# Patient Record
Sex: FEMALE | Race: OTHER | HISPANIC OR LATINO | Employment: PART TIME | URBAN - METROPOLITAN AREA
[De-identification: names, ages, dates, MRNs, and addresses within clinical notes are randomized per-mention and may not be internally consistent; named-entity substitution may affect disease eponyms.]

---

## 2017-06-24 ENCOUNTER — HOSPITAL ENCOUNTER (EMERGENCY)
Facility: HOSPITAL | Age: 22
Discharge: HOME | End: 2017-06-24
Attending: EMERGENCY MEDICINE
Payer: COMMERCIAL

## 2017-06-24 VITALS
HEART RATE: 93 BPM | TEMPERATURE: 98.2 F | RESPIRATION RATE: 18 BRPM | SYSTOLIC BLOOD PRESSURE: 126 MMHG | DIASTOLIC BLOOD PRESSURE: 71 MMHG | OXYGEN SATURATION: 98 % | WEIGHT: 160.05 LBS

## 2017-06-24 DIAGNOSIS — H60.91 RIGHT OTITIS EXTERNA: ICD-10-CM

## 2017-06-24 DIAGNOSIS — H66.91 RIGHT OTITIS MEDIA: Primary | ICD-10-CM

## 2017-06-24 PROCEDURE — 99283 EMERGENCY DEPT VISIT LOW MDM: CPT

## 2017-06-24 RX ORDER — IBUPROFEN 600 MG/1
600 TABLET ORAL ONCE
Status: COMPLETED | OUTPATIENT
Start: 2017-06-24 | End: 2017-06-24

## 2017-06-24 RX ORDER — AMOXICILLIN AND CLAVULANATE POTASSIUM 875; 125 MG/1; MG/1
1 TABLET, FILM COATED ORAL ONCE
Status: COMPLETED | OUTPATIENT
Start: 2017-06-24 | End: 2017-06-24

## 2017-06-24 RX ORDER — IBUPROFEN 600 MG/1
600 TABLET ORAL EVERY 6 HOURS PRN
Qty: 30 TABLET | Refills: 0 | Status: SHIPPED | OUTPATIENT
Start: 2017-06-24 | End: 2017-12-03

## 2017-06-24 RX ORDER — AMOXICILLIN AND CLAVULANATE POTASSIUM 875; 125 MG/1; MG/1
1 TABLET, FILM COATED ORAL 2 TIMES DAILY
Qty: 20 TABLET | Refills: 0 | Status: SHIPPED | OUTPATIENT
Start: 2017-06-24 | End: 2017-07-04

## 2017-06-24 RX ORDER — PROPARACAINE HYDROCHLORIDE 5 MG/ML
1 SOLUTION/ DROPS OPHTHALMIC ONCE
Status: COMPLETED | OUTPATIENT
Start: 2017-06-24 | End: 2017-06-24

## 2017-06-24 RX ORDER — NEOMYCIN SULFATE, POLYMYXIN B SULFATE AND HYDROCORTISONE 10; 3.5; 1 MG/ML; MG/ML; [USP'U]/ML
4 SUSPENSION/ DROPS AURICULAR (OTIC) ONCE
Qty: 10 ML | Refills: 0 | Status: SHIPPED | OUTPATIENT
Start: 2017-06-24 | End: 2017-12-03

## 2017-06-24 RX ORDER — NEOMYCIN SULFATE, POLYMYXIN B SULFATE AND HYDROCORTISONE 10; 3.5; 1 MG/ML; MG/ML; [USP'U]/ML
4 SUSPENSION/ DROPS AURICULAR (OTIC) ONCE
Status: COMPLETED | OUTPATIENT
Start: 2017-06-24 | End: 2017-06-24

## 2017-06-24 RX ADMIN — AMOXICILLIN AND CLAVULANATE POTASSIUM 1 TABLET: 875; 125 TABLET, FILM COATED ORAL at 02:05

## 2017-06-24 RX ADMIN — IBUPROFEN 600 MG: 600 TABLET, FILM COATED ORAL at 02:05

## 2017-06-24 RX ADMIN — PROPARACAINE HYDROCHLORIDE 1 DROP: 5 SOLUTION/ DROPS OPHTHALMIC at 02:06

## 2017-06-24 RX ADMIN — NEOMYCIN SULFATE, POLYMYXIN B SULFATE AND HYDROCORTISONE 4 DROP: 10; 3.5; 1 SUSPENSION/ DROPS AURICULAR (OTIC) at 02:06

## 2017-06-25 ENCOUNTER — HOSPITAL ENCOUNTER (EMERGENCY)
Facility: HOSPITAL | Age: 22
Discharge: HOME | End: 2017-06-25
Admitting: EMERGENCY MEDICINE
Payer: COMMERCIAL

## 2017-06-25 ENCOUNTER — APPOINTMENT (EMERGENCY)
Dept: CT IMAGING | Facility: HOSPITAL | Age: 22
End: 2017-06-25
Payer: COMMERCIAL

## 2017-06-25 VITALS
DIASTOLIC BLOOD PRESSURE: 67 MMHG | TEMPERATURE: 99.6 F | WEIGHT: 150 LBS | SYSTOLIC BLOOD PRESSURE: 121 MMHG | OXYGEN SATURATION: 100 % | HEART RATE: 94 BPM | RESPIRATION RATE: 18 BRPM

## 2017-06-25 DIAGNOSIS — H66.90 OTITIS MEDIA: Primary | ICD-10-CM

## 2017-06-25 DIAGNOSIS — H60.90 OTITIS EXTERNA: ICD-10-CM

## 2017-06-25 LAB
ANION GAP SERPL CALCULATED.3IONS-SCNC: 7 MMOL/L (ref 4–13)
BASOPHILS # BLD AUTO: 0.01 THOUSANDS/ΜL (ref 0–0.1)
BASOPHILS NFR BLD AUTO: 0 % (ref 0–1)
BUN SERPL-MCNC: 5 MG/DL (ref 5–25)
CALCIUM SERPL-MCNC: 9.2 MG/DL (ref 8.3–10.1)
CHLORIDE SERPL-SCNC: 100 MMOL/L (ref 100–108)
CO2 SERPL-SCNC: 28 MMOL/L (ref 21–32)
CREAT SERPL-MCNC: 0.62 MG/DL (ref 0.6–1.3)
EOSINOPHIL # BLD AUTO: 0.07 THOUSAND/ΜL (ref 0–0.61)
EOSINOPHIL NFR BLD AUTO: 1 % (ref 0–6)
ERYTHROCYTE [DISTWIDTH] IN BLOOD BY AUTOMATED COUNT: 14.5 % (ref 11.6–15.1)
GFR SERPL CREATININE-BSD FRML MDRD: >60 ML/MIN/1.73SQ M
GLUCOSE SERPL-MCNC: 114 MG/DL (ref 65–140)
HCG UR QL: NORMAL
HCT VFR BLD AUTO: 38.3 % (ref 34.8–46.1)
HGB BLD-MCNC: 13 G/DL (ref 11.5–15.4)
LYMPHOCYTES # BLD AUTO: 1.35 THOUSANDS/ΜL (ref 0.6–4.47)
LYMPHOCYTES NFR BLD AUTO: 14 % (ref 14–44)
MCH RBC QN AUTO: 27.4 PG (ref 26.8–34.3)
MCHC RBC AUTO-ENTMCNC: 33.9 G/DL (ref 31.4–37.4)
MCV RBC AUTO: 81 FL (ref 82–98)
MONOCYTES # BLD AUTO: 0.8 THOUSAND/ΜL (ref 0.17–1.22)
MONOCYTES NFR BLD AUTO: 9 % (ref 4–12)
NEUTROPHILS # BLD AUTO: 7.17 THOUSANDS/ΜL (ref 1.85–7.62)
NEUTS SEG NFR BLD AUTO: 76 % (ref 43–75)
NRBC BLD AUTO-RTO: 0 /100 WBCS
PLATELET # BLD AUTO: 205 THOUSANDS/UL (ref 149–390)
PMV BLD AUTO: 9.5 FL (ref 8.9–12.7)
POTASSIUM SERPL-SCNC: 4.2 MMOL/L (ref 3.5–5.3)
RBC # BLD AUTO: 4.74 MILLION/UL (ref 3.81–5.12)
SODIUM SERPL-SCNC: 135 MMOL/L (ref 136–145)
WBC # BLD AUTO: 9.4 THOUSAND/UL (ref 4.31–10.16)

## 2017-06-25 PROCEDURE — 36415 COLL VENOUS BLD VENIPUNCTURE: CPT | Performed by: PHYSICIAN ASSISTANT

## 2017-06-25 PROCEDURE — 99284 EMERGENCY DEPT VISIT MOD MDM: CPT

## 2017-06-25 PROCEDURE — 80048 BASIC METABOLIC PNL TOTAL CA: CPT | Performed by: PHYSICIAN ASSISTANT

## 2017-06-25 PROCEDURE — 85025 COMPLETE CBC W/AUTO DIFF WBC: CPT | Performed by: PHYSICIAN ASSISTANT

## 2017-06-25 PROCEDURE — 96374 THER/PROPH/DIAG INJ IV PUSH: CPT

## 2017-06-25 PROCEDURE — 70480 CT ORBIT/EAR/FOSSA W/O DYE: CPT

## 2017-06-25 PROCEDURE — 81025 URINE PREGNANCY TEST: CPT | Performed by: PHYSICIAN ASSISTANT

## 2017-06-25 RX ORDER — TRAMADOL HYDROCHLORIDE 50 MG/1
50 TABLET ORAL ONCE
Status: COMPLETED | OUTPATIENT
Start: 2017-06-25 | End: 2017-06-25

## 2017-06-25 RX ORDER — TRAMADOL HYDROCHLORIDE 50 MG/1
50 TABLET ORAL EVERY 6 HOURS PRN
Qty: 12 TABLET | Refills: 0 | Status: SHIPPED | OUTPATIENT
Start: 2017-06-25 | End: 2017-06-25

## 2017-06-25 RX ORDER — METHYLPREDNISOLONE SODIUM SUCCINATE 125 MG/2ML
80 INJECTION, POWDER, LYOPHILIZED, FOR SOLUTION INTRAMUSCULAR; INTRAVENOUS ONCE
Status: COMPLETED | OUTPATIENT
Start: 2017-06-25 | End: 2017-06-25

## 2017-06-25 RX ORDER — MORPHINE SULFATE 15 MG/1
15 TABLET ORAL EVERY 4 HOURS PRN
Qty: 30 TABLET | Refills: 0 | Status: SHIPPED | OUTPATIENT
Start: 2017-06-25 | End: 2017-07-05

## 2017-06-25 RX ADMIN — TRAMADOL HYDROCHLORIDE 50 MG: 50 TABLET, COATED ORAL at 20:45

## 2017-06-25 RX ADMIN — METHYLPREDNISOLONE SODIUM SUCCINATE 80 MG: 125 INJECTION, POWDER, FOR SOLUTION INTRAMUSCULAR; INTRAVENOUS at 21:49

## 2017-10-13 LAB
EXTERNAL ABO GROUPING: NORMAL
EXTERNAL ANTIBODY SCREEN: NORMAL
EXTERNAL HEPATITIS B SURFACE ANTIGEN: NONREACTIVE
EXTERNAL HIV-1 ANTIBODY: NONREACTIVE
EXTERNAL RH FACTOR: POSITIVE
EXTERNAL RUBELLA IGG QUANTITATION: NORMAL
EXTERNAL SYPHILIS RPR SCREEN: NORMAL

## 2017-10-30 ENCOUNTER — APPOINTMENT (OUTPATIENT)
Dept: LAB | Facility: MEDICAL CENTER | Age: 22
End: 2017-10-30

## 2017-10-30 ENCOUNTER — APPOINTMENT (OUTPATIENT)
Dept: URGENT CARE | Facility: MEDICAL CENTER | Age: 22
End: 2017-10-30

## 2017-10-30 ENCOUNTER — TRANSCRIBE ORDERS (OUTPATIENT)
Dept: URGENT CARE | Facility: MEDICAL CENTER | Age: 22
End: 2017-10-30

## 2017-10-30 DIAGNOSIS — Z02.1 PHYSICAL EXAM, PRE-EMPLOYMENT: Primary | ICD-10-CM

## 2017-10-30 DIAGNOSIS — Z02.1 PHYSICAL EXAM, PRE-EMPLOYMENT: ICD-10-CM

## 2017-10-30 PROCEDURE — 86787 VARICELLA-ZOSTER ANTIBODY: CPT

## 2017-10-30 PROCEDURE — 36415 COLL VENOUS BLD VENIPUNCTURE: CPT

## 2017-10-30 PROCEDURE — 86480 TB TEST CELL IMMUN MEASURE: CPT

## 2017-10-31 LAB — VZV IGG SER IA-ACNC: NORMAL

## 2017-11-01 LAB
ANNOTATION COMMENT IMP: NORMAL
GAMMA INTERFERON BACKGROUND BLD IA-ACNC: 0.03 IU/ML
M TB IFN-G BLD-IMP: NEGATIVE
M TB IFN-G CD4+ BCKGRND COR BLD-ACNC: 0.01 IU/ML
M TB IFN-G CD4+ T-CELLS BLD-ACNC: 0.04 IU/ML
MITOGEN IGNF BLD-ACNC: >10 IU/ML
QUANTIFERON-TB GOLD IN TUBE: NORMAL
SERVICE CMNT-IMP: NORMAL

## 2017-11-10 LAB — CFTR MUT ANL BLD/T: NEGATIVE

## 2017-12-03 ENCOUNTER — HOSPITAL ENCOUNTER (EMERGENCY)
Facility: HOSPITAL | Age: 22
Discharge: HOME | End: 2017-12-03
Attending: EMERGENCY MEDICINE | Admitting: EMERGENCY MEDICINE
Payer: COMMERCIAL

## 2017-12-03 VITALS
TEMPERATURE: 98.1 F | RESPIRATION RATE: 16 BRPM | OXYGEN SATURATION: 100 % | SYSTOLIC BLOOD PRESSURE: 114 MMHG | DIASTOLIC BLOOD PRESSURE: 69 MMHG | HEART RATE: 98 BPM

## 2017-12-03 DIAGNOSIS — L03.116 CELLULITIS OF LEFT FOOT: ICD-10-CM

## 2017-12-03 DIAGNOSIS — L03.90 CELLULITIS: Primary | ICD-10-CM

## 2017-12-03 PROCEDURE — 99283 EMERGENCY DEPT VISIT LOW MDM: CPT

## 2017-12-03 RX ORDER — CEPHALEXIN 250 MG/1
500 CAPSULE ORAL ONCE
Status: COMPLETED | OUTPATIENT
Start: 2017-12-03 | End: 2017-12-03

## 2017-12-03 RX ORDER — PANTOPRAZOLE SODIUM 20 MG/1
TABLET, DELAYED RELEASE ORAL DAILY
COMMUNITY
End: 2018-01-01

## 2017-12-03 RX ORDER — CEPHALEXIN 500 MG/1
500 CAPSULE ORAL EVERY 12 HOURS SCHEDULED
Qty: 14 CAPSULE | Refills: 0 | Status: SHIPPED | OUTPATIENT
Start: 2017-12-03 | End: 2017-12-10

## 2017-12-03 RX ADMIN — CEPHALEXIN 500 MG: 250 CAPSULE ORAL at 21:48

## 2017-12-04 NOTE — DISCHARGE INSTRUCTIONS
Cellulitis, Ambulatory Care   GENERAL INFORMATION:   Cellulitis  is a skin infection caused by bacteria  Common symptoms include the following:   · Fever    · A red, warm, swollen area on your skin    · Pain when the area is touched    · Bumps or blisters (abscess) that may drain pus    · Bumpy, raised skin that feels like an orange peel  Seek immediate care for the following symptoms:   · An increase in pain, redness, warmth, and size    · Red streaks coming from the infected area    · A thin, gray-brown discharge coming from your infected skin area    · A crackling under your skin when you touch it    · Purple dots or bumps on your skin, or bleeding under your skin    · New swelling and pain in your legs    · Sudden trouble breathing or chest pain  Treatment for cellulitis  may include medicines to treat the bacterial infection or decrease pain  The infection may need to be cleaned out  Damaged, dead, or infected tissue may need to be cut away to help your wound heal   Manage your symptoms:   · Elevate your wound above the level of your heart  as often as you can  This will help decrease swelling and pain  Prop your wound on pillows or blankets to keep it elevated comfortably  · Clean your wound as directed  You may need to wash the wound with soap and water  Look for signs of infection  · Wear pressure stockings as directed  The stockings are tight and put pressure on your legs  This improves blood flow and decreases swelling  Prevent cellulitis:   · Wash your hands often  Use soap and water  Wash your hands after you use the bathroom, change a child's diapers, or sneeze  Wash your hands before you prepare or eat food  Use lotion to prevent dry, cracked skin  · Do not share personal items, such as towels, clothing, and razors  · Clean exercise equipment  with germ-killing  before and after you use it    Follow up with your healthcare provider as directed:  Write down your questions so you remember to ask them during your visits  CARE AGREEMENT:   You have the right to help plan your care  Learn about your health condition and how it may be treated  Discuss treatment options with your caregivers to decide what care you want to receive  You always have the right to refuse treatment  The above information is an  only  It is not intended as medical advice for individual conditions or treatments  Talk to your doctor, nurse or pharmacist before following any medical regimen to see if it is safe and effective for you  © 2014 [address] is for End User's use only and may not be sold, redistributed or otherwise used for commercial purposes  All illustrations and images included in CareNotes® are the copyrighted property of A D A M , Inc  or [facility]  Cellulitis   WHAT YOU NEED TO KNOW:   Cellulitis is a skin infection caused by bacteria  Cellulitis may go away on its own or you may need treatment  Your healthcare provider may draw a [place] around the outside edges of your cellulitis  If your cellulitis spreads, your healthcare provider will see it outside of the [place]  DISCHARGE INSTRUCTIONS:   Call 911 if:   · You have sudden trouble breathing or chest pain  Seek care immediately if:   · Your wound gets larger and more painful  · You feel a crackling under your skin when you touch it  · You have purple dots or bumps on your skin, or you see bleeding under your skin  · You have new swelling and pain in your legs  · The red, warm, swollen area gets larger  · You see red streaks coming from the infected area  Contact your healthcare provider if:   · You have a fever  · Your fever or pain does not go away or gets worse  · The area does not get smaller after 2 days of antibiotics  · Your skin is flaking or peeling off  · You have questions or concerns about your condition or care    Medicines: · Antibiotics  help treat the bacterial infection  · NSAIDs , such as ibuprofen, help decrease swelling, pain, and fever  NSAIDs can cause stomach bleeding or kidney problems in certain people  If you take blood thinner medicine, always ask if NSAIDs are safe for you  Always read the medicine label and follow directions  Do not give these medicines to children under 10months of age without direction from your child's healthcare provider  · Acetaminophen  decreases pain and fever  It is available without a doctor's order  Ask how much to take and how often to take it  Follow directions  Read the labels of all other medicines you are using to see if they also contain acetaminophen, or ask your doctor or pharmacist  Acetaminophen can cause liver damage if not taken correctly  Do not use more than 4 grams (4,000 milligrams) total of acetaminophen in one day  · Take your medicine as directed  Contact your healthcare provider if you think your medicine is not helping or if you have side effects  Tell him or her if you are allergic to any medicine  Keep a list of the medicines, vitamins, and herbs you take  Include the amounts, and when and why you take them  Bring the list or the pill bottles to follow-up visits  Carry your medicine list with you in case of an emergency  Self-care:   · Elevate the area above the level of your heart  as often as you can  This will help decrease swelling and pain  Prop the area on pillows or blankets to keep it elevated comfortably  · Clean the area daily until the wound scabs over  Gently wash the area with soap and water  Pat dry  Use dressings as directed  · Place cool or warm, wet cloths on the area as directed  Use clean cloths and clean water  Leave it on the area until the cloth is room temperature  Pat the area dry with a clean, dry cloth  The cloths may help decrease pain  Prevent cellulitis:   · Do not scratch bug bites or areas of injury    You increase your risk for cellulitis by scratching these areas  · Do not share personal items, such as towels, clothing, and razors  · Clean exercise equipment  with germ-killing  before and after you use it  · Wash your hands often  Use soap and water  Wash your hands after you use the bathroom, change a child's diapers, or sneeze  Wash your hands before you prepare or eat food  Use lotion to prevent dry, cracked skin  · Wear pressure stockings as directed  You may be told to wear the stockings if you have peripheral edema  The stockings improve blood flow and decrease swelling  · Treat athlete's foot  This can help prevent the spread of a bacterial skin infection  Follow up with your healthcare provider within 3 days, or as directed: Your healthcare provider will check if your cellulitis is getting better  You may need different medicine  Write down your questions so you remember to ask them during your visits  © 2017 [address]  Information is for End User's use only and may not be sold, redistributed or otherwise used for commercial purposes  All illustrations and images included in CareNotes® are the copyrighted property of A D A M , Inc  or [employer] Mann  The above information is an  only  It is not intended as medical advice for individual conditions or treatments  Talk to your doctor, nurse or pharmacist before following any medical regimen to see if it is safe and effective for you

## 2017-12-05 ENCOUNTER — APPOINTMENT (EMERGENCY)
Dept: RADIOLOGY | Facility: HOSPITAL | Age: 22
End: 2017-12-05
Payer: COMMERCIAL

## 2017-12-05 ENCOUNTER — HOSPITAL ENCOUNTER (EMERGENCY)
Facility: HOSPITAL | Age: 22
Discharge: HOME | End: 2017-12-05
Attending: EMERGENCY MEDICINE | Admitting: EMERGENCY MEDICINE
Payer: COMMERCIAL

## 2017-12-05 VITALS
TEMPERATURE: 98.8 F | BODY MASS INDEX: 29.23 KG/M2 | SYSTOLIC BLOOD PRESSURE: 108 MMHG | HEIGHT: 59 IN | HEART RATE: 78 BPM | RESPIRATION RATE: 18 BRPM | DIASTOLIC BLOOD PRESSURE: 57 MMHG | OXYGEN SATURATION: 100 % | WEIGHT: 145 LBS

## 2017-12-05 DIAGNOSIS — L03.116 CELLULITIS OF LEFT FOOT: Primary | ICD-10-CM

## 2017-12-05 LAB
ANION GAP SERPL CALCULATED.3IONS-SCNC: 8 MMOL/L (ref 4–13)
BASOPHILS # BLD AUTO: 0.01 THOUSANDS/ΜL (ref 0–0.1)
BASOPHILS NFR BLD AUTO: 0 % (ref 0–1)
BUN SERPL-MCNC: 11 MG/DL (ref 5–25)
CALCIUM SERPL-MCNC: 9 MG/DL (ref 8.3–10.1)
CHLORIDE SERPL-SCNC: 104 MMOL/L (ref 100–108)
CO2 SERPL-SCNC: 25 MMOL/L (ref 21–32)
CREAT SERPL-MCNC: 0.43 MG/DL (ref 0.6–1.3)
CRP SERPL QL: 34.7 MG/L
EOSINOPHIL # BLD AUTO: 0.13 THOUSAND/ΜL (ref 0–0.61)
EOSINOPHIL NFR BLD AUTO: 1 % (ref 0–6)
ERYTHROCYTE [DISTWIDTH] IN BLOOD BY AUTOMATED COUNT: 14.1 % (ref 11.6–15.1)
GFR SERPL CREATININE-BSD FRML MDRD: 145 ML/MIN/1.73SQ M
GLUCOSE SERPL-MCNC: 72 MG/DL (ref 65–140)
HCT VFR BLD AUTO: 37.3 % (ref 34.8–46.1)
HGB BLD-MCNC: 12.7 G/DL (ref 11.5–15.4)
LYMPHOCYTES # BLD AUTO: 2.62 THOUSANDS/ΜL (ref 0.6–4.47)
LYMPHOCYTES NFR BLD AUTO: 28 % (ref 14–44)
MCH RBC QN AUTO: 28.2 PG (ref 26.8–34.3)
MCHC RBC AUTO-ENTMCNC: 34 G/DL (ref 31.4–37.4)
MCV RBC AUTO: 83 FL (ref 82–98)
MONOCYTES # BLD AUTO: 0.65 THOUSAND/ΜL (ref 0.17–1.22)
MONOCYTES NFR BLD AUTO: 7 % (ref 4–12)
NEUTROPHILS # BLD AUTO: 6.09 THOUSANDS/ΜL (ref 1.85–7.62)
NEUTS SEG NFR BLD AUTO: 64 % (ref 43–75)
NRBC BLD AUTO-RTO: 0 /100 WBCS
PLATELET # BLD AUTO: 206 THOUSANDS/UL (ref 149–390)
PMV BLD AUTO: 9.4 FL (ref 8.9–12.7)
POTASSIUM SERPL-SCNC: 3.9 MMOL/L (ref 3.5–5.3)
RBC # BLD AUTO: 4.51 MILLION/UL (ref 3.81–5.12)
SODIUM SERPL-SCNC: 137 MMOL/L (ref 136–145)
WBC # BLD AUTO: 9.53 THOUSAND/UL (ref 4.31–10.16)

## 2017-12-05 PROCEDURE — 85025 COMPLETE CBC W/AUTO DIFF WBC: CPT | Performed by: EMERGENCY MEDICINE

## 2017-12-05 PROCEDURE — 80048 BASIC METABOLIC PNL TOTAL CA: CPT | Performed by: EMERGENCY MEDICINE

## 2017-12-05 PROCEDURE — 96365 THER/PROPH/DIAG IV INF INIT: CPT

## 2017-12-05 PROCEDURE — 36415 COLL VENOUS BLD VENIPUNCTURE: CPT | Performed by: EMERGENCY MEDICINE

## 2017-12-05 PROCEDURE — 99283 EMERGENCY DEPT VISIT LOW MDM: CPT

## 2017-12-05 PROCEDURE — 73630 X-RAY EXAM OF FOOT: CPT

## 2017-12-05 PROCEDURE — 86140 C-REACTIVE PROTEIN: CPT | Performed by: EMERGENCY MEDICINE

## 2017-12-05 RX ORDER — CLINDAMYCIN HYDROCHLORIDE 150 MG/1
450 CAPSULE ORAL EVERY 8 HOURS SCHEDULED
Qty: 40 CAPSULE | Refills: 0 | Status: SHIPPED | OUTPATIENT
Start: 2017-12-05 | End: 2017-12-15

## 2017-12-05 RX ORDER — CLINDAMYCIN HYDROCHLORIDE 150 MG/1
450 CAPSULE ORAL EVERY 8 HOURS SCHEDULED
Qty: 40 CAPSULE | Refills: 0 | Status: SHIPPED | OUTPATIENT
Start: 2017-12-05 | End: 2017-12-05 | Stop reason: CLARIF

## 2017-12-05 RX ORDER — CLINDAMYCIN PHOSPHATE 600 MG/50ML
600 INJECTION INTRAVENOUS ONCE
Status: COMPLETED | OUTPATIENT
Start: 2017-12-05 | End: 2017-12-05

## 2017-12-05 RX ADMIN — CLINDAMYCIN PHOSPHATE 600 MG: 12 INJECTION, SOLUTION INTRAMUSCULAR; INTRAVENOUS at 16:54

## 2017-12-05 NOTE — ED ATTENDING ATTESTATION
Antoine Fitch MD, saw and evaluated the patient  I have discussed the patient with the resident/non-physician practitioner and agree with the resident's/non-physician practitioner's findings, Plan of Care, and MDM as documented in the resident's/non-physician practitioner's note, except where noted  All available labs and Radiology studies were reviewed  At this point I agree with the current assessment done in the Emergency Department  I have conducted an independent evaluation of this patient a history and physical is as follows:   Pt is 3 months pregnant and started with pain and swelling of 5th toe on L with a pustule on Thursday Pt was seen on the 3rd and was given keflex BID She has taken 6 doses and feels it is getting worse Pt did pop the pustule in shower and got some pus  Pt had fevers at start but they have been lessening and improving PE: alert heart reg lungs clear abd soft nontender L 5th toe with pustule nad redness no crepitus this has gone to ankle  No lymphangitic streaking MDM: will xray check labs give clindamycin which is category B in preg   Will recheck in 24 hours if labs ok     Critical Care Time  CritCare Time

## 2017-12-05 NOTE — DISCHARGE INSTRUCTIONS
You were seen in the ER for worsening cellulitis of your left foot  Your lab work was normal and the x-ray of your foot did not show signs of gas in the tissue, which would be suggestive of a more severe type of infection  Please take the new antibiotic and stop taking the Keflex  Continue to take tylenol as-needed for pain and put your foot up while at work tomorrow  Please be seen by a doctor in the ER tomorrow afternoon so he or she can evaluate whether your infection is continuing to spread  Please return sooner if you notice that the pain or redness is spreading rapidly  Cellulitis   WHAT YOU NEED TO KNOW:   Cellulitis is a skin infection caused by bacteria  Cellulitis may go away on its own or you may need treatment  Your healthcare provider may draw a [place] around the outside edges of your cellulitis  If your cellulitis spreads, your healthcare provider will see it outside of the [place]  DISCHARGE INSTRUCTIONS:   Call 911 if:   · You have sudden trouble breathing or chest pain  Return to the emergency department if:   · Your wound gets larger and more painful  · You feel a crackling under your skin when you touch it  · You have purple dots or bumps on your skin, or you see bleeding under your skin  · You have new swelling and pain in your legs  · The red, warm, swollen area gets larger  · You see red streaks coming from the infected area  Contact your healthcare provider if:   · You have a fever  · Your fever or pain does not go away or gets worse  · The area does not get smaller after 2 days of antibiotics  · Your skin is flaking or peeling off  · You have questions or concerns about your condition or care  Medicines:   · Antibiotics  help treat the bacterial infection  · NSAIDs , such as ibuprofen, help decrease swelling, pain, and fever  NSAIDs can cause stomach bleeding or kidney problems in certain people   If you take blood thinner medicine, always ask if NSAIDs are safe for you  Always read the medicine label and follow directions  Do not give these medicines to children under 10months of age without direction from your child's healthcare provider  · Acetaminophen  decreases pain and fever  It is available without a doctor's order  Ask how much to take and how often to take it  Follow directions  Read the labels of all other medicines you are using to see if they also contain acetaminophen, or ask your doctor or pharmacist  Acetaminophen can cause liver damage if not taken correctly  Do not use more than 4 grams (4,000 milligrams) total of acetaminophen in one day  · Take your medicine as directed  Contact your healthcare provider if you think your medicine is not helping or if you have side effects  Tell him or her if you are allergic to any medicine  Keep a list of the medicines, vitamins, and herbs you take  Include the amounts, and when and why you take them  Bring the list or the pill bottles to follow-up visits  Carry your medicine list with you in case of an emergency  Self-care:   · Elevate the area above the level of your heart  as often as you can  This will help decrease swelling and pain  Prop the area on pillows or blankets to keep it elevated comfortably  · Clean the area daily until the wound scabs over  Gently wash the area with soap and water  Pat dry  Use dressings as directed  · Place cool or warm, wet cloths on the area as directed  Use clean cloths and clean water  Leave it on the area until the cloth is room temperature  Pat the area dry with a clean, dry cloth  The cloths may help decrease pain  Prevent cellulitis:   · Do not scratch bug bites or areas of injury  You increase your risk for cellulitis by scratching these areas  · Do not share personal items, such as towels, clothing, and razors  · Clean exercise equipment  with germ-killing  before and after you use it  · Wash your hands often    Use soap and water  Wash your hands after you use the bathroom, change a child's diapers, or sneeze  Wash your hands before you prepare or eat food  Use lotion to prevent dry, cracked skin  · Wear pressure stockings as directed  You may be told to wear the stockings if you have peripheral edema  The stockings improve blood flow and decrease swelling  · Treat athlete's foot  This can help prevent the spread of a bacterial skin infection  Follow up with your healthcare provider within 3 days, or as directed: Your healthcare provider will check if your cellulitis is getting better  You may need different medicine  Write down your questions so you remember to ask them during your visits  © 2017 [address] [facility] Information is for End User's use only and may not be sold, redistributed or otherwise used for commercial purposes  All illustrations and images included in CareNotes® are the copyrighted property of [employer]  or [employer] Mann  The above information is an  only  It is not intended as medical advice for individual conditions or treatments  Talk to your doctor, nurse or pharmacist before following any medical regimen to see if it is safe and effective for you

## 2017-12-05 NOTE — ED PROVIDER NOTES
History  Chief Complaint   Patient presents with    Spider Bite     pt states she got bit on thursday by a Spider  Went to [address] [place] [address] on  where they started her on PO antibiotics and marked the wound on her foot, but the redness is spreading  Pt c/o increasing pain in left leg  Patient is a 25year-old female, 3 months pregnant, with a history of von Willebrand disease who presents with a soft tissue infection of the left foot  The pain and redness started at the base of her left pinky toe on Thursday; she attributes this to having been bitten by a spider while cleaning  The pain and redness continued to spread and she developed subjective fever, chills, headache, nausea and vomiting on Saturday and   She was seen at [address] [place] [address] on 12/3 for the same complaint and was treated with PO keflex  She has been compliant with this but her pain, redness, and swelling have continued to spread  She now has difficulty walking 2/2 pain  She had nausea with emesis x1 today but has not had subjective fever since yesterday  Prior to Admission Medications   Prescriptions Last Dose Informant Patient Reported? Taking? Prenatal Multivit-Min-Fe-FA (PRE- PO) 2017 at Unknown time  Yes Yes   Sig: Take by mouth   cephalexin (KEFLEX) 500 mg capsule 2017 at Unknown time  No Yes   Sig: Take 1 capsule by mouth every 12 (twelve) hours for 7 days   pantoprazole (PROTONIX) 20 mg tablet 2017 at Unknown time  Yes Yes   Sig: Take by mouth daily      Facility-Administered Medications: None       Past Medical History:   Diagnosis Date    Von Willebrand disease ([facility] [address] )        Past Surgical History:   Procedure Laterality Date    CHOLECYSTECTOMY      TONSILLECTOMY         History reviewed  No pertinent family history  I have reviewed and agree with the history as documented      Social History   Substance Use Topics    Smoking status: Never Smoker    Smokeless tobacco: Never Used    Alcohol use No Review of Systems   Constitutional: Positive for chills and fever  Negative for fatigue  HENT: Negative for congestion and sore throat  Eyes: Negative for visual disturbance  Respiratory: Negative for cough and shortness of breath  Cardiovascular: Negative for chest pain  Gastrointestinal: Negative for abdominal pain, diarrhea, nausea and vomiting  Endocrine: Negative for polyuria  Genitourinary: Negative for difficulty urinating and dysuria  Musculoskeletal: Negative for arthralgias  Skin: Negative for rash  Neurological: Negative for dizziness, light-headedness and headaches  All other systems reviewed and are negative  Physical Exam  ED Triage Vitals [12/05/17 1525]   Temperature Pulse Respirations Blood Pressure SpO2   98 8 °F (37 1 °C) 86 16 126/63 99 %      Temp Source Heart Rate Source Patient Position - Orthostatic VS BP Location FiO2 (%)   Tympanic Monitor Sitting Left arm --      Pain Score       8           Orthostatic Vital Signs  Vitals:    12/05/17 1525 12/05/17 1634 12/05/17 1800   BP: 126/63 118/66 108/57   Pulse: 86 82 78   Patient Position - Orthostatic VS: Sitting  Sitting       Physical Exam   Constitutional: She is oriented to person, place, and time  She appears well-developed and well-nourished  No distress  HENT:   Head: Normocephalic and atraumatic  Cardiovascular: Normal rate, regular rhythm and normal heart sounds  Pulmonary/Chest: Effort normal and breath sounds normal  No respiratory distress  Abdominal: Soft  Bowel sounds are normal  There is no tenderness  Musculoskeletal: Normal range of motion  Feet:    Neurological: She is alert and oriented to person, place, and time  Skin: Skin is warm and dry  Psychiatric: She has a normal mood and affect  Nursing note and vitals reviewed        ED Medications  Medications   clindamycin (CLEOCIN) IVPB (premix) 600 mg (0 mg Intravenous Stopped 12/5/17 1730)       Diagnostic Studies  Results Reviewed     Procedure Component Value Units Date/Time    Basic metabolic panel [[id number]]  (Abnormal) Collected:  12/05/17 1638    Lab Status:  Final result Specimen:  Blood from Arm, Right Updated:  12/05/17 1729     Sodium 137 mmol/L      Potassium 3 9 mmol/L      Chloride 104 mmol/L      CO2 25 mmol/L      Anion Gap 8 mmol/L      BUN 11 mg/dL      Creatinine 0 43 (L) mg/dL      Glucose 72 mg/dL      Calcium 9 0 mg/dL      eGFR 145 ml/min/1 73sq m     Narrative:         National Kidney Disease Education Program recommendations are as follows:  GFR calculation is accurate only with a steady state creatinine  Chronic Kidney disease less than 60 ml/min/1 73 sq  meters  Kidney failure less than 15 ml/min/1 73 sq  meters  C-reactive protein [[id number]]  (Abnormal) Collected:  12/05/17 1638    Lab Status:  Final result Specimen:  Blood from Arm, Right Updated:  12/05/17 1729     CRP 34 7 (H) mg/L     CBC and differential [[id number]]  (Normal) Collected:  12/05/17 1638    Lab Status:  Final result Specimen:  Blood from Arm, Right Updated:  12/05/17 1653     WBC 9 53 Thousand/uL      RBC 4 51 Million/uL      Hemoglobin 12 7 g/dL      Hematocrit 37 3 %      MCV 83 fL      MCH 28 2 pg      MCHC 34 0 g/dL      RDW 14 1 %      MPV 9 4 fL      Platelets 204 Thousands/uL      nRBC 0 /100 WBCs      Neutrophils Relative 64 %      Lymphocytes Relative 28 %      Monocytes Relative 7 %      Eosinophils Relative 1 %      Basophils Relative 0 %      Neutrophils Absolute 6 09 Thousands/µL      Lymphocytes Absolute 2 62 Thousands/µL      Monocytes Absolute 0 65 Thousand/µL      Eosinophils Absolute 0 13 Thousand/µL      Basophils Absolute 0 01 Thousands/µL                  XR foot 3+ views LEFT   ED Interpretation by Jose Bravo MD (12/05 1657)   No subcutaneous air or osseous abnormality as interpreted independently by me         Final Result by Long Beyer MD (12/06 0633)      Soft tissue swelling along the dorsal aspect of the midfoot without evidence of gas or collection  No radiographic evidence of osteomyelitis  Workstation performed: [id number]               Procedures  Procedures      Phone Consults  ED Phone Contact    ED Course  ED Course                                MDM  Number of Diagnoses or Management Options  Cellulitis of left foot:   Diagnosis management comments: Patient is a 25year-old female, 3 months pregnant, with a history of von Willebrand disease who presents with a soft tissue infection of the left foot  Afebrile with normal vitals  Moderate spread of erythema, warmth, and tenderness beyond previously drawn line of demarcation despite 2 days Keflex  XR does not show air in the soft tissue of the foot  Nl WBC count  Therefore low suspicion for necrotizing fasciitis  Switch to clindamycin PO with first dose given IV in the ED  Discharge with instructions to present in 24 hours for wound recheck  CritCare Time    Disposition  Final diagnoses:   Cellulitis of left foot     Time reflects when diagnosis was documented in both MDM as applicable and the Disposition within this note     Time User Action Codes Description Comment    12/5/2017  5:07 PM Jim Culver Che Add [L03 [id number]] Cellulitis of left foot       ED Disposition     ED Disposition Condition Comment    Discharge  [facility] discharge to home/self care      Condition at discharge: Stable        Follow-up Information     Follow up With Specialties Details Why [address] [facility] Emergency Department Emergency Medicine In 1 day  [address] [place] [address], [address] [place] [address], [id number]        Discharge Medication List as of 12/5/2017  5:48 PM      START taking these medications    Details   clindamycin (CLEOCIN) 150 mg capsule Take 3 capsules by mouth every 8 (eight) hours for 10 days, Starting Tue 12/5/2017, Until Fri 12/15/2017, Print         CONTINUE these medications which have NOT CHANGED    Details   cephalexin (KEFLEX) 500 mg capsule Take 1 capsule by mouth every 12 (twelve) hours for 7 days, Starting Sun 12/3/2017, Until Sun 12/10/2017, Print      pantoprazole (PROTONIX) 20 mg tablet Take by mouth daily, Historical Med      Prenatal Multivit-Min-Fe-FA (PRE- PO) Take by mouth, Historical Med           No discharge procedures on file  ED Provider  Attending physically available and evaluated [facility]  I managed the patient along with the ED Attending      Electronically Signed by         Lala Beverly MD  Resident  [phone]

## 2017-12-06 NOTE — ED ATTENDING ATTESTATION
Merrianne Koyanagi, MD, saw and evaluated the patient  I have discussed the patient with the resident/non-physician practitioner and agree with the resident's/non-physician practitioner's findings, Plan of Care, and MDM as documented in the resident's/non-physician practitioner's note, except where noted  All available labs and Radiology studies were reviewed  At this point I agree with the current assessment done in the Emergency Department  I have conducted an independent evaluation of this patient a history and physical is as follows:  Patient is 3 month pregnant per hx, comes with erythema and swelling of lateral aspect of left distal foot, not sure but possible spider or insect bit at base of 5th digit dorsally, denies fever, chills, abd pain, vaginal bleeding  On exam, alert, non-toxic appearance, VSS, Afebrile; Erythema noted at dorsolateral aspect of left foot from base of 5th digit to 5th MT and adjacent area, no streaking,  no fluctuance  We will treat for cellulitis, give Keflex, return for worsening redness, fevers, chills or any other concern      Critical Care Time  CritCare Time

## 2017-12-27 LAB — EXTERNAL AFP: NORMAL

## 2017-12-29 ENCOUNTER — GENERIC CONVERSION - ENCOUNTER (OUTPATIENT)
Dept: OTHER | Facility: OTHER | Age: 22
End: 2017-12-29

## 2018-01-01 ENCOUNTER — HOSPITAL ENCOUNTER (EMERGENCY)
Facility: HOSPITAL | Age: 23
Discharge: HOME | End: 2018-01-01
Attending: EMERGENCY MEDICINE | Admitting: EMERGENCY MEDICINE
Payer: COMMERCIAL

## 2018-01-01 VITALS
OXYGEN SATURATION: 100 % | RESPIRATION RATE: 20 BRPM | DIASTOLIC BLOOD PRESSURE: 69 MMHG | SYSTOLIC BLOOD PRESSURE: 128 MMHG | HEART RATE: 82 BPM | TEMPERATURE: 97.1 F | WEIGHT: 153 LBS | BODY MASS INDEX: 30.9 KG/M2

## 2018-01-01 DIAGNOSIS — L03.90 CELLULITIS: Primary | ICD-10-CM

## 2018-01-01 PROCEDURE — 99281 EMR DPT VST MAYX REQ PHY/QHP: CPT

## 2018-01-01 RX ORDER — CLINDAMYCIN HYDROCHLORIDE 300 MG/1
300 CAPSULE ORAL 4 TIMES DAILY
Qty: 40 CAPSULE | Refills: 0 | Status: SHIPPED | OUTPATIENT
Start: 2018-01-01 | End: 2018-01-11

## 2018-01-01 RX ORDER — CEPHALEXIN 250 MG/1
500 CAPSULE ORAL ONCE
Status: COMPLETED | OUTPATIENT
Start: 2018-01-01 | End: 2018-01-01

## 2018-01-01 RX ORDER — ACETAMINOPHEN 325 MG/1
650 TABLET ORAL ONCE
Status: COMPLETED | OUTPATIENT
Start: 2018-01-01 | End: 2018-01-01

## 2018-01-01 RX ADMIN — ACETAMINOPHEN 650 MG: 325 TABLET, FILM COATED ORAL at 02:57

## 2018-01-01 RX ADMIN — CEPHALEXIN 500 MG: 250 CAPSULE ORAL at 02:57

## 2018-01-01 NOTE — ED ATTENDING ATTESTATION
I, [address], DO, saw and evaluated the patient  I have discussed the patient with the resident/non-physician practitioner and agree with the resident's/non-physician practitioner's findings, Plan of Care, and MDM as documented in the resident's/non-physician practitioner's note, except where noted  All available labs and Radiology studies were reviewed  At this point I agree with the current assessment done in the Emergency Department  I have conducted an independent evaluation of this patient a history and physical is as follows:    80-year-old female presents with bug bite on right flank  Patient is 17 weeks pregnant  Had similar reaction on her foot recently was treated with antibiotics  No fevers or chills, no other complaints  On exam-no acute distress, heart regular, no respiratory distress area of erythema right flank with central punctate area consistent with insect bite    Plan-antibiotics, recheck 2 days    Critical Care Time  CritCare Time    Procedures

## 2018-01-01 NOTE — ED PROVIDER NOTES
History  Chief Complaint   Patient presents with   [address]     pt c/o bug bite on right flank  pt states it feels like last time she had a spider bite  pt is 17wks pregnant     HPI    51-year-old  presents with chief complaint of rash to her right flank  Patient states that she thinks she had a bug bite 3 days ago and had spreading erythema  She admits to a burning pain worse with palpation, 5/10 7/10 with palpation  This pain does not radiate  Patient had a similar episode on her foot was diagnosed with cellulitis and was started on Keflex at that time  She was pregnant at that time  Patient was on Keflex which did not help and was changed to clindamycin at that time  Patient is denies vaginal bleeding vaginal discharge no abdominal cramping  Patient has been following up with [address] [place] [address] for prenatal care  Patient denies fever chills rigors headache lightheadedness dizziness chest pain palpitations shortness of breath cough pleurisy abdominal pain nausea vomiting diarrhea constipation urinary symptoms numbness and tingling motor weakness    Prior to Admission Medications   Prescriptions Last Dose Informant Patient Reported? Taking? Prenatal Multivit-Min-Fe-FA (PRE-TANYA PO)   Yes No   Sig: Take by mouth      Facility-Administered Medications: None       Past Medical History:   Diagnosis Date    Von Willebrand disease ([facility] [address] )        Past Surgical History:   Procedure Laterality Date    CHOLECYSTECTOMY      TONSILLECTOMY         No family history on file  I have reviewed and agree with the history as documented  Social History   Substance Use Topics    Smoking status: Never Smoker    Smokeless tobacco: Never Used    Alcohol use No        Review of Systems   Constitutional: Negative for activity change, appetite change, chills, diaphoresis, fatigue, fever and unexpected weight change     HENT: Negative for congestion, ear discharge, ear pain, facial swelling, hearing loss, nosebleeds, postnasal drip, rhinorrhea, sinus pressure, sneezing, sore throat and tinnitus  Eyes: Negative for photophobia, pain, redness, itching and visual disturbance  Respiratory: Negative for cough, chest tightness, shortness of breath, wheezing and stridor  Cardiovascular: Negative for chest pain, palpitations and leg swelling  Gastrointestinal: Negative for abdominal distention, abdominal pain, anal bleeding, blood in stool, constipation, diarrhea, nausea and vomiting  Endocrine: Negative for polydipsia, polyphagia and polyuria  Genitourinary: Negative for decreased urine volume, difficulty urinating, dysuria, enuresis, flank pain, frequency, hematuria, menstrual problem, urgency, vaginal bleeding, vaginal discharge and vaginal pain  Musculoskeletal: Negative for arthralgias, back pain, gait problem, joint swelling, myalgias, neck pain and neck stiffness  Skin: Positive for rash  Negative for wound  Allergic/Immunologic: Negative for environmental allergies, food allergies and immunocompromised state  Neurological: Negative for dizziness, tremors, seizures, syncope, facial asymmetry, speech difficulty, weakness, light-headedness, numbness and headaches  Hematological: Negative for adenopathy  Psychiatric/Behavioral: Negative for agitation, behavioral problems, confusion, dysphoric mood, hallucinations, self-injury, sleep disturbance and suicidal ideas  The patient is not nervous/anxious and is not hyperactive          Physical Exam  ED Triage Vitals [01/01/18 0143]   Temperature Pulse Respirations Blood Pressure SpO2   (!) 97 1 °F (36 2 °C) 82 20 128/69 100 %      Temp Source Heart Rate Source Patient Position - Orthostatic VS BP Location FiO2 (%)   Tympanic Monitor Sitting Left arm --      Pain Score       7           Orthostatic Vital Signs  Vitals:    01/01/18 0143   BP: 128/69   Pulse: 82   Patient Position - Orthostatic VS: Sitting       Physical Exam   Constitutional: She is oriented to person, place, and time  She appears well-developed and well-nourished  No distress  HENT:   Head: Normocephalic and atraumatic  Right Ear: External ear normal    Left Ear: External ear normal    Nose: Nose normal    Mouth/Throat: Oropharynx is clear and moist  No oropharyngeal exudate  Eyes: Conjunctivae and EOM are normal  Pupils are equal, round, and reactive to light  Right eye exhibits no discharge  Left eye exhibits no discharge  No scleral icterus  Neck: Normal range of motion  Neck supple  No JVD present  No tracheal deviation present  No thyromegaly present  Cardiovascular: Normal rate, regular rhythm, normal heart sounds and intact distal pulses  No murmur heard  Pulmonary/Chest: Effort normal and breath sounds normal  No stridor  No respiratory distress  She has no wheezes  Abdominal: Soft  Bowel sounds are normal  She exhibits no distension and no mass  There is no tenderness  There is no rebound and no guarding  No hernia  Uterine fundus palpable 3 cm below the umbilicus,  Uterus is not tender   Musculoskeletal: Normal range of motion  She exhibits no edema, tenderness or deformity  Lymphadenopathy:     She has no cervical adenopathy  Neurological: She is alert and oriented to person, place, and time  She displays normal reflexes  No cranial nerve deficit  She exhibits normal muscle tone  Skin: Skin is warm and dry  No rash noted  She is not diaphoretic  No erythema  No crepitation at erythematous site, no streaking erythema no lymphangitis  Pain is not out of proportion  Rash blanches to pressure, macular in nature   Psychiatric: She has a normal mood and affect  Her behavior is normal  Judgment and thought content normal    Nursing note and vitals reviewed        ED Medications  Medications   cephalexin (KEFLEX) capsule 500 mg (500 mg Oral Given 1/1/18 0257)   acetaminophen (TYLENOL) tablet 650 mg (650 mg Oral Given 1/1/18 0257)       Diagnostic Studies  Results Reviewed     None                 No orders to display         Procedures  Procedures      Phone Consults  ED Phone Contact    ED Course  ED Course                                MDM  Number of Diagnoses or Management Options  Cellulitis:   Diagnosis management comments:  55-year-old  1 para 0 at 17 weeks pregnant presenting with chief complaint of potential cellulitis  Patient has a history of this in pregnancy  On exam patient has warmth erythematous rash without lymphangitis, rash does josette bedside ultrasound shows cobblestoning without abscess  Bedside ultrasound of OB shows fetal movement with a heart rate at 130 beats per minute  Keflex given to patient, patient states Keflex does not work  Patient received Keflex in the ED, clindamycin prescription given for patient for cellulitis  Patient educated on the side effects of clindamycin  Patient will follow up with OB  Patient will follow up with  In the ED as she works here in next 2-3 days for wound check  PCP information given as well    CritCare Time    Disposition  Final diagnoses:   Cellulitis     Time reflects when diagnosis was documented in both MDM as applicable and the Disposition within this note     Time User Action Codes Description Comment    2018  3:19 AM Frida Spears Add [L03 [id number]] Cellulitis       ED Disposition     ED Disposition Condition Comment    Discharge  [facility] discharge to home/self care  Condition at discharge: Good    Return precautions were discussed with patient  Patient understands when to return to  Emergency department  Patient agrees to discharge plan and follow up care             Follow-up Information     Follow up With Specialties Details Why [address]  Call in 2 days for [facility] follow up Giuseppe Hogan  [phone]      [facility] to wound check Steve Christopher  [phone] Discharge Medication List as of 2018  3:20 AM      START taking these medications    Details   clindamycin (CLEOCIN) 300 MG capsule Take 1 capsule by mouth 4 (four) times a day for 10 days, Starting Mon 2018, Until Thu 2018, Print         CONTINUE these medications which have NOT CHANGED    Details   Prenatal Multivit-Min-Fe-FA (PRE-TANYA PO) Take by mouth, Historical Med           No discharge procedures on file  ED Provider  Attending physically available and evaluated [facility]  I managed the patient along with the ED Attending      Electronically Signed by         Esperanza Flores DO  Resident  [phone]

## 2018-01-01 NOTE — DISCHARGE INSTRUCTIONS
Cellulitis, Ambulatory Care   GENERAL INFORMATION:   Cellulitis  is a skin infection caused by bacteria  Common symptoms include the following:   · Fever    · A red, warm, swollen area on your skin    · Pain when the area is touched    · Bumps or blisters (abscess) that may drain pus    · Bumpy, raised skin that feels like an orange peel  Seek immediate care for the following symptoms:   · An increase in pain, redness, warmth, and size    · Red streaks coming from the infected area    · A thin, gray-brown discharge coming from your infected skin area    · A crackling under your skin when you touch it    · Purple dots or bumps on your skin, or bleeding under your skin    · New swelling and pain in your legs    · Sudden trouble breathing or chest pain  Treatment for cellulitis  may include medicines to treat the bacterial infection or decrease pain  The infection may need to be cleaned out  Damaged, dead, or infected tissue may need to be cut away to help your wound heal   Manage your symptoms:   · Elevate your wound above the level of your heart  as often as you can  This will help decrease swelling and pain  Prop your wound on pillows or blankets to keep it elevated comfortably  · Clean your wound as directed  You may need to wash the wound with soap and water  Look for signs of infection  · Wear pressure stockings as directed  The stockings are tight and put pressure on your legs  This improves blood flow and decreases swelling  Prevent cellulitis:   · Wash your hands often  Use soap and water  Wash your hands after you use the bathroom, change a child's diapers, or sneeze  Wash your hands before you prepare or eat food  Use lotion to prevent dry, cracked skin  · Do not share personal items, such as towels, clothing, and razors  · Clean exercise equipment  with germ-killing  before and after you use it    Follow up with your healthcare provider as directed:  Write down your questions so you remember to ask them during your visits  CARE AGREEMENT:   You have the right to help plan your care  Learn about your health condition and how it may be treated  Discuss treatment options with your caregivers to decide what care you want to receive  You always have the right to refuse treatment  The above information is an  only  It is not intended as medical advice for individual conditions or treatments  Talk to your doctor, nurse or pharmacist before following any medical regimen to see if it is safe and effective for you  © 2014 [address] is for End User's use only and may not be sold, redistributed or otherwise used for commercial purposes  All illustrations and images included in CareNotes® are the copyrighted property of [employer]  or [employer] Mann

## 2018-01-11 ENCOUNTER — GENERIC CONVERSION - ENCOUNTER (OUTPATIENT)
Dept: OTHER | Facility: OTHER | Age: 23
End: 2018-01-11

## 2018-01-23 NOTE — MISCELLANEOUS
Signatures   Electronically signed by : LURDES Ceja ; Jan 2 2018  9:47AM EST                       (Author)

## 2018-01-24 ENCOUNTER — HOSPITAL ENCOUNTER (OUTPATIENT)
Facility: HOSPITAL | Age: 23
Discharge: HOME | End: 2018-01-25
Attending: OBSTETRICS & GYNECOLOGY | Admitting: OBSTETRICS & GYNECOLOGY
Payer: COMMERCIAL

## 2018-01-24 VITALS
OXYGEN SATURATION: 100 % | RESPIRATION RATE: 18 BRPM | TEMPERATURE: 98.3 F | DIASTOLIC BLOOD PRESSURE: 70 MMHG | HEART RATE: 92 BPM | SYSTOLIC BLOOD PRESSURE: 114 MMHG

## 2018-01-25 PROCEDURE — 99203 OFFICE O/P NEW LOW 30 MIN: CPT

## 2018-01-25 PROCEDURE — G0463 HOSPITAL OUTPT CLINIC VISIT: HCPCS

## 2018-01-25 NOTE — PROGRESS NOTES
L&D Triage Note - OB/GYN  Kajal Hatfield 25 y o  female MRN: [id number]  Unit/Bed#: LD Triage  Encounter: [id number]      SUBJECTIVE:  Kajal Hatfield 25 y o   at [facility] reporting decreased fetal movement and occasional sharp pelvic pain with standing for prolonged periods of time/with movement  Does not report any pelvic pain while in triage  Denies any waves of abdominal tightening/cramping in increasing frequency or intensity  Denies dysuria, fever, chills, nausea, vomiting, shortness of breath, chest pain, changes in urinary/bowel function  Contractions: None  Leakage of fluid: None  Vaginal Bleeding: None  Fetal movement: present  OBJECTIVE:  Blood pressure 114/70, pulse 92, temperature 98 3 °F (36 8 °C), temperature source Oral, resp  rate 18, last menstrual period 2017, SpO2 100 %  ABD: soft, non-tender, non-distended, reproducible pain by rocking uterus    TVUS: CL= 3 75cm, stable - no debris/funnel/dynamic changes  No previa noted, VTX with vigorous movement  FHT:  140bpm baseline    ASSESSMENT:  Kajal Hatfield 25 y o   at 20w3d with round ligament  PLAN:  1  Continue routine prenatal care, recommended tylenol, increased PO hydration, shower, heating pads  2  Discharge from [facility] triage with PTL precautions  3   Case discussed with Dr Chet Ortiz MD  OB/GYN PGY-2  2018 12:05 AM

## 2018-01-26 ENCOUNTER — INITIAL PRENATAL (OUTPATIENT)
Dept: OBGYN CLINIC | Facility: CLINIC | Age: 23
End: 2018-01-26

## 2018-01-26 DIAGNOSIS — Z34.02 ENCOUNTER FOR SUPERVISION OF NORMAL FIRST PREGNANCY IN SECOND TRIMESTER: Primary | ICD-10-CM

## 2018-01-26 PROBLEM — D68.0 VON WILLEBRAND DISEASE: Status: ACTIVE | Noted: 2018-01-26

## 2018-01-26 PROBLEM — D68.00 VON WILLEBRAND DISEASE: Status: ACTIVE | Noted: 2018-01-26

## 2018-01-26 PROCEDURE — OBC: Performed by: PHYSICIAN ASSISTANT

## 2018-01-26 NOTE — PROGRESS NOTES
Patient is a 24 yo  with an [address] of 2018 here for OB intake visit  She is 20w 4d today  Seen with her boyfriend present  She is a transfer from [facility] she has been doing well overall  Was told she had a placenta previa on an early U/S that has now resolved  No other pregnancy complications  Patient has Margart Jasper disease  She does not currently follow with a hematologist, and is not on any medications  Also has seasonal allergies  Gets migraines frequently  PSHx significant for Tonsillectomy in , and cholecystectomy in   No complications from anesthesia  Lists aspirin as an allergy, but reaction is more from Margart Jasper disease  Taking PNV  Family Hx: PGF - diabetes; PGM - diabetes and HTN  Genetic and infectious disease history negative for patient and her partner  Patient had varicella as a child; MRSA hx negative  Recommended weight gain of 25-35 lbs; discussed diet and exercise for pregnancy  Patient works as an ER tech  Stressed the need for excellent hand hygiene  Her coworkers have kept her out of the rooms of possibly infectious patients  Discussed taking breaks when needed and staying hydrated  No travel planned during pregnancy  Patient believes she has had all PN testing thus far; we are still waiting for records  She plans to breastfeed  Went over [employer]  All info given and consent form signed  Patient has appt on  for Level II U/S  Initial OB appt on   Will give referral to hematology at that time  Warned patient she may also need consult with anesthesia secondary to Valeria Hutchinson  Time spent was 60 minutes

## 2018-02-02 ENCOUNTER — ROUTINE PRENATAL (OUTPATIENT)
Dept: OBGYN CLINIC | Facility: CLINIC | Age: 23
End: 2018-02-02

## 2018-02-02 VITALS — WEIGHT: 161.7 LBS | SYSTOLIC BLOOD PRESSURE: 110 MMHG | DIASTOLIC BLOOD PRESSURE: 62 MMHG | BODY MASS INDEX: 32.66 KG/M2

## 2018-02-02 DIAGNOSIS — Z34.02 ENCOUNTER FOR SUPERVISION OF NORMAL FIRST PREGNANCY IN SECOND TRIMESTER: Primary | ICD-10-CM

## 2018-02-02 DIAGNOSIS — D68.0 VON WILLEBRAND DISEASE (HCC): ICD-10-CM

## 2018-02-02 PROCEDURE — PNV: Performed by: PHYSICIAN ASSISTANT

## 2018-02-02 NOTE — PROGRESS NOTES
Assessment     Pregnancy 21 and 4/7 weeks     Plan     Signs and symptoms of  labor: discussed  Follow up in 3 weeks  Referral given for hematology  Level II on   Will discuss possible anesthesia consult at that time  Will try to track down records from  [address]  Stay well hydrated; take breaks at work  Can take OTC PNV; if she desires rx vitamin, needs to see what her insurance will cover  Danielito Ramirez is a 25 y o  female being seen today for her obstetrical visit  She is at 21w4d gestation  Patient reports no bleeding, no leaking, occasional contractions and feelings of incomplete bladder emptying  Fetal movement: normal   Will urinate, then feel like she has to urinate again shortly after  Wants to switch her PNV as her current vitamin is unpalatable  We have not received her records from [place] yet  Menstrual History:  OB History      Para Term  AB Living    1 0 0 0 0 0    SAB TAB Ectopic Multiple Live Births    0 0 0 0 0         Menarche age: N/A  Patient's last menstrual period was 2017  The following portions of the patient's history were reviewed and updated as appropriate: allergies, current medications, past social history and problem list     Review of Systems  Pertinent items are noted in HPI       Objective      /62   Wt 73 3 kg (161 lb 11 2 oz)   LMP 2017   BMI 32 66 kg/m²   FHT: 147 BPM   Uterine Size: size equals dates

## 2018-02-02 NOTE — PATIENT INSTRUCTIONS
Hematology consult for Von Willebrand's disease  Level II next week  Can start OTC PNV  We will continue to try to track down records from previous OB

## 2018-02-08 ENCOUNTER — ROUTINE PRENATAL (OUTPATIENT)
Dept: PERINATAL CARE | Facility: CLINIC | Age: 23
End: 2018-02-08
Payer: COMMERCIAL

## 2018-02-08 VITALS
BODY MASS INDEX: 33.99 KG/M2 | WEIGHT: 168.6 LBS | HEIGHT: 59 IN | SYSTOLIC BLOOD PRESSURE: 97 MMHG | HEART RATE: 78 BPM | DIASTOLIC BLOOD PRESSURE: 55 MMHG

## 2018-02-08 DIAGNOSIS — Z36.86 ENCOUNTER FOR ANTENATAL SCREENING FOR CERVICAL LENGTH: ICD-10-CM

## 2018-02-08 DIAGNOSIS — D68.0 VON WILLEBRAND DISEASE (HCC): Primary | ICD-10-CM

## 2018-02-08 DIAGNOSIS — Z36.3 ENCOUNTER FOR ANTENATAL SCREENING FOR MALFORMATIONS: ICD-10-CM

## 2018-02-08 PROCEDURE — 76817 TRANSVAGINAL US OBSTETRIC: CPT | Performed by: OBSTETRICS & GYNECOLOGY

## 2018-02-08 PROCEDURE — 76811 OB US DETAILED SNGL FETUS: CPT | Performed by: OBSTETRICS & GYNECOLOGY

## 2018-02-08 PROCEDURE — 99212 OFFICE O/P EST SF 10 MIN: CPT | Performed by: OBSTETRICS & GYNECOLOGY

## 2018-02-08 NOTE — PROGRESS NOTES
Maternal-Fetal Medicine: Ms Flaco Perez was seen today in the [address] today for anatomic survey and cervical length screening ultrasound  Please see ultrasound report under "OB Procedures" tab in EPIC    Thank you for the referral and please don't hesitate to contact our office with any concerns or questions      Sincerely,  Willi Burleson MD  Attending Physician, Maternal-Fetal Medicine

## 2018-02-12 ENCOUNTER — HOSPITAL ENCOUNTER (EMERGENCY)
Facility: HOSPITAL | Age: 23
Discharge: HOME | End: 2018-02-12
Attending: EMERGENCY MEDICINE | Admitting: EMERGENCY MEDICINE
Payer: COMMERCIAL

## 2018-02-12 VITALS
RESPIRATION RATE: 16 BRPM | HEART RATE: 89 BPM | WEIGHT: 163 LBS | TEMPERATURE: 98.4 F | DIASTOLIC BLOOD PRESSURE: 62 MMHG | OXYGEN SATURATION: 100 % | BODY MASS INDEX: 32.92 KG/M2 | SYSTOLIC BLOOD PRESSURE: 121 MMHG

## 2018-02-12 DIAGNOSIS — N75.1 BARTHOLIN'S GLAND ABSCESS: Primary | ICD-10-CM

## 2018-02-12 PROCEDURE — 99282 EMERGENCY DEPT VISIT SF MDM: CPT

## 2018-02-12 PROCEDURE — 96374 THER/PROPH/DIAG INJ IV PUSH: CPT

## 2018-02-12 RX ORDER — LIDOCAINE HYDROCHLORIDE 10 MG/ML
5 INJECTION, SOLUTION EPIDURAL; INFILTRATION; INTRACAUDAL; PERINEURAL ONCE
Status: COMPLETED | OUTPATIENT
Start: 2018-02-12 | End: 2018-02-12

## 2018-02-12 RX ADMIN — LIDOCAINE HYDROCHLORIDE 5 ML: 10 INJECTION, SOLUTION EPIDURAL; INFILTRATION; INTRACAUDAL; PERINEURAL at 05:28

## 2018-02-12 RX ADMIN — HYDROMORPHONE HYDROCHLORIDE 0.5 MG: 1 INJECTION, SOLUTION INTRAMUSCULAR; INTRAVENOUS; SUBCUTANEOUS at 05:28

## 2018-02-12 NOTE — DISCHARGE INSTRUCTIONS
Abscess   WHAT YOU NEED TO KNOW:   A warm compress may help your abscess drain  Your healthcare provider may make a cut in the abscess so it can drain  You may need surgery to remove an abscess that is on your hands or buttocks  DISCHARGE INSTRUCTIONS:   Return to the emergency department if:   · The area around your abscess becomes very painful, warm, or has red streaks  · You have a fever and chills  · Your heart is beating faster than usual      · You feel faint or confused  Contact your healthcare provider if:   · Your abscess gets bigger or does not get better  · Your abscess returns  · You have questions or concerns about your condition or care  Medicines: You may  need any of the following:  · Antibiotics  help treat a bacterial infection  · Acetaminophen  decreases pain and fever  It is available without a doctor's order  Ask how much to take and how often to take it  Follow directions  Acetaminophen can cause liver damage if not taken correctly  · NSAIDs , such as ibuprofen, help decrease swelling, pain, and fever  This medicine is available with or without a doctor's order  NSAIDs can cause stomach bleeding or kidney problems in certain people  If you take blood thinner medicine, always ask your healthcare provider if NSAIDs are safe for you  Always read the medicine label and follow directions  · Take your medicine as directed  Contact your healthcare provider if you think your medicine is not helping or if you have side effects  Tell him or her if you are allergic to any medicine  Keep a list of the medicines, vitamins, and herbs you take  Include the amounts, and when and why you take them  Bring the list or the pill bottles to follow-up visits  Carry your medicine list with you in case of an emergency  Self-care:   · Apply a warm compress to your abscess  This will help it open and drain  Wet a washcloth in warm, but not hot, water  Apply the compress for 10 minutes  Repeat this 4 times each day  Do not  press on an abscess or try to open it with a needle  You may push the bacteria deeper or into your blood  · Do not share your clothes, towels, or sheets with anyone  This can spread the infection to others  · Wash your hands often  This can help prevent the spread of germs  Use soap and water or an alcohol-based hand rub  Care for your wound after it is drained:   · Care for your wound as directed  If your healthcare provider says it is okay, carefully remove the bandage and gauze packing  You may need to soak the gauze to get it out of your wound  Clean your wound and the area around it as directed  Dry the area and put on new, clean bandages  Change your bandages when they get wet or dirty  · Ask your healthcare provider how to change the gauze in your wound  Keep track of how many pieces of gauze are placed inside the wound  Do not put too much packing in the wound  Do not pack the gauze too tightly in your wound  Follow up with your healthcare provider in 1 to 3 days: You may need to have your packing removed or your bandage changed  Write down your questions so you remember to ask them during your visits  © 2017 [address]  Information is for End User's use only and may not be sold, redistributed or otherwise used for commercial purposes  All illustrations and images included in CareNotes® are the copyrighted property of [employer]  or Shin Mann  The above information is an  only  It is not intended as medical advice for individual conditions or treatments  Talk to your doctor, nurse or pharmacist before following any medical regimen to see if it is safe and effective for you

## 2018-02-12 NOTE — ED ATTENDING ATTESTATION
Naman Olsen DO, saw and evaluated the patient  I have discussed the patient with the resident/non-physician practitioner and agree with the resident's/non-physician practitioner's findings, Plan of Care, and MDM as documented in the resident's/non-physician practitioner's note, except where noted  All available labs and Radiology studies were reviewed  At this point I agree with the current assessment done in the Emergency Department  I have conducted an independent evaluation of this patient a history and physical is as follows:    Patient is a 49-year-old female that presents for a left-sided porcelain abscess  Patient started to develop symptoms a few days ago  She was seen at urgent care 2 days ago and was started on amoxicillin  Patient states that it was much smaller at the time so the provider did not elect to do an incision and drainage at that time  Patient was instructed to follow up with OBGYN  She has an appointment scheduled for tomorrow but states that the area has been progressively worsening and the pain is now becoming too great where she decided she can't wait and came into the emergency department  Patient denies any other symptoms such as fevers or chills or overlying skin  Changes  Patient is currently 23 weeks pregnant  She denies any vaginal bleeding or abdominal pain or cramping  Vital signs are normal   Patient is in no acute distress  Patient has no lower abdominal tenderness  Patient does have a large left-sided birth on abscess  The surrounding labia is soft and compressible  No overlying skin changes  No necrosis  No crepitus  Plan is to control her pain and perform an incision and drainage and placement of a Word catheter    Critical Care Time  CritCare Time    Procedures

## 2018-02-12 NOTE — ED PROVIDER NOTES
History  Chief Complaint   Patient presents with    Abscess     Patient reports seen at urgent care for bartholin cyst 2 days ago  Increased pain, swelling  Patient is 23 weeks pregnant  Denies contractions, vaginal bleeding, discharge, leaking of fluids  HPI  This is a 26 yo F who presents with left sided bartholin gland abscess  She develops symptoms few days ago and was prescribed amoxicillin  States it got worse and difficulty with ambulating  She is 23 weeks pregnancy  No vaginal bleeding or abdominal pain  No cramping  States she was instructed to followup with OBGYN  She did not have it drained prior bc it was small  She has appointment today but would like to get it drained  Denies any fevers or chills  Impression: bartholin cyst gland  Will I/D and place word catheter  Prior to Admission Medications   Prescriptions Last Dose Informant Patient Reported? Taking? 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[address] )        Past Surgical History:   Procedure Laterality Date    CHOLECYSTECTOMY      2017    GALLBLADDER SURGERY      removal- resolved    TONSILLECTOMY      in        Family History   Problem Relation Age of Onset    Diabetes Paternal Grandmother     Hypertension Paternal Grandmother     Diabetes Paternal Grandfather     No Known Problems Mother     No Known Problems Father     No Known Problems Sister     No Known Problems Brother      I have reviewed and agree with the history as documented      Social History   Substance Use Topics    Smoking status: Never Smoker    Smokeless tobacco: Never Used    Alcohol use No        Review of Systems  REVIEW OF SYSTEMS  Constitutional:  Denies fever or chills   Eyes:  Denies change in visual acuity   HENT:  Denies nasal congestion or sore throat   Respiratory:  Denies cough or shortness of breath   Cardiovascular:  Denies chest pain or edema   GI:  Denies abdominal pain, nausea, vomiting, bloody stools or diarrhea   :  Denies dysuria +left bartholin cyst abscess  Musculoskeletal:  Denies back pain or joint pain   Integument:  Denies rash   Neurologic:  Denies headache, focal weakness or sensory changes   Endocrine:  Denies polyuria or polydipsia   Lymphatic:  Denies swollen glands   Psychiatric:  Denies depression or anxiety     Physical Exam  ED Triage Vitals [02/12/18 0405]   Temperature Pulse Respirations Blood Pressure SpO2   98 4 °F (36 9 °C) 89 16 121/62 100 %      Temp Source Heart Rate Source Patient Position - Orthostatic VS BP Location FiO2 (%)   Temporal Monitor Sitting Right arm --      Pain Score       Worst Possible Pain           Orthostatic Vital Signs  Vitals:    02/12/18 0405   BP: 121/62   Pulse: 89   Patient Position - Orthostatic VS: Sitting       Physical Exam  PHYSICAL EXAM    Constitutional:  Well developed, well nourished, no acute distress, non-toxic appearance    HEENT:  Atraumatic, PERRL, conjunctiva normal  Oropharynx moist, no pharyngeal exudates  Neck- normal range of motion, no tenderness, supple   Respiratory:  No respiratory distress, normal breath sounds, no rales, no wheezing   Cardiovascular:  Normal rate, normal rhythm, no murmurs, no gallops, no rubs   GI:  Soft, nondistended, normal bowel sounds, nontender, no organomegaly, no mass, no rebound, no guarding   :  No costovertebral angle tenderness large left abscess tender to palpation localized to inferior part  Musculoskeletal:  No edema, no tenderness, no deformities   Back- no tenderness  Integument:  Well hydrated, no rash   Lymphatic:  No lymphadenopathy noted   Neurologic:  Alert & oriented x 3, CN 2-12 normal, normal motor function, normal sensory function, no focal deficits noted   Psychiatric:  Speech and behavior appropriate     ED Medications  Medications   lidocaine (PF) (XYLOCAINE-MPF) 1 % injection 5 mL (5 mL Infiltration Given 2/12/18 0528)   HYDROmorphone (DILAUDID) injection 0 5 mg (0 5 mg Intravenous Given 2/12/18 0528)       Diagnostic Studies  Results Reviewed     None                 No orders to display         Procedures  Incision/Drainage  Date/Time: 2/12/2018 6:35 AM  Performed by: Chandrakant Whitley  Authorized by: Brooklynn Hutchinson     Consent:     Consent obtained:  Verbal    Consent given by:  Patient    Risks discussed:  Bleeding and incomplete drainage    Alternatives discussed:  No treatment  Universal protocol:     Patient identity confirmed:  Verbally with patient  Location:     Type:  Bartholin cyst    Size:  6  Pre-procedure details:     Skin preparation:  Betadine  Anesthesia (see MAR for exact dosages): Anesthesia method:  Local infiltration    Local anesthetic:  Lidocaine 1% w/o epi  Procedure details:     Complexity:  Simple    Needle aspiration: no      Incision types:  Stab incision    Scalpel blade:  11    Approach:  Open    Incision depth:  Skin    Wound management:  Probed and deloculated    Irrigation with saline:  20    Drainage:  Purulent and serosanguinous    Drainage amount: Moderate    Wound treatment:  Wound left open    Packing materials: Word catheter  Post-procedure details:     Patient tolerance of procedure: Tolerated well, no immediate complications  Comments:      Drained moderate amount of pus but still some loculations presents             Phone Consults  ED Phone Contact    ED Course  ED Course                                MDM  CritCare Time    Disposition  Final diagnoses:   Bartholin's gland abscess     Time reflects when diagnosis was documented in both MDM as applicable and the Disposition within this note     Time User Action Codes Description Comment    2/12/2018  6:31 AM Mark Jin Add [N75 1] Bartholin's gland abscess       ED Disposition ED Disposition Condition Comment    Discharge  [facility] discharge to home/self care  Condition at discharge: Good        Follow-up Information     Follow up With Specialties Details Why [address] Obstetrics and Gynecology Go today go to your [facility] appointment  continue current medications  if any worsening of symptoms, fevers please return to the ED  [address] [facility]  [place] [address]  [phone]        Patient's Medications   Discharge Prescriptions    No medications on file     No discharge procedures on file  ED Provider  Attending physically available and evaluated [facility]  I managed the patient along with the ED Attending      Electronically Signed by         Leva Bosworth, MD  02/12/18 6326

## 2018-02-14 ENCOUNTER — ROUTINE PRENATAL (OUTPATIENT)
Dept: OBGYN CLINIC | Facility: CLINIC | Age: 23
End: 2018-02-14

## 2018-02-14 VITALS — WEIGHT: 164.2 LBS | SYSTOLIC BLOOD PRESSURE: 110 MMHG | BODY MASS INDEX: 33.16 KG/M2 | DIASTOLIC BLOOD PRESSURE: 64 MMHG

## 2018-02-14 DIAGNOSIS — N75.0 BARTHOLIN GLAND CYST: ICD-10-CM

## 2018-02-14 DIAGNOSIS — Z34.02 ENCOUNTER FOR SUPERVISION OF NORMAL FIRST PREGNANCY IN SECOND TRIMESTER: Primary | ICD-10-CM

## 2018-02-14 PROCEDURE — PNV: Performed by: PHYSICIAN ASSISTANT

## 2018-02-14 NOTE — PROGRESS NOTES
Assessment     Pregnancy 23 and 2/7 weeks     Plan     Counseled on Bartholin cyst  Follow up in 1 weeks  Area still indurated and draining  Recommended tea soak sitz baths for 15-20 minutes 2-3 x daily to increase drainage  Finish abx  May need further I&D at f/u next week if area has not drained completely  Try spray bottle with warm water for decrease in burning sensation when urinating  Aaron Suarez is a 25 y o  female being seen today for her obstetrical visit  She is at 23w2d gestation  Patient reports no bleeding, no contractions, no cramping and no leaking  Fetal movement: normal   Patient here for f/u from ER visit on   Had a Bartholin cyst of the L labia majora drained  States she had a catheter placed to help with drainage that remained in place for 12 hours  Currently on course of amoxicillin from urgent care several days prior to ER visit  Area is  and swollen, but improving  Drainage site is still open; serous drainage, no bleeding  Complains that area burns when urinating  Able to express a small amount of drainage on her own; using warm compresses in the shower  Had Level II at the [facility] on   Needs f/u for missed anatomy  We have received her records from [place]  Still needs f/u with hematology  Menstrual History:  OB History      Para Term  AB Living    1 0 0 0 0 0    SAB TAB Ectopic Multiple Live Births    0 0 0 0 0         Menarche age: N/A     Patient's last menstrual period was 2017  The following portions of the patient's history were reviewed and updated as appropriate: allergies, current medications, past medical history and problem list     Review of Systems  Pertinent items are noted in HPI  Objective      /64   Wt 74 5 kg (164 lb 3 2 oz)   LMP 2017   BMI 33 16 kg/m²   FHT: 141 BPM   Uterine Size: size equals dates      1-2 cm open area at edge of L labia majora    Serous drainage, but no purulent material or bleeding  No erythema or severe swelling

## 2018-02-14 NOTE — LETTER
February 14, 2018     Patient: Leonela Howard   YOB: 1995   Date of Visit: 2/14/2018       To Whom it May Concern:    Leonela Howard is under my professional care  She was seen in my office on 2/14/2018  She may return to work on 02/14/2018  If you have any questions or concerns, please don't hesitate to call           Sincerely,          Philly Huynh PA-C        CC: No Recipients

## 2018-02-14 NOTE — PATIENT INSTRUCTIONS
Sitz bath tea soaks 15-20 minutes 2-3 x daily  Spray bottle with warm water for relief when urinating  Finish antibiotics  Call if symptoms worsen

## 2018-02-20 ENCOUNTER — OB ABSTRACT (OUTPATIENT)
Dept: OBGYN CLINIC | Facility: CLINIC | Age: 23
End: 2018-02-20

## 2018-02-26 NOTE — MISCELLANEOUS
Provider Comments  Provider Comments:   Patient had a 1:30 appointment that she did not show for today and patient was called, voicemail was left to call office back to reschedule ASAP        Signatures   Electronically signed by : Karin Nolasco, ; Jan 11 2018  2:13PM EST                       (Author)

## 2018-03-08 ENCOUNTER — ULTRASOUND (OUTPATIENT)
Dept: PERINATAL CARE | Facility: CLINIC | Age: 23
End: 2018-03-08
Payer: COMMERCIAL

## 2018-03-08 VITALS
HEART RATE: 84 BPM | HEIGHT: 59 IN | BODY MASS INDEX: 34.39 KG/M2 | WEIGHT: 170.6 LBS | SYSTOLIC BLOOD PRESSURE: 107 MMHG | DIASTOLIC BLOOD PRESSURE: 61 MMHG

## 2018-03-08 DIAGNOSIS — Z3A.26 26 WEEKS GESTATION OF PREGNANCY: ICD-10-CM

## 2018-03-08 DIAGNOSIS — Z36.2 ENCOUNTER FOR OTHER ANTENATAL SCREENING FOLLOW-UP: Primary | ICD-10-CM

## 2018-03-08 PROCEDURE — 76816 OB US FOLLOW-UP PER FETUS: CPT | Performed by: OBSTETRICS & GYNECOLOGY

## 2018-03-12 ENCOUNTER — TELEPHONE (OUTPATIENT)
Dept: HEMATOLOGY ONCOLOGY | Facility: CLINIC | Age: 23
End: 2018-03-12

## 2018-03-12 NOTE — TELEPHONE ENCOUNTER
Called and LVM for patient to call us back to R/S the appt the she had today at 11:00 am  Patient was a No call no Show

## 2018-03-19 ENCOUNTER — ROUTINE PRENATAL (OUTPATIENT)
Dept: OBGYN CLINIC | Facility: CLINIC | Age: 23
End: 2018-03-19
Payer: COMMERCIAL

## 2018-03-19 VITALS — BODY MASS INDEX: 34.42 KG/M2 | DIASTOLIC BLOOD PRESSURE: 64 MMHG | WEIGHT: 170.4 LBS | SYSTOLIC BLOOD PRESSURE: 110 MMHG

## 2018-03-19 DIAGNOSIS — N89.8 VAGINAL CYST: ICD-10-CM

## 2018-03-19 DIAGNOSIS — Z3A.28 28 WEEKS GESTATION OF PREGNANCY: Primary | ICD-10-CM

## 2018-03-19 PROCEDURE — 56420 I&D BARTHOLINS GLAND ABSCESS: CPT | Performed by: OBSTETRICS & GYNECOLOGY

## 2018-03-19 PROCEDURE — PNV: Performed by: OBSTETRICS & GYNECOLOGY

## 2018-03-19 RX ORDER — CEPHALEXIN 250 MG/1
250 CAPSULE ORAL 3 TIMES DAILY
Qty: 15 CAPSULE | Refills: 1 | Status: SHIPPED | OUTPATIENT
Start: 2018-03-19 | End: 2018-03-24

## 2018-03-19 NOTE — PATIENT INSTRUCTIONS
No problems today from an obstetric point of view    Bartholin cyst is healing nicely and minimal amount of drainage was obtained after incision today    I will see the patient back in 1 week for careful follow-up    She was told to call the office should any problems or concerns arise during the interim

## 2018-03-19 NOTE — PROGRESS NOTES
Patient was here today for routine obstetric visit and for a Bartholin's cyst follow-up    She had no complaints from an obstetric point of view today    She reported good fetal activity    She denied any vaginal bleeding or loss of fluid    She also denied any headaches or abdominal pain or visual changes    She is eating well and taking adequate fluids on a daily basis    She reported that the cyst is still present on the left labia    Upon examination it was still present but had decreased in size and was slightly firm    I injected a 1 cc of xylocaine local infiltration and with a 15 scalpel blade made a small vertical incision over the cystic area    Minimal amount of drainage resulted and the small oozing from the site was easily controlled with Monsel's solution    This cyst is healing nicely and I told her to continue the warm Sitz baths    I also wrote her a prescription for Keflex 250 mg 15    Tablets 1 tablet to be taken 3 times daily refill x1    She will be seen in 1 week for follow-up    She was told to call the office should any problems or concerns developed during the interim

## 2018-03-27 ENCOUNTER — APPOINTMENT (OUTPATIENT)
Dept: LAB | Facility: HOSPITAL | Age: 23
End: 2018-03-27
Attending: OBSTETRICS & GYNECOLOGY
Payer: COMMERCIAL

## 2018-03-27 LAB
BASOPHILS # BLD AUTO: 0.01 THOUSANDS/ΜL (ref 0–0.1)
BASOPHILS NFR BLD AUTO: 0 % (ref 0–1)
EOSINOPHIL # BLD AUTO: 0.08 THOUSAND/ΜL (ref 0–0.61)
EOSINOPHIL NFR BLD AUTO: 1 % (ref 0–6)
ERYTHROCYTE [DISTWIDTH] IN BLOOD BY AUTOMATED COUNT: 13.5 % (ref 11.6–15.1)
GLUCOSE 1H P 100 G GLC PO SERPL-MCNC: 95 MG/DL (ref 65–179)
HCT VFR BLD AUTO: 37.1 % (ref 34.8–46.1)
HGB BLD-MCNC: 12.2 G/DL (ref 11.5–15.4)
LYMPHOCYTES # BLD AUTO: 2.04 THOUSANDS/ΜL (ref 0.6–4.47)
LYMPHOCYTES NFR BLD AUTO: 21 % (ref 14–44)
MCH RBC QN AUTO: 27.5 PG (ref 26.8–34.3)
MCHC RBC AUTO-ENTMCNC: 32.9 G/DL (ref 31.4–37.4)
MCV RBC AUTO: 84 FL (ref 82–98)
MONOCYTES # BLD AUTO: 0.54 THOUSAND/ΜL (ref 0.17–1.22)
MONOCYTES NFR BLD AUTO: 6 % (ref 4–12)
NEUTROPHILS # BLD AUTO: 6.93 THOUSANDS/ΜL (ref 1.85–7.62)
NEUTS SEG NFR BLD AUTO: 72 % (ref 43–75)
NRBC BLD AUTO-RTO: 0 /100 WBCS
PLATELET # BLD AUTO: 191 THOUSANDS/UL (ref 149–390)
PMV BLD AUTO: 9.5 FL (ref 8.9–12.7)
RBC # BLD AUTO: 4.44 MILLION/UL (ref 3.81–5.12)
WBC # BLD AUTO: 9.67 THOUSAND/UL (ref 4.31–10.16)

## 2018-03-27 PROCEDURE — 36415 COLL VENOUS BLD VENIPUNCTURE: CPT | Performed by: OBSTETRICS & GYNECOLOGY

## 2018-03-27 PROCEDURE — 86592 SYPHILIS TEST NON-TREP QUAL: CPT | Performed by: OBSTETRICS & GYNECOLOGY

## 2018-03-27 PROCEDURE — 85025 COMPLETE CBC W/AUTO DIFF WBC: CPT | Performed by: OBSTETRICS & GYNECOLOGY

## 2018-03-28 ENCOUNTER — TELEPHONE (OUTPATIENT)
Dept: OBGYN CLINIC | Facility: CLINIC | Age: 23
End: 2018-03-28

## 2018-03-28 LAB — RPR SER QL: NORMAL

## 2018-03-28 NOTE — TELEPHONE ENCOUNTER
Spoke with patient regarding 28 week labs  CBC within normal limits  1 hour glucose test 95  Patient missed appointment yesterday due to getting stuck at the lab  Will reschedule appointment for either tomorrow or Friday March 30th

## 2018-03-30 ENCOUNTER — ROUTINE PRENATAL (OUTPATIENT)
Dept: OBGYN CLINIC | Facility: CLINIC | Age: 23
End: 2018-03-30

## 2018-03-30 VITALS — DIASTOLIC BLOOD PRESSURE: 64 MMHG | WEIGHT: 176 LBS | SYSTOLIC BLOOD PRESSURE: 116 MMHG | BODY MASS INDEX: 35.55 KG/M2

## 2018-03-30 DIAGNOSIS — Z34.03 ENCOUNTER FOR SUPERVISION OF NORMAL FIRST PREGNANCY IN THIRD TRIMESTER: Primary | ICD-10-CM

## 2018-03-30 PROCEDURE — PNV: Performed by: PHYSICIAN ASSISTANT

## 2018-03-30 NOTE — PROGRESS NOTES
Assessment     Pregnancy 29 and 4/7 weeks     Plan     28-week labs reviewed, normal  F/u with hematologist and OB anesthesiologist   Follow up in 2 Weeks  Call hematology ASAP for appointment  Will also need visit with OB anesthesiologist   Next growth scan at [facility] in 3 weeks  Tdap next visit if she has not had it already  Can refer to physical therapy if back pain worsens  Kathrine Yen is a 25 y o  female being seen today for her obstetrical visit  She is at 29w4d gestation  Patient reports backache, no bleeding, no leaking and occasional contractions  Fetal movement: normal   Patient doing well  C/o intermittent back pain  Occasional BH contractions  Patient states she is trying to drink more water  Has not had consult with hemtologist for vonWillebrand's disease yet  1 hr GTT 95  Patient thinks she may have had  Tdap when she was hired here, but she is not sure  Bartholin cyst is totally resolved  Menstrual History:  OB History      Para Term  AB Living    1 0 0 0 0 0    SAB TAB Ectopic Multiple Live Births    0 0 0 0 0         Menarche age: N/A  Patient's last menstrual period was 2017  The following portions of the patient's history were reviewed and updated as appropriate: allergies, current medications, past medical history, past social history, past surgical history and problem list     Review of Systems  Pertinent items are noted in HPI       Objective     /64   Wt 79 8 kg (176 lb)   LMP 2017   BMI 35 55 kg/m²   FHT:  147 BPM   Uterine Size: size equals dates   Presentation: unsure

## 2018-04-12 ENCOUNTER — OFFICE VISIT (OUTPATIENT)
Dept: HEMATOLOGY ONCOLOGY | Facility: CLINIC | Age: 23
End: 2018-04-12
Payer: COMMERCIAL

## 2018-04-12 VITALS
TEMPERATURE: 97.8 F | SYSTOLIC BLOOD PRESSURE: 122 MMHG | OXYGEN SATURATION: 98 % | RESPIRATION RATE: 18 BRPM | BODY MASS INDEX: 35.92 KG/M2 | DIASTOLIC BLOOD PRESSURE: 70 MMHG | HEART RATE: 75 BPM | HEIGHT: 59 IN | WEIGHT: 178.2 LBS

## 2018-04-12 DIAGNOSIS — D68.0 VON WILLEBRAND DISEASE (HCC): Primary | ICD-10-CM

## 2018-04-12 PROCEDURE — 99244 OFF/OP CNSLTJ NEW/EST MOD 40: CPT | Performed by: PHYSICIAN ASSISTANT

## 2018-04-12 NOTE — PROGRESS NOTES
Hematology/Oncology Outpatient Consult  Libby Nova 21 y o  female 1995 [id number]    Date:  4/12/2018      Assessment and Plan:  1  Von Willebrand disease ([address] )  -Patient with history of Von Willebrand disease  She recently moved here from [place]  Reviewing records, I was unable to find previous records  - Will evaluate below tests  She will have drawn today  - Advised to schedule with OB anesthesiology within the next one month  Results from the above tests will be available by the end of next week  - Follow up in 3 weeks  - von Willebrand Profile; Future  - Iron Panel; Future  - APTT; Future  - Protime-INR; Future  - Fibrinogen; Future  - CBC and differential  - Comprehensive metabolic panel      HPI:  21year old female presents for consultation for history of Von Willebrand disease  She had nosebleeds when she was a child but not in the past few years  Tonsil surgery at age 13  She had hematoma and bleeding days after that required cauterization  No need for blood transfusions before  Prior to pregnancy, periods were every 4 weeks  Lasted 8-9 days  Changing tampon every 1 hour for entire period  She was recommended to take oral iron in the past  She was inconsistent with this  She stopped taking prior to her pregnancy  ROS: Review of Systems   Constitutional: Negative for appetite change, chills, fever and unexpected weight change  HENT: Negative for congestion, mouth sores, nosebleeds, sinus pain and sore throat  Respiratory: Negative for cough, chest tightness, shortness of breath and wheezing  Cardiovascular: Negative for chest pain, palpitations and leg swelling  Gastrointestinal: Negative for abdominal pain, blood in stool, constipation, diarrhea, nausea and vomiting  Genitourinary: Negative for difficulty urinating, dysuria and hematuria  Musculoskeletal: Negative for arthralgias, joint swelling and myalgias  Skin: Negative      Neurological: Negative for dizziness, weakness, light-headedness, numbness and headaches  Hematological: Negative  Psychiatric/Behavioral: Negative  Past Medical History:   Diagnosis Date    Migraine     Placenta previa antepartum in first trimester     Varicella     As a child    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[address] )        Past Surgical History:   Procedure Laterality Date    CHOLECYSTECTOMY      2017    GALLBLADDER SURGERY      removal- resolved    TONSILLECTOMY      in        Social History     Social History    Marital status: Single     Spouse name: N/A    Number of children: N/A    Years of education: N/A     Social History Main Topics    Smoking status: Never Smoker    Smokeless tobacco: Never Used    Alcohol use No    Drug use: No    Sexual activity: Yes     Other Topics Concern    Not on file     Social History Narrative    No narrative on file       Family History   Problem Relation Age of Onset    Diabetes Paternal Grandmother     Hypertension Paternal Grandmother     Diabetes Paternal Grandfather     No Known Problems Mother     No Known Problems Father     No Known Problems Sister     No Known Problems Brother        Allergies   Allergen Reactions    Aspirin      Von-willebrand disorder          Current Outpatient Prescriptions:     Prenatal Multivit-Min-Fe-FA (PRE-TANYA PO), Take by mouth, Disp: , Rfl:       Physical Exam:  /70 (BP Location: Right arm, Patient Position: Sitting, Cuff Size: Adult)   Pulse 75   Temp 97 8 °F (36 6 °C)   Resp 18   Ht 4' 11" (1 499 m)   Wt 80 8 kg (178 lb 3 2 oz)   LMP 2017   SpO2 98%   BMI 35 99 kg/m²     Physical Exam   Constitutional: She is oriented to person, place, and time  She appears well-developed and well-nourished  No distress  HENT:   Head: Normocephalic and atraumatic  Eyes: Conjunctivae are normal  No scleral icterus  Neck: Normal range of motion  Neck supple  Cardiovascular: Normal rate, regular rhythm and normal heart sounds  No murmur heard  Pulmonary/Chest: Effort normal and breath sounds normal  No respiratory distress  Abdominal: Soft  There is no tenderness  Musculoskeletal: Normal range of motion  She exhibits no edema or tenderness  Lymphadenopathy:     She has no cervical adenopathy  Neurological: She is alert and oriented to person, place, and time  No cranial nerve deficit  Skin: Skin is warm and dry  Psychiatric: She has a normal mood and affect  Vitals reviewed  Labs:  Lab Results   Component Value Date    WBC 9 67 03/27/2018    HGB 12 2 03/27/2018    HCT 37 1 03/27/2018    MCV 84 03/27/2018     03/27/2018     Lab Results   Component Value Date     12/05/2017    K 3 9 12/05/2017     12/05/2017    CO2 25 12/05/2017    ANIONGAP 8 12/05/2017    BUN 11 12/05/2017    CREATININE 0 43 (L) 12/05/2017    GLUCOSE 72 12/05/2017    CALCIUM 9 0 12/05/2017    EGFR 145 12/05/2017       Patient voiced understanding and agreement in the above discussion  Aware to contact our office with questions/symptoms in the interim

## 2018-04-12 NOTE — LETTER
April 12, 2018     Tracie Gonzales, [address] [facility] [address]  [address] [facility] [address]  [address] [address]    Patient: Bay Wolfe   YOB: 1995   Date of Visit: 4/12/2018       Dear Dr Nora Thomason: Thank you for referring Bay Wolfe to me for evaluation  Below are my notes for this consultation  If you have questions, please do not hesitate to call me  I look forward to following your patient along with you  Sincerely,        Radha Hussein PA-C        CC: No Recipients  Alcides Kaye  4/12/2018  5:42 PM  Sign at close encounter  Hematology/Oncology Outpatient Consult  Bay Wolfe 21 y o  female 1995 [id number]    Date:  4/12/2018      Assessment and Plan:  1  Von Willebrand disease ([facility] [address] )  -Patient with history of Von Willebrand disease  She recently moved here from [place]  Reviewing records, I was unable to find previous records  - Will evaluate below tests  She will have drawn today  - Advised to schedule with OB anesthesiology within the next one month  Results from the above tests will be available by the end of next week  - Follow up in 3 weeks  - von Willebrand Profile; Future  - Iron Panel; Future  - APTT; Future  - Protime-INR; Future  - Fibrinogen; Future  - CBC and differential  - Comprehensive metabolic panel      HPI:  21year old female presents for consultation for history of Von Willebrand disease  She had nosebleeds when she was a child but not in the past few years  Tonsil surgery at age 13  She had hematoma and bleeding days after that required cauterization  No need for blood transfusions before  Prior to pregnancy, periods were every 4 weeks  Lasted 8-9 days  Changing tampon every 1 hour for entire period  She was recommended to take oral iron in the past  She was inconsistent with this  She stopped taking prior to her pregnancy         ROS: Review of Systems   Constitutional: Negative for appetite change, chills, fever and unexpected weight change  HENT: Negative for congestion, mouth sores, nosebleeds, sinus pain and sore throat  Respiratory: Negative for cough, chest tightness, shortness of breath and wheezing  Cardiovascular: Negative for chest pain, palpitations and leg swelling  Gastrointestinal: Negative for abdominal pain, blood in stool, constipation, diarrhea, nausea and vomiting  Genitourinary: Negative for difficulty urinating, dysuria and hematuria  Musculoskeletal: Negative for arthralgias, joint swelling and myalgias  Skin: Negative  Neurological: Negative for dizziness, weakness, light-headedness, numbness and headaches  Hematological: Negative  Psychiatric/Behavioral: Negative          Past Medical History:   Diagnosis Date    Migraine     Placenta previa antepartum in first trimester     Varicella     As a child    Marie Kylie disease ([facility] [address] )        Past Surgical History:   Procedure Laterality Date    CHOLECYSTECTOMY      2017    GALLBLADDER SURGERY      removal- resolved    TONSILLECTOMY      in        Social History     Social History    Marital status: Single     Spouse name: N/A    Number of children: N/A    Years of education: N/A     Social History Main Topics    Smoking status: Never Smoker    Smokeless tobacco: Never Used    Alcohol use No    Drug use: No    Sexual activity: Yes     Other Topics Concern    Not on file     Social History Narrative    No narrative on file       Family History   Problem Relation Age of Onset    Diabetes Paternal Grandmother     Hypertension Paternal Grandmother     Diabetes Paternal Grandfather     No Known Problems Mother     No Known Problems Father     No Known Problems Sister     No Known Problems Brother        Allergies   Allergen Reactions    Aspirin      Von-willebrand disorder          Current Outpatient Prescriptions:     Prenatal Multivit-Min-Fe-FA (PRE- PO), Take by mouth, Disp: , Rfl:       Physical Exam:  /70 (BP Location: Right arm, Patient Position: Sitting, Cuff Size: Adult)   Pulse 75   Temp 97 8 °F (36 6 °C)   Resp 18   Ht 4' 11" (1 499 m)   Wt 80 8 kg (178 lb 3 2 oz)   LMP 06/08/2017   SpO2 98%   BMI 35 99 kg/m²      Physical Exam   Constitutional: She is oriented to person, place, and time  She appears well-developed and well-nourished  No distress  HENT:   Head: Normocephalic and atraumatic  Eyes: Conjunctivae are normal  No scleral icterus  Neck: Normal range of motion  Neck supple  Cardiovascular: Normal rate, regular rhythm and normal heart sounds  No murmur heard  Pulmonary/Chest: Effort normal and breath sounds normal  No respiratory distress  Abdominal: Soft  There is no tenderness  Musculoskeletal: Normal range of motion  She exhibits no edema or tenderness  Lymphadenopathy:     She has no cervical adenopathy  Neurological: She is alert and oriented to person, place, and time  No cranial nerve deficit  Skin: Skin is warm and dry  Psychiatric: She has a normal mood and affect  Vitals reviewed  Labs:  Lab Results   Component Value Date    WBC 9 67 03/27/2018    HGB 12 2 03/27/2018    HCT 37 1 03/27/2018    MCV 84 03/27/2018     03/27/2018     Lab Results   Component Value Date     12/05/2017    K 3 9 12/05/2017     12/05/2017    CO2 25 12/05/2017    ANIONGAP 8 12/05/2017    BUN 11 12/05/2017    CREATININE 0 43 (L) 12/05/2017    GLUCOSE 72 12/05/2017    CALCIUM 9 0 12/05/2017    EGFR 145 12/05/2017       Patient voiced understanding and agreement in the above discussion  Aware to contact our office with questions/symptoms in the interim

## 2018-04-16 ENCOUNTER — TRANSCRIBE ORDERS (OUTPATIENT)
Dept: LAB | Facility: HOSPITAL | Age: 23
End: 2018-04-16

## 2018-04-16 ENCOUNTER — APPOINTMENT (OUTPATIENT)
Dept: LAB | Facility: HOSPITAL | Age: 23
End: 2018-04-16
Payer: COMMERCIAL

## 2018-04-16 DIAGNOSIS — D68.0 VON WILLEBRAND DISEASE (HCC): ICD-10-CM

## 2018-04-16 DIAGNOSIS — D68.0 VON WILLEBRAND'S DISEASE (HCC): ICD-10-CM

## 2018-04-16 DIAGNOSIS — D68.0 VON WILLEBRAND'S DISEASE (HCC): Primary | ICD-10-CM

## 2018-04-16 LAB
ALBUMIN SERPL BCP-MCNC: 2.4 G/DL (ref 3.5–5)
ALP SERPL-CCNC: 123 U/L (ref 46–116)
ALT SERPL W P-5'-P-CCNC: 17 U/L (ref 12–78)
ANION GAP SERPL CALCULATED.3IONS-SCNC: 8 MMOL/L (ref 4–13)
APTT PPP: 39 SECONDS (ref 23–35)
AST SERPL W P-5'-P-CCNC: 12 U/L (ref 5–45)
BASOPHILS # BLD AUTO: 0.01 THOUSANDS/ΜL (ref 0–0.1)
BASOPHILS NFR BLD AUTO: 0 % (ref 0–1)
BILIRUB SERPL-MCNC: 0.31 MG/DL (ref 0.2–1)
BUN SERPL-MCNC: 6 MG/DL (ref 5–25)
CALCIUM SERPL-MCNC: 8.8 MG/DL (ref 8.3–10.1)
CHLORIDE SERPL-SCNC: 108 MMOL/L (ref 100–108)
CO2 SERPL-SCNC: 25 MMOL/L (ref 21–32)
CREAT SERPL-MCNC: 0.39 MG/DL (ref 0.6–1.3)
EOSINOPHIL # BLD AUTO: 0.06 THOUSAND/ΜL (ref 0–0.61)
EOSINOPHIL NFR BLD AUTO: 1 % (ref 0–6)
ERYTHROCYTE [DISTWIDTH] IN BLOOD BY AUTOMATED COUNT: 13.9 % (ref 11.6–15.1)
FIBRINOGEN PPP-MCNC: 616 MG/DL (ref 227–495)
GFR SERPL CREATININE-BSD FRML MDRD: 149 ML/MIN/1.73SQ M
GLUCOSE SERPL-MCNC: 85 MG/DL (ref 65–140)
HCT VFR BLD AUTO: 39.3 % (ref 34.8–46.1)
HGB BLD-MCNC: 12.4 G/DL (ref 11.5–15.4)
INR PPP: 1.11 (ref 0.86–1.16)
LYMPHOCYTES # BLD AUTO: 2.03 THOUSANDS/ΜL (ref 0.6–4.47)
LYMPHOCYTES NFR BLD AUTO: 20 % (ref 14–44)
MCH RBC QN AUTO: 27.1 PG (ref 26.8–34.3)
MCHC RBC AUTO-ENTMCNC: 31.6 G/DL (ref 31.4–37.4)
MCV RBC AUTO: 86 FL (ref 82–98)
MONOCYTES # BLD AUTO: 0.7 THOUSAND/ΜL (ref 0.17–1.22)
MONOCYTES NFR BLD AUTO: 7 % (ref 4–12)
NEUTROPHILS # BLD AUTO: 7.41 THOUSANDS/ΜL (ref 1.85–7.62)
NEUTS SEG NFR BLD AUTO: 72 % (ref 43–75)
NRBC BLD AUTO-RTO: 0 /100 WBCS
PLATELET # BLD AUTO: 197 THOUSANDS/UL (ref 149–390)
PMV BLD AUTO: 9.6 FL (ref 8.9–12.7)
POTASSIUM SERPL-SCNC: 3.8 MMOL/L (ref 3.5–5.3)
PROT SERPL-MCNC: 6.9 G/DL (ref 6.4–8.2)
PROTHROMBIN TIME: 14.3 SECONDS (ref 12.1–14.4)
RBC # BLD AUTO: 4.58 MILLION/UL (ref 3.81–5.12)
SODIUM SERPL-SCNC: 141 MMOL/L (ref 136–145)
WBC # BLD AUTO: 10.28 THOUSAND/UL (ref 4.31–10.16)

## 2018-04-16 PROCEDURE — 85245 CLOT FACTOR VIII VW RISTOCTN: CPT

## 2018-04-16 PROCEDURE — 85610 PROTHROMBIN TIME: CPT

## 2018-04-16 PROCEDURE — 80053 COMPREHEN METABOLIC PANEL: CPT | Performed by: PHYSICIAN ASSISTANT

## 2018-04-16 PROCEDURE — 85384 FIBRINOGEN ACTIVITY: CPT

## 2018-04-16 PROCEDURE — 36415 COLL VENOUS BLD VENIPUNCTURE: CPT | Performed by: PHYSICIAN ASSISTANT

## 2018-04-16 PROCEDURE — 85025 COMPLETE CBC W/AUTO DIFF WBC: CPT | Performed by: PHYSICIAN ASSISTANT

## 2018-04-16 PROCEDURE — 85730 THROMBOPLASTIN TIME PARTIAL: CPT

## 2018-04-18 ENCOUNTER — TELEPHONE (OUTPATIENT)
Dept: OBGYN CLINIC | Facility: CLINIC | Age: 23
End: 2018-04-18

## 2018-04-18 LAB — VWF:RCO ACT/NOR PPP PL AGG: 66 % (ref 50–200)

## 2018-04-19 ENCOUNTER — ROUTINE PRENATAL (OUTPATIENT)
Dept: OBGYN CLINIC | Facility: CLINIC | Age: 23
End: 2018-04-19

## 2018-04-19 ENCOUNTER — ULTRASOUND (OUTPATIENT)
Dept: PERINATAL CARE | Facility: CLINIC | Age: 23
End: 2018-04-19
Payer: COMMERCIAL

## 2018-04-19 VITALS
BODY MASS INDEX: 36.89 KG/M2 | WEIGHT: 183 LBS | HEART RATE: 88 BPM | DIASTOLIC BLOOD PRESSURE: 66 MMHG | HEIGHT: 59 IN | SYSTOLIC BLOOD PRESSURE: 120 MMHG

## 2018-04-19 VITALS — SYSTOLIC BLOOD PRESSURE: 110 MMHG | WEIGHT: 184.6 LBS | DIASTOLIC BLOOD PRESSURE: 60 MMHG | BODY MASS INDEX: 37.28 KG/M2

## 2018-04-19 DIAGNOSIS — Z34.03 ENCOUNTER FOR SUPERVISION OF NORMAL FIRST PREGNANCY IN THIRD TRIMESTER: Primary | ICD-10-CM

## 2018-04-19 DIAGNOSIS — Z36.2 ENCOUNTER FOR OTHER ANTENATAL SCREENING FOLLOW-UP: Primary | ICD-10-CM

## 2018-04-19 DIAGNOSIS — Z3A.32 32 WEEKS GESTATION OF PREGNANCY: ICD-10-CM

## 2018-04-19 DIAGNOSIS — D68.0 VON WILLEBRAND DISEASE (HCC): ICD-10-CM

## 2018-04-19 PROCEDURE — 76816 OB US FOLLOW-UP PER FETUS: CPT | Performed by: OBSTETRICS & GYNECOLOGY

## 2018-04-19 PROCEDURE — PNV: Performed by: PHYSICIAN ASSISTANT

## 2018-04-19 PROCEDURE — 99212 OFFICE O/P EST SF 10 MIN: CPT | Performed by: OBSTETRICS & GYNECOLOGY

## 2018-04-19 NOTE — PATIENT INSTRUCTIONS

## 2018-04-30 ENCOUNTER — TELEPHONE (OUTPATIENT)
Dept: HEMATOLOGY ONCOLOGY | Facility: CLINIC | Age: 23
End: 2018-04-30

## 2018-05-02 ENCOUNTER — TELEPHONE (OUTPATIENT)
Dept: OBGYN CLINIC | Facility: CLINIC | Age: 23
End: 2018-05-02

## 2018-05-02 NOTE — TELEPHONE ENCOUNTER
Spoke to patient  States that she confirmed with her employee health office that she did NOT have Tdap updated  Advised her that we will plan to give it to her at her next visit  She has an appt with anesthesia on 5/16/18  Will be following up with hematology after she has more lab work done

## 2018-05-08 ENCOUNTER — ROUTINE PRENATAL (OUTPATIENT)
Dept: OBGYN CLINIC | Facility: CLINIC | Age: 23
End: 2018-05-08
Payer: COMMERCIAL

## 2018-05-08 ENCOUNTER — APPOINTMENT (OUTPATIENT)
Dept: LAB | Facility: HOSPITAL | Age: 23
End: 2018-05-08
Attending: INTERNAL MEDICINE
Payer: COMMERCIAL

## 2018-05-08 VITALS — WEIGHT: 187.4 LBS | SYSTOLIC BLOOD PRESSURE: 100 MMHG | DIASTOLIC BLOOD PRESSURE: 62 MMHG | BODY MASS INDEX: 37.85 KG/M2

## 2018-05-08 DIAGNOSIS — Z3A.35 35 WEEKS GESTATION OF PREGNANCY: Primary | ICD-10-CM

## 2018-05-08 DIAGNOSIS — Z23 NEED FOR TDAP VACCINATION: ICD-10-CM

## 2018-05-08 DIAGNOSIS — D68.0 VON WILLEBRAND'S DISEASE (HCC): ICD-10-CM

## 2018-05-08 DIAGNOSIS — D68.0 VON WILLEBRAND DISEASE (HCC): ICD-10-CM

## 2018-05-08 LAB
APTT 1H NP PPP: 35 SECONDS (ref 24–36)
APTT IMM NP PPP: 32 SECONDS (ref 24–36)
APTT PPP: 36 SECONDS (ref 23–35)
FERRITIN SERPL-MCNC: 11 NG/ML (ref 8–388)
IRON SATN MFR SERPL: 16 %
IRON SERPL-MCNC: 90 UG/DL (ref 50–170)
TIBC SERPL-MCNC: 573 UG/DL (ref 250–450)

## 2018-05-08 PROCEDURE — 85732 THROMBOPLASTIN TIME PARTIAL: CPT

## 2018-05-08 PROCEDURE — 83540 ASSAY OF IRON: CPT

## 2018-05-08 PROCEDURE — 36415 COLL VENOUS BLD VENIPUNCTURE: CPT

## 2018-05-08 PROCEDURE — 90471 IMMUNIZATION ADMIN: CPT

## 2018-05-08 PROCEDURE — 90715 TDAP VACCINE 7 YRS/> IM: CPT

## 2018-05-08 PROCEDURE — 85246 CLOT FACTOR VIII VW ANTIGEN: CPT

## 2018-05-08 PROCEDURE — 85240 CLOT FACTOR VIII AHG 1 STAGE: CPT

## 2018-05-08 PROCEDURE — 83550 IRON BINDING TEST: CPT

## 2018-05-08 PROCEDURE — PNV: Performed by: NURSE PRACTITIONER

## 2018-05-08 PROCEDURE — 85245 CLOT FACTOR VIII VW RISTOCTN: CPT

## 2018-05-08 PROCEDURE — 82728 ASSAY OF FERRITIN: CPT

## 2018-05-08 PROCEDURE — 87653 STREP B DNA AMP PROBE: CPT | Performed by: NURSE PRACTITIONER

## 2018-05-10 LAB
FACT XIIIA PPP-ACNC: 30 % (ref 57–163)
VWF AG ACT/NOR PPP IA: 278 % (ref 50–200)
VWF:RCO ACT/NOR PPP PL AGG: 154 % (ref 50–200)

## 2018-05-11 LAB — GP B STREP DNA SPEC QL NAA+PROBE: ABNORMAL

## 2018-05-14 DIAGNOSIS — Z78.9 BREASTFEEDING (INFANT): Primary | ICD-10-CM

## 2018-05-14 PROBLEM — B95.1 POSITIVE GBS TEST: Status: ACTIVE | Noted: 2018-05-14

## 2018-05-15 ENCOUNTER — ROUTINE PRENATAL (OUTPATIENT)
Dept: OBGYN CLINIC | Facility: CLINIC | Age: 23
End: 2018-05-15

## 2018-05-15 VITALS — SYSTOLIC BLOOD PRESSURE: 110 MMHG | WEIGHT: 187.4 LBS | DIASTOLIC BLOOD PRESSURE: 64 MMHG | BODY MASS INDEX: 37.85 KG/M2

## 2018-05-15 DIAGNOSIS — B95.1 POSITIVE GBS TEST: ICD-10-CM

## 2018-05-15 DIAGNOSIS — Z3A.36 36 WEEKS GESTATION OF PREGNANCY: Primary | ICD-10-CM

## 2018-05-15 DIAGNOSIS — D68.0 VON WILLEBRAND DISEASE (HCC): ICD-10-CM

## 2018-05-15 PROCEDURE — PNV: Performed by: OBSTETRICS & GYNECOLOGY

## 2018-05-15 NOTE — PROGRESS NOTES
Assessment     Pregnancy 36 and 1/7 weeks     Plan     28-week labs reviewed, positive for von Willebrand's  Patient saw Hematology for workup  U further workup d of what type of von Willebrand's  Patient had lab work for workup and will go next week for results  GBS positive  Explained to patient she will have antibiotics during labor and that this is normal   Follow up in 1 Week  Joaquin Perez is a 21 y o  female being seen today for her obstetrical visit  She is at 36w1d gestation  Patient reports no bleeding, no contractions, no cramping and no leaking  Fetal movement: normal     Menstrual History:  OB History      Para Term  AB Living    1 0 0 0 0 0    SAB TAB Ectopic Multiple Live Births    0 0 0 0 0        Patient's last menstrual period was 2017  The following portions of the patient's history were reviewed and updated as appropriate: allergies, current medications, past family history, past medical history, past social history, past surgical history and problem list     Review of Systems  Pertinent items are noted in HPI       Objective     /64   Wt 85 kg (187 lb 6 4 oz)   LMP 2017   BMI 37 85 kg/m²   FHT:  170 BPM   Uterine Size: size equals dates   Presentation: cephalic

## 2018-05-16 ENCOUNTER — CLINICAL SUPPORT (OUTPATIENT)
Dept: PERINATAL CARE | Facility: CLINIC | Age: 23
End: 2018-05-16

## 2018-05-16 VITALS
WEIGHT: 188.8 LBS | BODY MASS INDEX: 38.06 KG/M2 | DIASTOLIC BLOOD PRESSURE: 76 MMHG | HEART RATE: 85 BPM | SYSTOLIC BLOOD PRESSURE: 114 MMHG | HEIGHT: 59 IN

## 2018-05-16 DIAGNOSIS — Z36.9 ENCOUNTER FOR ANTENATAL SCREENING OF MOTHER: Primary | ICD-10-CM

## 2018-05-16 NOTE — PROGRESS NOTES
OB Anesthesia Consult  Norberto Rodriguez 21 y o  female MRN: [id number]    HPI   with AUSTEN of 18 who presents to discuss anesthesia options for her upcoming delivery  She has a history of vWD and is currently being worked up by hematology  Her symptoms including easy bruising and heavy periods, when she was 15 she had a tonsillectomy and on POD#1 she developed a hematoma requiring cauterization  She has had part of the labs ordered by hematology drawn and evaluated, she had the remainder drawn yesterday and we are awaiting their results  VWF activity is in the normal range and APTT is only slightly elevated at 36 and has been improving during pregnancy  We are still awaiting factor 8 activity and ristocetin activity to fully evaluate her workup  Meds/Allergies     Allergies   Allergen Reactions    Aspirin      Von-willebrand disorder        HR:  [85] 85  BP: (110-114)/(64-76) 114/76    Physical Exam: /76   Pulse 85   Ht 4' 11" (1 499 m)   Wt 85 6 kg (188 lb 12 8 oz)   LMP 2017   BMI 38 13 kg/m²   Gen: Well appearing and well nourished, NAD  Skin: Warm, Dry   HEENT:  PERRLA, EOMI  CV: RRR, +s1/s2, no M/R/G  Pul: Lungs CTAB  Abd: Soft, non-tender, gravid abdomen  Ext:  No cyanosis or edema  Neuro: AAOx3; CN II-XII grossly intact; 5/5 BLUE, 5/5 BLLE; Sensation intact grossly     Lab Results:  Lab Results   Component Value Date    WBC 10 28 (H) 2018    HGB 12 4 2018    HCT 39 3 2018    MCV 86 2018     2018     Lab Results   Component Value Date    GLUCOSE 85 2018    CALCIUM 8 8 2018     2018    K 3 8 2018    CO2 25 2018     2018    BUN 6 2018    CREATININE 0 39 (L) 2018       Plan:   Currently it appears that she has a mild form of vWD, most likely type I but we will have to wait for the remainder of her lab work and evaluation from hematology for confirmation   Literature concerning the safety of neuraxial analgesia for labor with vWD is sparse however the available evidence shows a low complication rate and the majority of patients do not require treatment, those that are treated receive a dose of DDAVP or factor VIII  Pending the final lab results and evaluation it would be reasonable for the patient to receive a dose of DDAVP prior to epidural placement a second dose before epidural removal  Final recommendation can be made after all lab work is complete and input from hematology         SIGNATURE: Demetrio Gomez MD  DATE: May 16, 2018  TIME: 3:31 PM

## 2018-05-17 ENCOUNTER — HOSPITAL ENCOUNTER (OUTPATIENT)
Facility: HOSPITAL | Age: 23
Discharge: HOME | End: 2018-05-17
Attending: OBSTETRICS & GYNECOLOGY | Admitting: OBSTETRICS & GYNECOLOGY
Payer: COMMERCIAL

## 2018-05-17 ENCOUNTER — HOSPITAL ENCOUNTER (OUTPATIENT)
Facility: HOSPITAL | Age: 23
End: 2018-05-17
Attending: OBSTETRICS & GYNECOLOGY | Admitting: OBSTETRICS & GYNECOLOGY
Payer: COMMERCIAL

## 2018-05-17 VITALS
WEIGHT: 188 LBS | TEMPERATURE: 98.3 F | HEART RATE: 74 BPM | HEIGHT: 59 IN | OXYGEN SATURATION: 100 % | DIASTOLIC BLOOD PRESSURE: 63 MMHG | SYSTOLIC BLOOD PRESSURE: 111 MMHG | RESPIRATION RATE: 20 BRPM | BODY MASS INDEX: 37.9 KG/M2

## 2018-05-17 PROCEDURE — G0463 HOSPITAL OUTPT CLINIC VISIT: HCPCS

## 2018-05-17 PROCEDURE — 99214 OFFICE O/P EST MOD 30 MIN: CPT

## 2018-05-17 NOTE — PROGRESS NOTES
L&D Triage Note - OB/GYN  Juan Carlos Douglas 21 y o  female MRN: [id number]  Unit/Bed#: LD Triage 2-01 Encounter: [id number]      SUBJECTIVE:  Juan Carlos Rios y o  Mike Brito at 36w3d complaining of contractions and pelvic pressure  States that she is vane every 10-15 minutes and has increased pelvic pressure  She is instructed by hr OB to be evaluated on L&D  Her current obstetrical history is significant for late transfer of care, BMI of 37, von Willebrand's disease  Contractions: Date/time of onset: 5/16/18 at 2000, Frequency: Every 15 minutes, Duration: 60 seconds and Intensity: moderate  Leakage of fluid: None  Vaginal Bleeding: None  Fetal movement: present  OBJECTIVE:  Vitals:    05/17/18 0200   BP: 111/63   Pulse: 74   Resp: 20   Temp: 98 3 °F (36 8 °C)   SpO2:          SVE: FT / 30% / -3    FHT:  120bpm baseline / Moderate 6 - 25 bpm / reactive, no decel  [place]: q 10 - 15 min      ASSESSMENT:  Marni Rios y o  Mike Brito at 36w3d complaining of contractions, stable cervix - not in labor    PLAN:  1  Continue routine prenatal care, instructed to call if she starts to experience regular contractions every 5 minutes or less, leakage of fluid, vaginal bleeding, or decreased fetal movement  Reviewed fetal kick counts  2  Discharge from [facility] triage, recommended Tylenol, heating pads, hot showers, and increase oral hydration  3   Case discussed with Dr Xochitl Arita MD  OB/GYN PGY-2  5/17/2018 3:22 AM

## 2018-05-17 NOTE — PATIENT INSTRUCTIONS
Patient is here today for her routine 36 week obstetrical visit     She states that she is feeling well and has no complaints at this time      Baby is moving well      She denies any bleeding or loss of fluid or any abdominal pain       She had a level 2 scan at the  center recently which was within normal limits     Blood pressure is normal today and urine screens are all negative     I encouraged her to continue to eat well and also to take adequate amounts of fluid on a daily basis     She is taking her prenatal vitamins daily     All questions were answered for her      We will see her back in 1 weeks for routine prenatal visit     She was told to call should any problems or concerns arise during the interim    Cervical exam today was finger tip and posterior

## 2018-05-22 ENCOUNTER — ROUTINE PRENATAL (OUTPATIENT)
Dept: OBGYN CLINIC | Facility: CLINIC | Age: 23
End: 2018-05-22

## 2018-05-22 VITALS — DIASTOLIC BLOOD PRESSURE: 64 MMHG | SYSTOLIC BLOOD PRESSURE: 112 MMHG | WEIGHT: 194 LBS | BODY MASS INDEX: 39.18 KG/M2

## 2018-05-22 DIAGNOSIS — R82.998 LEUKOCYTES IN URINE: ICD-10-CM

## 2018-05-22 DIAGNOSIS — Z3A.37 37 WEEKS GESTATION OF PREGNANCY: Primary | ICD-10-CM

## 2018-05-22 PROCEDURE — 87147 CULTURE TYPE IMMUNOLOGIC: CPT | Performed by: OBSTETRICS & GYNECOLOGY

## 2018-05-22 PROCEDURE — PNV: Performed by: OBSTETRICS & GYNECOLOGY

## 2018-05-22 PROCEDURE — 87086 URINE CULTURE/COLONY COUNT: CPT | Performed by: OBSTETRICS & GYNECOLOGY

## 2018-05-22 NOTE — PATIENT INSTRUCTIONS
Patient is here today for her routine 37week obstetrical visit     She states that she is feeling well and has no complaints at this time      Baby is moving well      She denies any bleeding or loss of fluid or any abdominal pain       She had a level 2 scan at the  center recently which was within normal limits     Blood pressure is normal today and urine screens are all negative     I encouraged her to continue to eat well and also to take adequate amounts of fluid on a daily basis     She is taking her prenatal vitamins daily     All questions were answered for her      We will see her back in 1 weeks for routine prenatal visit     She was told to call should any problems or concerns arise during the interim    Patient will be scheduled for an obstetrical ultrasound for estimated fetal weight this week

## 2018-05-22 NOTE — LETTER
May 22, 2018     Patient: Jocelyn Knutson   YOB: 1995   Date of Visit: 5/22/2018       To Whom it May Concern:    Jocelyn Knutson is under my professional care  She was seen in my office on 5/22/2018  She may return to work on 05/22/2018  Please excuse PT for been out work the days of 05/17/18, 05/18/2018 and 05/21/2018 due to being 37 weeks pregnant with extreme pelvic pain and excessive swelling  If you have any questions or concerns, please don't hesitate to call   Thank You         Sincerely,          Rachele Cai MD        CC: No Recipients

## 2018-05-22 NOTE — PROGRESS NOTES
Assessment     Pregnancy 37 and 1/7 weeks          Plan     Plans for delivery: Vaginal anticipated  Beta strep culture: done  Counseling: Consent signed  Fetal testing: discussed  Follow up in 1 Week  Mica Callahan is a 21 y o  female being seen today for her obstetrical visit  She is at 37w1d gestation  Patient reports no complaints  Fetal movement: normal     Denies any headaches or visual changes    Denies any vaginal bleeding or loss of fluid    Patient is eating well and taking adequate amounts of fluid        Menstrual History:  OB History      Para Term  AB Living    1 0 0 0 0 0    SAB TAB Ectopic Multiple Live Births    0 0 0 0 0         Menarche age:   Patient's last menstrual period was 2017  The following portions of the patient's history were reviewed and updated as appropriate: allergies, current medications, past family history, past medical history, past social history, past surgical history and problem list     Review of Systems  Pertinent items are noted in HPI       Objective     /64   Wt 88 kg (194 lb)   LMP 2017   BMI 39 18 kg/m²   FHT: 160 BPM   Uterine Size: 39 cm   Presentations: cephalic   Pelvic Exam:     Dilation: 1cm    Effacement: 50%    Station:  Floating    Consistency: soft    Position: posterior

## 2018-05-24 ENCOUNTER — TRANSCRIBE ORDERS (OUTPATIENT)
Dept: LAB | Facility: HOSPITAL | Age: 23
End: 2018-05-24

## 2018-05-24 ENCOUNTER — TELEPHONE (OUTPATIENT)
Dept: OBGYN CLINIC | Facility: CLINIC | Age: 23
End: 2018-05-24

## 2018-05-24 ENCOUNTER — ULTRASOUND (OUTPATIENT)
Dept: OBGYN CLINIC | Facility: CLINIC | Age: 23
End: 2018-05-24
Payer: COMMERCIAL

## 2018-05-24 DIAGNOSIS — O36.63X0 ENCOUNTER FOR MATERNAL CARE FOR EXCESSIVE FETAL GROWTH IN THIRD TRIMESTER, SINGLE OR UNSPECIFIED FETUS: Primary | ICD-10-CM

## 2018-05-24 PROCEDURE — 85246 CLOT FACTOR VIII VW ANTIGEN: CPT

## 2018-05-24 PROCEDURE — 76815 OB US LIMITED FETUS(S): CPT | Performed by: OBSTETRICS & GYNECOLOGY

## 2018-05-24 PROCEDURE — 36415 COLL VENOUS BLD VENIPUNCTURE: CPT

## 2018-05-24 PROCEDURE — 85247 CLOT FACTOR VIII MULTIMETRIC: CPT

## 2018-05-25 DIAGNOSIS — N39.0 URINARY TRACT INFECTION WITHOUT HEMATURIA, SITE UNSPECIFIED: Primary | ICD-10-CM

## 2018-05-25 RX ORDER — AMOXICILLIN 500 MG/1
CAPSULE ORAL
Qty: 28 CAPSULE | Refills: 1 | Status: SHIPPED | OUTPATIENT
Start: 2018-05-25 | End: 2018-06-01

## 2018-05-26 LAB
BACTERIA UR CULT: ABNORMAL

## 2018-05-29 ENCOUNTER — TELEPHONE (OUTPATIENT)
Dept: OBGYN CLINIC | Facility: CLINIC | Age: 23
End: 2018-05-29

## 2018-05-29 ENCOUNTER — ROUTINE PRENATAL (OUTPATIENT)
Dept: OBGYN CLINIC | Facility: CLINIC | Age: 23
End: 2018-05-29
Payer: COMMERCIAL

## 2018-05-29 VITALS — BODY MASS INDEX: 39.59 KG/M2 | WEIGHT: 196 LBS | DIASTOLIC BLOOD PRESSURE: 74 MMHG | SYSTOLIC BLOOD PRESSURE: 158 MMHG

## 2018-05-29 DIAGNOSIS — Z3A.38 38 WEEKS GESTATION OF PREGNANCY: Primary | ICD-10-CM

## 2018-05-29 LAB
SL AMB  POCT GLUCOSE, UA: NEGATIVE
SL AMB LEUKOCYTE ESTERASE,UA: NEGATIVE
SL AMB POCT KETONES,UA: NEGATIVE
SL AMB POCT URINE PROTEIN: NEGATIVE

## 2018-05-29 PROCEDURE — PNV: Performed by: OBSTETRICS & GYNECOLOGY

## 2018-05-29 PROCEDURE — 81002 URINALYSIS NONAUTO W/O SCOPE: CPT | Performed by: OBSTETRICS & GYNECOLOGY

## 2018-05-29 NOTE — PATIENT INSTRUCTIONS
Patient is here today for her routine 38 week obstetrical visit     She states that she is feeling well and has no complaints at this time      Baby is moving well      She denies any bleeding or loss of fluid or any abdominal pain       She had a level 2 scan at the  center recently which was within normal limits     Blood pressure is normal today and urine screens are all negative  Repeat BP was 110/72     I encouraged her to continue to eat well and also to take adequate amounts of fluid on a daily basis     She is taking her prenatal vitamins daily     All questions were answered for her      We will see her back in 1 weeks for routine prenatal visit     She was told to call should any problems or concerns arise during the interim

## 2018-05-29 NOTE — PROGRESS NOTES
Assessment     Pregnancy 38 and 1/7 weeks     Plan     Plans for delivery: Vaginal anticipated  Beta strep culture: done  Counseling: Consent signed  Fetal testing: no  Follow up in 1 Week  Patient is here today for her routine 38 week obstetrical visit     She states that she is feeling well and has no complaints at this time      Baby is moving well      She denies any bleeding or loss of fluid or any abdominal pain       She had a level 2 scan at the  center recently which was within normal limits     Blood pressure is normal today and urine screens are all negative   Repeat BP was 110/72     I encouraged her to continue to eat well and also to take adequate amounts of fluid on a daily basis     She is taking her prenatal vitamins daily     All questions were answered for her      We will see her back in 1 weeks for routine prenatal visit     She was told to call should any problems or concerns arise during the interim          Sheeba Younger is a 21 y o  female being seen today for her obstetrical visit  She is at 38w1d gestation  Patient reports no complaints  Fetal movement: normal     Menstrual History:  OB History      Para Term  AB Living    1 0 0 0 0 0    SAB TAB Ectopic Multiple Live Births    0 0 0 0 0         Menarche age:   Patient's last menstrual period was 2017  The following portions of the patient's history were reviewed and updated as appropriate: allergies, current medications, past family history, past medical history, past social history, past surgical history and problem list     Review of Systems  Pertinent items are noted in HPI       Objective     /74   Wt 88 9 kg (196 lb)   LMP 2017   BMI 39 59 kg/m²   FHT: 152 BPM   Uterine Size: size equals dates   Presentations: cephalic   Pelvic Exam:     Dilation: 1cm    Effacement: 50%    Station:  -3    Consistency: firm    Position: posterior      Assessment/Plan:      Diagnoses and all orders for this visit:    38 weeks gestation of pregnancy  -     POCT urine dip          Subjective:     Patient ID: Ayo Brito is a 21 y o  female      HPI    Review of Systems      Objective:     Physical Exam

## 2018-05-30 ENCOUNTER — TELEPHONE (OUTPATIENT)
Dept: OBGYN CLINIC | Facility: CLINIC | Age: 23
End: 2018-05-30

## 2018-05-30 ENCOUNTER — TELEPHONE (OUTPATIENT)
Dept: GYNECOLOGY | Facility: CLINIC | Age: 23
End: 2018-05-30

## 2018-05-30 LAB — FACT VIII AG ACT/NOR PPP IA: 38 %

## 2018-06-01 LAB — VWF MULTIMERS PPP IB: NORMAL

## 2018-06-05 ENCOUNTER — ROUTINE PRENATAL (OUTPATIENT)
Dept: OBGYN CLINIC | Facility: CLINIC | Age: 23
End: 2018-06-05

## 2018-06-05 VITALS — SYSTOLIC BLOOD PRESSURE: 134 MMHG | DIASTOLIC BLOOD PRESSURE: 76 MMHG | BODY MASS INDEX: 40.35 KG/M2 | WEIGHT: 199.8 LBS

## 2018-06-05 DIAGNOSIS — Z3A.39 39 WEEKS GESTATION OF PREGNANCY: Primary | ICD-10-CM

## 2018-06-05 PROCEDURE — PNV: Performed by: OBSTETRICS & GYNECOLOGY

## 2018-06-06 ENCOUNTER — TELEPHONE (OUTPATIENT)
Dept: HEMATOLOGY ONCOLOGY | Facility: CLINIC | Age: 23
End: 2018-06-06

## 2018-06-06 NOTE — TELEPHONE ENCOUNTER
Patient needs to come in to the office tomorrow 6/7/18  She has missed her follow up appointments  We need to discuss blood work prior to delivery  She is already at term

## 2018-06-06 NOTE — TELEPHONE ENCOUNTER
LVM informing patient that PA Fran Carrel would like to see her in the office tomorrow at 3:00 pm  I have scheduled her for that time slot  She is getting closer to term and PA Fran Carrel would like to have her get her labs done prior to delivery to insure her and baby will be ok  Please R/S patient appt if the date or time does not work for her schedule and please inform me of any change

## 2018-06-06 NOTE — PATIENT INSTRUCTIONS
Patient is here today for her routine **39 week obstetrical visit     She states that she is feeling well and has no complaints at this time      Baby is moving well      She denies any bleeding or loss of fluid or any abdominal pain       She had a level 2 scan at the  center recently which was within normal limits     Blood pressure is normal today and urine screens are all negative     I encouraged her to continue to eat well and also to take adequate amounts of fluid on a daily basis     She is taking her prenatal vitamins daily     All questions were answered for her      We will see her back in 1 weeks for routine prenatal visit     She was told to call should any problems or concerns arise during the interim

## 2018-06-06 NOTE — TELEPHONE ENCOUNTER
Scheduled patient for 06/07/18 at 3:00 pm  Called LVM informing her of the appt  And the reason for the appt per PA [place]

## 2018-06-06 NOTE — PROGRESS NOTES
Assessment     Pregnancy 39 and 1/7 weeks     Plan     Plans for delivery: Vaginal anticipated  Beta strep culture: done  Counseling: Consent signed  Fetal testing: discussed  Follow up in 1 Week  Leanne Gomez is a 21 y o  female being seen today for her obstetrical visit  She is at 39w2d gestation  Patient reports no complaints  Fetal movement: normal     Menstrual History:  OB History      Para Term  AB Living    1 0 0 0 0 0    SAB TAB Ectopic Multiple Live Births    0 0 0 0 0         Menarche age:   Patient's last menstrual period was 2017  The following portions of the patient's history were reviewed and updated as appropriate: allergies, current medications, past family history, past medical history, past social history, past surgical history and problem list     Review of Systems  Pertinent items are noted in HPI  Objective     /76   Wt 90 6 kg (199 lb 12 8 oz)   LMP 2017   BMI 40 35 kg/m²   FHT: 137 BPM   Uterine Size: size equals dates   Presentations: cephalic   Pelvic Exam:     Dilation: FT    Effacement:  Thick    Station:  -3    Consistency: firm    Position: middle

## 2018-06-07 ENCOUNTER — ANESTHESIA EVENT (INPATIENT)
Dept: LABOR AND DELIVERY | Facility: HOSPITAL | Age: 23
End: 2018-06-07
Payer: COMMERCIAL

## 2018-06-07 ENCOUNTER — TELEPHONE (OUTPATIENT)
Dept: HEMATOLOGY ONCOLOGY | Facility: CLINIC | Age: 23
End: 2018-06-07

## 2018-06-07 ENCOUNTER — HOSPITAL ENCOUNTER (INPATIENT)
Facility: HOSPITAL | Age: 23
LOS: 5 days | Discharge: HOME | End: 2018-06-12
Attending: OBSTETRICS & GYNECOLOGY | Admitting: OBSTETRICS & GYNECOLOGY
Payer: COMMERCIAL

## 2018-06-07 ENCOUNTER — ANESTHESIA (INPATIENT)
Dept: LABOR AND DELIVERY | Facility: HOSPITAL | Age: 23
End: 2018-06-07
Payer: COMMERCIAL

## 2018-06-07 DIAGNOSIS — Z3A.39 39 WEEKS GESTATION OF PREGNANCY: ICD-10-CM

## 2018-06-07 DIAGNOSIS — D68.0 VON WILLEBRAND DISEASE (HCC): ICD-10-CM

## 2018-06-07 DIAGNOSIS — Z98.891 S/P CESAREAN SECTION: Primary | ICD-10-CM

## 2018-06-07 LAB
ABO GROUP BLD: NORMAL
APTT PPP: 38 SECONDS (ref 24–36)
BASOPHILS # BLD AUTO: 0.02 THOUSANDS/ΜL (ref 0–0.1)
BASOPHILS NFR BLD AUTO: 0 % (ref 0–1)
BLD GP AB SCN SERPL QL: NEGATIVE
EOSINOPHIL # BLD AUTO: 0.07 THOUSAND/ΜL (ref 0–0.61)
EOSINOPHIL NFR BLD AUTO: 1 % (ref 0–6)
ERYTHROCYTE [DISTWIDTH] IN BLOOD BY AUTOMATED COUNT: 16.1 % (ref 11.6–15.1)
HCT VFR BLD AUTO: 41.9 % (ref 34.8–46.1)
HGB BLD-MCNC: 13.4 G/DL (ref 11.5–15.4)
IMM GRANULOCYTES # BLD AUTO: 0.1 THOUSAND/UL (ref 0–0.2)
IMM GRANULOCYTES NFR BLD AUTO: 1 % (ref 0–2)
INR PPP: 1.01 (ref 0.86–1.17)
LYMPHOCYTES # BLD AUTO: 2.09 THOUSANDS/ΜL (ref 0.6–4.47)
LYMPHOCYTES NFR BLD AUTO: 17 % (ref 14–44)
MCH RBC QN AUTO: 27.1 PG (ref 26.8–34.3)
MCHC RBC AUTO-ENTMCNC: 32 G/DL (ref 31.4–37.4)
MCV RBC AUTO: 85 FL (ref 82–98)
MONOCYTES # BLD AUTO: 0.75 THOUSAND/ΜL (ref 0.17–1.22)
MONOCYTES NFR BLD AUTO: 6 % (ref 4–12)
NEUTROPHILS # BLD AUTO: 9.02 THOUSANDS/ΜL (ref 1.85–7.62)
NEUTS SEG NFR BLD AUTO: 75 % (ref 43–75)
NRBC BLD AUTO-RTO: 0 /100 WBCS
PLATELET # BLD AUTO: 208 THOUSANDS/UL (ref 149–390)
PMV BLD AUTO: 10.1 FL (ref 8.9–12.7)
PROTHROMBIN TIME: 13.4 SECONDS (ref 11.8–14.2)
RBC # BLD AUTO: 4.95 MILLION/UL (ref 3.81–5.12)
RH BLD: POSITIVE
SPECIMEN EXPIRATION DATE: NORMAL
WBC # BLD AUTO: 12.05 THOUSAND/UL (ref 4.31–10.16)

## 2018-06-07 PROCEDURE — 86850 RBC ANTIBODY SCREEN: CPT | Performed by: STUDENT IN AN ORGANIZED HEALTH CARE EDUCATION/TRAINING PROGRAM

## 2018-06-07 PROCEDURE — 85025 COMPLETE CBC W/AUTO DIFF WBC: CPT | Performed by: STUDENT IN AN ORGANIZED HEALTH CARE EDUCATION/TRAINING PROGRAM

## 2018-06-07 PROCEDURE — G0463 HOSPITAL OUTPT CLINIC VISIT: HCPCS

## 2018-06-07 PROCEDURE — 86920 COMPATIBILITY TEST SPIN: CPT

## 2018-06-07 PROCEDURE — 85610 PROTHROMBIN TIME: CPT | Performed by: STUDENT IN AN ORGANIZED HEALTH CARE EDUCATION/TRAINING PROGRAM

## 2018-06-07 PROCEDURE — 85730 THROMBOPLASTIN TIME PARTIAL: CPT | Performed by: STUDENT IN AN ORGANIZED HEALTH CARE EDUCATION/TRAINING PROGRAM

## 2018-06-07 PROCEDURE — 86901 BLOOD TYPING SEROLOGIC RH(D): CPT | Performed by: STUDENT IN AN ORGANIZED HEALTH CARE EDUCATION/TRAINING PROGRAM

## 2018-06-07 PROCEDURE — 86592 SYPHILIS TEST NON-TREP QUAL: CPT | Performed by: STUDENT IN AN ORGANIZED HEALTH CARE EDUCATION/TRAINING PROGRAM

## 2018-06-07 PROCEDURE — 99213 OFFICE O/P EST LOW 20 MIN: CPT

## 2018-06-07 PROCEDURE — 10907ZC DRAINAGE OF AMNIOTIC FLUID, THERAPEUTIC FROM PRODUCTS OF CONCEPTION, VIA NATURAL OR ARTIFICIAL OPENING: ICD-10-PCS | Performed by: OBSTETRICS & GYNECOLOGY

## 2018-06-07 PROCEDURE — 86900 BLOOD TYPING SEROLOGIC ABO: CPT | Performed by: STUDENT IN AN ORGANIZED HEALTH CARE EDUCATION/TRAINING PROGRAM

## 2018-06-07 RX ORDER — SODIUM CHLORIDE FOR INHALATION 0.9 %
VIAL, NEBULIZER (ML) INHALATION
Status: DISPENSED
Start: 2018-06-07 | End: 2018-06-08

## 2018-06-07 RX ORDER — SODIUM CHLORIDE, SODIUM LACTATE, POTASSIUM CHLORIDE, CALCIUM CHLORIDE 600; 310; 30; 20 MG/100ML; MG/100ML; MG/100ML; MG/100ML
125 INJECTION, SOLUTION INTRAVENOUS CONTINUOUS
Status: DISCONTINUED | OUTPATIENT
Start: 2018-06-07 | End: 2018-06-08

## 2018-06-07 RX ORDER — LIDOCAINE HYDROCHLORIDE AND EPINEPHRINE 20; 5 MG/ML; UG/ML
INJECTION, SOLUTION EPIDURAL; INFILTRATION; INTRACAUDAL; PERINEURAL AS NEEDED
Status: DISCONTINUED | OUTPATIENT
Start: 2018-06-07 | End: 2018-06-08 | Stop reason: SURG

## 2018-06-07 RX ORDER — BUTORPHANOL TARTRATE 1 MG/ML
1 INJECTION, SOLUTION INTRAMUSCULAR; INTRAVENOUS ONCE
Status: COMPLETED | OUTPATIENT
Start: 2018-06-07 | End: 2018-06-07

## 2018-06-07 RX ORDER — ONDANSETRON 2 MG/ML
4 INJECTION INTRAMUSCULAR; INTRAVENOUS EVERY 6 HOURS PRN
Status: DISCONTINUED | OUTPATIENT
Start: 2018-06-07 | End: 2018-06-13 | Stop reason: HOSPADM

## 2018-06-07 RX ADMIN — SODIUM CHLORIDE, SODIUM LACTATE, POTASSIUM CHLORIDE, AND CALCIUM CHLORIDE 125 ML/HR: .6; .31; .03; .02 INJECTION, SOLUTION INTRAVENOUS at 15:45

## 2018-06-07 RX ADMIN — ANTIHEMOPHILIC FACTOR/VON WILLEBRAND FACTOR COMPLEX (HUMAN) 3962 UNITS: KIT at 18:15

## 2018-06-07 RX ADMIN — SODIUM CHLORIDE, SODIUM LACTATE, POTASSIUM CHLORIDE, AND CALCIUM CHLORIDE 999 ML/HR: .6; .31; .03; .02 INJECTION, SOLUTION INTRAVENOUS at 23:30

## 2018-06-07 RX ADMIN — SODIUM CHLORIDE 5 MILLION UNITS: 0.9 INJECTION, SOLUTION INTRAVENOUS at 15:45

## 2018-06-07 RX ADMIN — SODIUM CHLORIDE 2.5 MILLION UNITS: 9 INJECTION, SOLUTION INTRAVENOUS at 23:55

## 2018-06-07 RX ADMIN — ROPIVACAINE HYDROCHLORIDE: 2 INJECTION, SOLUTION EPIDURAL; INFILTRATION at 20:15

## 2018-06-07 RX ADMIN — SODIUM CHLORIDE 2.5 MILLION UNITS: 9 INJECTION, SOLUTION INTRAVENOUS at 20:05

## 2018-06-07 RX ADMIN — LIDOCAINE HYDROCHLORIDE AND EPINEPHRINE 5 ML: 20; 5 INJECTION, SOLUTION EPIDURAL; INFILTRATION; INTRACAUDAL; PERINEURAL at 19:03

## 2018-06-07 RX ADMIN — SODIUM CHLORIDE, SODIUM LACTATE, POTASSIUM CHLORIDE, AND CALCIUM CHLORIDE 125 ML/HR: .6; .31; .03; .02 INJECTION, SOLUTION INTRAVENOUS at 18:52

## 2018-06-07 RX ADMIN — BUTORPHANOL TARTRATE 1 MG: 1 INJECTION, SOLUTION INTRAMUSCULAR; INTRAVENOUS at 16:21

## 2018-06-07 NOTE — TELEPHONE ENCOUNTER
Spoke to Nikky resident  Patient presented to L&D today in active labor  Due to low factor VIII antigen, history of Von Willebrand we recommend:    Humate-P 50 unit/kg x 1 dose, then 30 unit/kg every 12 hours x 4 doses  Patient may have epidural 30 min following administration of 50 unit/kg dose  We do not recommend DDAVP  Dr Brito Never established plan  His cell phone number was provided to the resident if questions should arise during her delivery/post delivery  Anaesthesia is welcome to contact Dr Brito Never with any further questions as well

## 2018-06-07 NOTE — ANESTHESIA PREPROCEDURE EVALUATION
Review of Systems/Medical History          Cardiovascular   Pulmonary       GI/Hepatic            Endo/Other    Obesity (BMI 36)  morbid obesity   GYN  Currently pregnant ,          Hematology      Comment: Juan Longo, hematologist Musculoskeletal  Negative musculoskeletal ROS        Neurology    Headaches,    Psychology   Negative psychology ROS              Physical Exam    Airway    Mallampati score: III  TM Distance: >3 FB  Neck ROM: full     Dental   No notable dental hx     Cardiovascular      Pulmonary      Other Findings        Anesthesia Plan  ASA Score- 3     Anesthesia Type- epidural with ASA Monitors  Additional Monitors:   Airway Plan:     Comment: OBGYN team talked to Dr Flip Longo over the phone: recommend use of antihemophilic factor [id number] before placement of epidural  PTT was within normal limits, plts > 200k  Pt denies recent nosebleeds, bleeding gums, or brusing  Plan Factors-    Induction-     Postoperative Plan-     Informed Consent- Anesthetic plan and risks discussed with patient

## 2018-06-08 LAB
BASE EXCESS BLDCOA CALC-SCNC: -6.9 MMOL/L (ref 3–11)
BASE EXCESS BLDCOV CALC-SCNC: -4.8 MMOL/L (ref 1–9)
HCO3 BLDCOA-SCNC: 20.1 MMOL/L (ref 17.3–27.3)
HCO3 BLDCOV-SCNC: 20.3 MMOL/L (ref 12.2–28.6)
O2 CT VFR BLDCOA CALC: 11.6 ML/DL
OXYHGB MFR BLDCOA: 50.3 %
OXYHGB MFR BLDCOV: 54.9 %
PCO2 BLDCOA: 45.3 MM[HG] (ref 30–60)
PCO2 BLDCOV: 38 MM HG (ref 27–43)
PH BLDCOA: 7.26 [PH] (ref 7.23–7.43)
PH BLDCOV: 7.34 [PH] (ref 7.19–7.49)
PO2 BLDCOA: 22.5 MM HG (ref 5–25)
PO2 BLDCOV: 22.3 MM HG (ref 15–45)
RPR SER QL: NORMAL
SAO2 % BLDCOV: 12.7 ML/DL

## 2018-06-08 PROCEDURE — 85245 CLOT FACTOR VIII VW RISTOCTN: CPT | Performed by: INTERNAL MEDICINE

## 2018-06-08 PROCEDURE — 59510 CESAREAN DELIVERY: CPT | Performed by: OBSTETRICS & GYNECOLOGY

## 2018-06-08 PROCEDURE — 82805 BLOOD GASES W/O2 SATURATION: CPT | Performed by: OBSTETRICS & GYNECOLOGY

## 2018-06-08 PROCEDURE — 10H073Z INSERTION OF MONITORING ELECTRODE INTO PRODUCTS OF CONCEPTION, VIA NATURAL OR ARTIFICIAL OPENING: ICD-10-PCS | Performed by: OBSTETRICS & GYNECOLOGY

## 2018-06-08 PROCEDURE — 4A1H7CZ MONITORING OF PRODUCTS OF CONCEPTION, CARDIAC RATE, VIA NATURAL OR ARTIFICIAL OPENING: ICD-10-PCS | Performed by: OBSTETRICS & GYNECOLOGY

## 2018-06-08 PROCEDURE — 85240 CLOT FACTOR VIII AHG 1 STAGE: CPT | Performed by: INTERNAL MEDICINE

## 2018-06-08 PROCEDURE — 85246 CLOT FACTOR VIII VW ANTIGEN: CPT | Performed by: INTERNAL MEDICINE

## 2018-06-08 RX ORDER — CALCIUM CARBONATE 200(500)MG
1000 TABLET,CHEWABLE ORAL DAILY PRN
Status: DISCONTINUED | OUTPATIENT
Start: 2018-06-08 | End: 2018-06-13 | Stop reason: HOSPADM

## 2018-06-08 RX ORDER — MIDAZOLAM HYDROCHLORIDE 1 MG/ML
INJECTION INTRAMUSCULAR; INTRAVENOUS AS NEEDED
Status: DISCONTINUED | OUTPATIENT
Start: 2018-06-08 | End: 2018-06-08 | Stop reason: SURG

## 2018-06-08 RX ORDER — FENTANYL CITRATE/PF 50 MCG/ML
25 SYRINGE (ML) INJECTION
Status: DISCONTINUED | OUTPATIENT
Start: 2018-06-08 | End: 2018-06-11

## 2018-06-08 RX ORDER — OXYTOCIN/RINGER'S LACTATE 30/500 ML
PLASTIC BAG, INJECTION (ML) INTRAVENOUS
Status: DISPENSED
Start: 2018-06-08 | End: 2018-06-08

## 2018-06-08 RX ORDER — NALOXONE HYDROCHLORIDE 0.4 MG/ML
0.1 INJECTION, SOLUTION INTRAMUSCULAR; INTRAVENOUS; SUBCUTANEOUS
Status: ACTIVE | OUTPATIENT
Start: 2018-06-08 | End: 2018-06-09

## 2018-06-08 RX ORDER — ACETAMINOPHEN 325 MG/1
650 TABLET ORAL EVERY 6 HOURS PRN
Status: DISCONTINUED | OUTPATIENT
Start: 2018-06-08 | End: 2018-06-08

## 2018-06-08 RX ORDER — DIAPER,BRIEF,INFANT-TODD,DISP
1 EACH MISCELLANEOUS DAILY PRN
Status: DISCONTINUED | OUTPATIENT
Start: 2018-06-08 | End: 2018-06-13 | Stop reason: HOSPADM

## 2018-06-08 RX ORDER — OXYCODONE HYDROCHLORIDE AND ACETAMINOPHEN 5; 325 MG/1; MG/1
1 TABLET ORAL EVERY 4 HOURS PRN
Status: DISPENSED | OUTPATIENT
Start: 2018-06-08 | End: 2018-06-09

## 2018-06-08 RX ORDER — PROPOFOL 10 MG/ML
INJECTION, EMULSION INTRAVENOUS CONTINUOUS PRN
Status: DISCONTINUED | OUTPATIENT
Start: 2018-06-08 | End: 2018-06-08 | Stop reason: SURG

## 2018-06-08 RX ORDER — IBUPROFEN 600 MG/1
600 TABLET ORAL EVERY 6 HOURS PRN
Status: DISCONTINUED | OUTPATIENT
Start: 2018-06-08 | End: 2018-06-13 | Stop reason: HOSPADM

## 2018-06-08 RX ORDER — SODIUM CHLORIDE, SODIUM LACTATE, POTASSIUM CHLORIDE, CALCIUM CHLORIDE 600; 310; 30; 20 MG/100ML; MG/100ML; MG/100ML; MG/100ML
INJECTION, SOLUTION INTRAVENOUS CONTINUOUS PRN
Status: DISCONTINUED | OUTPATIENT
Start: 2018-06-08 | End: 2018-06-08 | Stop reason: SURG

## 2018-06-08 RX ORDER — METOCLOPRAMIDE HYDROCHLORIDE 5 MG/ML
5 INJECTION INTRAMUSCULAR; INTRAVENOUS EVERY 6 HOURS PRN
Status: ACTIVE | OUTPATIENT
Start: 2018-06-08 | End: 2018-06-09

## 2018-06-08 RX ORDER — DIPHENHYDRAMINE HYDROCHLORIDE 50 MG/ML
25 INJECTION INTRAMUSCULAR; INTRAVENOUS EVERY 6 HOURS PRN
Status: DISCONTINUED | OUTPATIENT
Start: 2018-06-08 | End: 2018-06-13 | Stop reason: HOSPADM

## 2018-06-08 RX ORDER — SODIUM CHLORIDE, SODIUM LACTATE, POTASSIUM CHLORIDE, CALCIUM CHLORIDE 600; 310; 30; 20 MG/100ML; MG/100ML; MG/100ML; MG/100ML
125 INJECTION, SOLUTION INTRAVENOUS CONTINUOUS
Status: DISCONTINUED | OUTPATIENT
Start: 2018-06-08 | End: 2018-06-13 | Stop reason: HOSPADM

## 2018-06-08 RX ORDER — ACETAMINOPHEN 325 MG/1
650 TABLET ORAL EVERY 6 HOURS PRN
Status: DISCONTINUED | OUTPATIENT
Start: 2018-06-08 | End: 2018-06-13 | Stop reason: HOSPADM

## 2018-06-08 RX ORDER — ONDANSETRON 2 MG/ML
INJECTION INTRAMUSCULAR; INTRAVENOUS AS NEEDED
Status: DISCONTINUED | OUTPATIENT
Start: 2018-06-08 | End: 2018-06-08 | Stop reason: SURG

## 2018-06-08 RX ORDER — OXYTOCIN/RINGER'S LACTATE 30/500 ML
62.5 PLASTIC BAG, INJECTION (ML) INTRAVENOUS CONTINUOUS
Status: ACTIVE | OUTPATIENT
Start: 2018-06-08 | End: 2018-06-08

## 2018-06-08 RX ORDER — ONDANSETRON 2 MG/ML
4 INJECTION INTRAMUSCULAR; INTRAVENOUS EVERY 4 HOURS PRN
Status: ACTIVE | OUTPATIENT
Start: 2018-06-08 | End: 2018-06-09

## 2018-06-08 RX ORDER — CEFAZOLIN SODIUM 1 G/3ML
INJECTION, POWDER, FOR SOLUTION INTRAMUSCULAR; INTRAVENOUS AS NEEDED
Status: DISCONTINUED | OUTPATIENT
Start: 2018-06-08 | End: 2018-06-08 | Stop reason: SURG

## 2018-06-08 RX ORDER — MORPHINE SULFATE 0.5 MG/ML
INJECTION, SOLUTION EPIDURAL; INTRATHECAL; INTRAVENOUS AS NEEDED
Status: DISCONTINUED | OUTPATIENT
Start: 2018-06-08 | End: 2018-06-08 | Stop reason: SURG

## 2018-06-08 RX ORDER — OXYTOCIN/RINGER'S LACTATE 30/500 ML
PLASTIC BAG, INJECTION (ML) INTRAVENOUS
Status: COMPLETED
Start: 2018-06-08 | End: 2018-06-08

## 2018-06-08 RX ORDER — MORPHINE SULFATE 1 MG/ML
INJECTION, SOLUTION EPIDURAL; INTRATHECAL; INTRAVENOUS AS NEEDED
Status: DISCONTINUED | OUTPATIENT
Start: 2018-06-08 | End: 2018-06-08

## 2018-06-08 RX ORDER — FENTANYL CITRATE/PF 50 MCG/ML
25 SYRINGE (ML) INJECTION
Status: DISCONTINUED | OUTPATIENT
Start: 2018-06-08 | End: 2018-06-08

## 2018-06-08 RX ORDER — OXYCODONE HYDROCHLORIDE AND ACETAMINOPHEN 5; 325 MG/1; MG/1
1 TABLET ORAL EVERY 4 HOURS PRN
Status: DISCONTINUED | OUTPATIENT
Start: 2018-06-09 | End: 2018-06-13 | Stop reason: HOSPADM

## 2018-06-08 RX ORDER — DOCUSATE SODIUM 100 MG/1
100 CAPSULE, LIQUID FILLED ORAL 2 TIMES DAILY
Status: DISCONTINUED | OUTPATIENT
Start: 2018-06-08 | End: 2018-06-13 | Stop reason: HOSPADM

## 2018-06-08 RX ORDER — CLINDAMYCIN PHOSPHATE 900 MG/50ML
900 INJECTION INTRAVENOUS EVERY 8 HOURS
Status: DISCONTINUED | OUTPATIENT
Start: 2018-06-08 | End: 2018-06-09

## 2018-06-08 RX ORDER — OXYCODONE HYDROCHLORIDE AND ACETAMINOPHEN 5; 325 MG/1; MG/1
2 TABLET ORAL EVERY 4 HOURS PRN
Status: DISCONTINUED | OUTPATIENT
Start: 2018-06-09 | End: 2018-06-13 | Stop reason: HOSPADM

## 2018-06-08 RX ADMIN — FENTANYL CITRATE 25 MCG: 50 INJECTION INTRAMUSCULAR; INTRAVENOUS at 08:10

## 2018-06-08 RX ADMIN — FENTANYL CITRATE 25 MCG: 50 INJECTION INTRAMUSCULAR; INTRAVENOUS at 07:37

## 2018-06-08 RX ADMIN — SODIUM CHLORIDE, SODIUM LACTATE, POTASSIUM CHLORIDE, AND CALCIUM CHLORIDE 125 ML/HR: .6; .31; .03; .02 INJECTION, SOLUTION INTRAVENOUS at 07:55

## 2018-06-08 RX ADMIN — PROPOFOL 60 MCG/KG/MIN: 10 INJECTION, EMULSION INTRAVENOUS at 06:18

## 2018-06-08 RX ADMIN — CLINDAMYCIN PHOSPHATE 900 MG: 18 INJECTION, SOLUTION INTRAMUSCULAR; INTRAVENOUS at 12:46

## 2018-06-08 RX ADMIN — CLINDAMYCIN PHOSPHATE 900 MG: 18 INJECTION, SOLUTION INTRAMUSCULAR; INTRAVENOUS at 20:26

## 2018-06-08 RX ADMIN — MORPHINE SULFATE 1 MG: 0.5 INJECTION, SOLUTION EPIDURAL; INTRATHECAL; INTRAVENOUS at 05:59

## 2018-06-08 RX ADMIN — SODIUM CHLORIDE, SODIUM LACTATE, POTASSIUM CHLORIDE, AND CALCIUM CHLORIDE: .6; .31; .03; .02 INJECTION, SOLUTION INTRAVENOUS at 05:19

## 2018-06-08 RX ADMIN — ANTIHEMOPHILIC FACTOR/VON WILLEBRAND FACTOR COMPLEX (HUMAN) 3962 UNITS: KIT at 20:27

## 2018-06-08 RX ADMIN — CEFAZOLIN 2000 MG: 1 INJECTION, POWDER, FOR SOLUTION INTRAVENOUS at 05:19

## 2018-06-08 RX ADMIN — GENTAMICIN SULFATE 240 MG: 40 INJECTION, SOLUTION INTRAMUSCULAR; INTRAVENOUS at 13:53

## 2018-06-08 RX ADMIN — Medication 62.5 MILLI-UNITS/MIN: at 07:55

## 2018-06-08 RX ADMIN — ONDANSETRON 4 MG: 2 INJECTION INTRAMUSCULAR; INTRAVENOUS at 05:36

## 2018-06-08 RX ADMIN — OXYTOCIN 30 MILLI-UNITS: 10 INJECTION, SOLUTION INTRAMUSCULAR; INTRAVENOUS at 05:53

## 2018-06-08 RX ADMIN — MIDAZOLAM 1 MG: 1 INJECTION INTRAMUSCULAR; INTRAVENOUS at 06:01

## 2018-06-08 RX ADMIN — MORPHINE SULFATE 1 MG: 0.5 INJECTION, SOLUTION EPIDURAL; INTRATHECAL; INTRAVENOUS at 06:02

## 2018-06-08 RX ADMIN — DOCUSATE SODIUM 100 MG: 100 CAPSULE, LIQUID FILLED ORAL at 17:32

## 2018-06-08 RX ADMIN — SODIUM CHLORIDE, SODIUM LACTATE, POTASSIUM CHLORIDE, AND CALCIUM CHLORIDE: .6; .31; .03; .02 INJECTION, SOLUTION INTRAVENOUS at 05:18

## 2018-06-08 RX ADMIN — SODIUM CHLORIDE, SODIUM LACTATE, POTASSIUM CHLORIDE, AND CALCIUM CHLORIDE 125 ML/HR: .6; .31; .03; .02 INJECTION, SOLUTION INTRAVENOUS at 03:02

## 2018-06-08 RX ADMIN — FENTANYL CITRATE 25 MCG: 50 INJECTION INTRAMUSCULAR; INTRAVENOUS at 07:56

## 2018-06-08 RX ADMIN — MORPHINE SULFATE 2 MG: 0.5 INJECTION, SOLUTION EPIDURAL; INTRATHECAL; INTRAVENOUS at 05:55

## 2018-06-08 RX ADMIN — MORPHINE SULFATE 1 MG: 0.5 INJECTION, SOLUTION EPIDURAL; INTRATHECAL; INTRAVENOUS at 06:01

## 2018-06-08 RX ADMIN — SODIUM CHLORIDE, SODIUM LACTATE, POTASSIUM CHLORIDE, AND CALCIUM CHLORIDE 125 ML/HR: .6; .31; .03; .02 INJECTION, SOLUTION INTRAVENOUS at 01:22

## 2018-06-08 RX ADMIN — MIDAZOLAM 1 MG: 1 INJECTION INTRAMUSCULAR; INTRAVENOUS at 06:00

## 2018-06-08 RX ADMIN — OXYCODONE HYDROCHLORIDE AND ACETAMINOPHEN 1 TABLET: 5; 325 TABLET ORAL at 20:26

## 2018-06-08 RX ADMIN — ANTIHEMOPHILIC FACTOR/VON WILLEBRAND FACTOR COMPLEX (HUMAN) 3962 UNITS: KIT at 04:55

## 2018-06-08 RX ADMIN — ROPIVACAINE HYDROCHLORIDE: 2 INJECTION, SOLUTION EPIDURAL; INFILTRATION at 05:02

## 2018-06-08 RX ADMIN — SODIUM CHLORIDE, SODIUM LACTATE, POTASSIUM CHLORIDE, AND CALCIUM CHLORIDE 125 ML/HR: .6; .31; .03; .02 INJECTION, SOLUTION INTRAVENOUS at 17:32

## 2018-06-08 RX ADMIN — AZITHROMYCIN 500 MG: 500 INJECTION, POWDER, LYOPHILIZED, FOR SOLUTION INTRAVENOUS at 05:29

## 2018-06-08 RX ADMIN — LIDOCAINE HYDROCHLORIDE AND EPINEPHRINE 10 ML: 20; 5 INJECTION, SOLUTION EPIDURAL; INFILTRATION; INTRACAUDAL; PERINEURAL at 05:58

## 2018-06-08 RX ADMIN — LIDOCAINE HYDROCHLORIDE AND EPINEPHRINE 10 ML: 20; 5 INJECTION, SOLUTION EPIDURAL; INFILTRATION; INTRACAUDAL; PERINEURAL at 05:21

## 2018-06-08 RX ADMIN — LIDOCAINE HYDROCHLORIDE AND EPINEPHRINE 10 ML: 20; 5 INJECTION, SOLUTION EPIDURAL; INFILTRATION; INTRACAUDAL; PERINEURAL at 05:16

## 2018-06-08 RX ADMIN — OXYCODONE HYDROCHLORIDE AND ACETAMINOPHEN 1 TABLET: 5; 325 TABLET ORAL at 15:50

## 2018-06-08 RX ADMIN — FENTANYL CITRATE 25 MCG: 50 INJECTION INTRAMUSCULAR; INTRAVENOUS at 08:15

## 2018-06-08 NOTE — PROGRESS NOTES
C section required for arrest of descent as per Dr Trevor Brizuela  Called Dr Alvina Jurado, hematology: patient may receive another dose of antihemophilic factor before going into the OR  This medication was given at 0455  OK to remove epidural when c section completed

## 2018-06-08 NOTE — DISCHARGE INSTRUCTIONS
Section   WHAT YOU SHOULD KNOW:   A  delivery, or , is abdominal surgery to deliver your baby  There are many reasons you may need a   · A  may be scheduled before labor if you had a  with your last baby  It may be scheduled if your baby is not positioned normally, or you are pregnant with more than 1 baby  · Your caregiver may perform an emergency  during labor to prevent life-threatening complications for you or your baby  A  may be done if your cervix does not dilate after several hours of active labor  · Other reasons for a  include maternal infections and problems with the placenta  AFTER YOU LEAVE:   Medicines:   · Prescription pain medicine  may be given  Ask how to take this medicine safely  · Acetaminophen  decreases pain and fever  It is available without a doctor's order  Ask how much to take and how often to take it  Follow directions  Acetaminophen can cause liver damage if not taken correctly  · NSAIDs  help decrease swelling and pain or fever  This medicine is available with or without a doctor's order  NSAIDs can cause stomach bleeding or kidney problems in certain people  If you take blood thinner medicine, always ask your obstetrician if NSAIDs are safe for you  Always read the medicine label and follow directions  · Take your medicine as directed  Contact your obstetrician (OB) if you think your medicine is not helping or if you have side effects  Tell him if you are allergic to any medicine  Keep a list of the medicines, vitamins, and herbs you take  Include the amounts, and when and why you take them  Bring the list or the pill bottles to follow-up visits  Carry your medicine list with you in case of an emergency  Follow up with your OB as directed: You may need to return to have your stitches or staples removed  Write down your questions so you remember to ask them during your visits    Wound care:  Carefully wash your wound with soap and water every day  Keep your wound clean and dry  Wear loose, comfortable clothes that do not rub against your wound  Ask your OB about bathing and showering  Drink plenty of liquids: You can lower your risk for a blood clot if you drink plenty of liquids  Ask how much liquid to drink each day and which liquids are best for you  Limit activity until you have fully recovered from surgery:   · Ask when it is safe for you to drive, walk up stairs, lift heavy objects, and have sex  · Ask when it is okay to exercise, and what types of exercise to do  Start slowly and do more as you get stronger  Contact your OB if:   · You have heavy vaginal bleeding that fills 1 or more sanitary pads in 1 hour  · You have a fever  · Your incision is swollen, red, or draining pus  · You have questions or concerns about yourself or your baby  Seek care immediately or call 911 if:   · Blood soaks through your bandage  · Your stitches come apart  · You feel lightheaded, short of breath, and have chest pain  · You cough up blood  · Your arm or leg feels warm, tender, and painful  It may look swollen and red  © 2014 [address] is for End User's use only and may not be sold, redistributed or otherwise used for commercial purposes  All illustrations and images included in CareNotes® are the copyrighted property of A D A M [employer] Inc  or [employer]  The above information is an  only  It is not intended as medical advice for individual conditions or treatments  Talk to your doctor, nurse or pharmacist before following any medical regimen to see if it is safe and effective for you

## 2018-06-08 NOTE — OB LABOR/OXYTOCIN SAFETY PROGRESS
Labor Progress Note - Jennifer Chávez 21 y o  female MRN: [id number]    Unit/Bed#: -01 Encounter: [id number]    Obstetric History       T0      L0     SAB0   TAB0   Ectopic0   Multiple0   Live Births0      Gestational Age: 43w3d     Contraction Frequency (minutes): 1 5-3 5  Contraction Quality: Moderate  Tachysystole: No   Dilation: 10  Dilation Complete Date: 18  Dilation Complete Time: 0312  Effacement (%): 100  Station: 1  Baseline Rate: 130 bpm  Fetal Heart Rate: 125 BPM  FHR Category: Category II          Notes/comments: Patient is pushing very well                                         Vertex descends with pushing but there is moderate amount of caput present                    There is a very tight fit between actual bony vertex and the pubic arch                  Will have the patient continue to push for a little while                      Explained that there is a high probability of  delivery in light of the tight fit at the pubic arch level                      Patient understands            Mehreen Peterson MD 2018 3:59 AM

## 2018-06-08 NOTE — ANESTHESIA PROCEDURE NOTES
Epidural Block    Patient location during procedure: OB  Start time: 6/7/2018 6:47 PM  Reason for block: procedure for pain, at surgeon's request and primary anesthetic  Staffing  Anesthesiologist: CAREY George  Performed: anesthesiologist   Preanesthetic Checklist  Completed: patient identified, site marked, surgical consent, pre-op evaluation, timeout performed, IV checked, risks and benefits discussed and monitors and equipment checked  Epidural  Patient position: sitting  Prep: Betadine  Patient monitoring: heart rate, cardiac monitor, continuous pulse ox and frequent blood pressure checks  Approach: midline  Location: lumbar (1-5)  Injection technique: RABIA air  Needle  Needle type: Tuohy   Needle gauge: 18 G  Test dose: negative and lidocaine 2%  Assessment  Sensory level: [id number] aspiration for CSF, negative aspiration for heme and no paresthesia on injection  patient tolerated the procedure well with no immediate complications  Additional Notes  One attempt, L3-4, RABIA at  Cm, taped to skin at 12 cm

## 2018-06-08 NOTE — OB LABOR/OXYTOCIN SAFETY PROGRESS
Labor Progress Note - Ryley Sharma 21 y o  female MRN: [id number]    Unit/Bed#: -01 Encounter: [id number]    Obstetric History       T0      L0     SAB0   TAB0   Ectopic0   Multiple0   Live Births0      Gestational Age: 43w3d     Contraction Frequency (minutes): 1 5-3 5  Contraction Quality: Moderate  Tachysystole: No   Dilation: 10  Dilation Complete Date: 18  Dilation Complete Time: 031  Effacement (%): 100  Station: 1  Baseline Rate: 130 bpm  Fetal Heart Rate: 125 BPM  FHR Category: Category II          Notes/comments: Patient rechecked at [phone]                                          No further progress had been achieved                                            Still a fair amount of Caput present                                                 Vertex remains wedged in a very tight pubic arch                                                    Explained the need for  Section                                                         Patient and father understand and consent                                                            All questions were answered                       Patient will receive another dose of Factor VIII pre op after discussion with anesthesia            Gill Weber MD 2018 4:42 AM

## 2018-06-08 NOTE — CONSULTS
Patient: Ryley Sharma  Patient MRN: [id number]  Service date: 2018  Attending Physician:       CHIEF COMPLAIN  Chief Complaint   Patient presents with    Contractions     started @ 56       Heme / Oncology history:  Ryley Sharma is a 21 y o  female     Marie Kylie disease  -presented withpost tonsillectomy hematoma at age 13     -diagnosed in [address] [place] [address] to [place], following Dr Catherine Valdez  HISTORY OF PRESENT ILLNESS:  Ryley Sharma is a 21 y o  female who has history of 1 Willebrand's disease, as above, presented with post tonsillectomy hematoma at age 13, following hematologist in [facility] for short time, then lost follow-up  Admitted for scheduled delivery, vaginal delivery converted to   Status post  this a m , hematology was consulted for comanagement  Patient confirmed above history, reported as baseline no major bleeding issues, has heavy measures  , questionable prolonged bleeding after wound  He had a laparoscopic cholecystectomy last year in [facility], do not recall seeing hematologist or receiving any special treatment there, had a uneventful postop course discharged on time  Reported did well after , no objective bleeding  Currently no disease lightheadedness chest pain palpitations  Feeling tired, had a healthy boy 8 9 pounds  PROBLEM LIST:  Patient Active Problem List   Diagnosis    Von Willebrand disease ([facility] [address] )    Positive GBS test    39 weeks gestation of pregnancy       ASSESSMENT/PLAN:  Ryley Sharma is a 21 y o  female with:    von Willebrand disease    - Von Willebrand antigen, activity range from 77 to 154 percent in April and May   these numbers   Could be elevated due to pregnancy, not reflecting her baseline status;  factor 8 antigen, activity 30-38 percent ;  von Willebrand's factor multimernormal distribution   --> these 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, essentially due to mutation affecting binding of 1 Willebrand's factor and factor 8, causing increased clearance of factor 8, clinically presentation is similar to hemophilia a, managed as hemophilia A     - patient is currently on humate P, which has both Von Willebrand factor and factor 8  We will need to chec factor 8 and Von Willebrand's factor level and activity on a daily basis, goal 4 both parameters will be from  percent  We will give humate P if less than 50 percent ,   hold if more than 150 percent  -if we von Willebrand factor level > 150 %,   factor 8 level < 50%, we can switch to factor 8 infusion   -patient's bleeding risk will be high from postop day 3-4 weeks  During this time patient will need to be closely monitored  While in the hospital, patient could be monitored , hematology will closely monitor patient with daily factor level, treatment will be based on levels  Continue current humate P                 60  minutes were spent face to face with patient with greater than 50% of the time spent in counseling or coordination of care including discussions of treatment instructions  All of the patient's questions were answered to their satisfactory during this discussion   Osito Lyons MD PhD  Hematology / Oncology                              PAST MEDICAL HISTORY:   has a past medical history of Migraine; Placenta previa antepartum in first trimester; Varicella; and Delta Banter disease ([facility] [place] [address] )  PAST SURGICAL HISTORY:   has a past surgical history that includes Tonsillectomy; Gallbladder surgery; and Cholecystectomy      CURRENT MEDICATIONS  Scheduled Meds:  Current Facility-Administered Medications:  acetaminophen 650 mg Oral Q6H PRN Bert Little MD    acetaminophen 650 mg Oral Q6H PRN Alexus Scotland ON 7/6/9165] Antihemophilic Factor-VWF 2,924 Units Intravenous Once Vanna Holly MD    [START ON 5/3/3863] Antihemophilic Factor-VWF 9,542 Units Intravenous Once Vanna Holly MD    Antihemophilic Factor-VWF 5,938 Units Intravenous Once Eyal Vaughan MD    benzocaine-menthol-lanolin-aloe 1 application Topical L4T PRN Regenia Hernandez    calcium carbonate 1,000 mg Oral Daily PRN Regenia Hernandez    cefazolin 2,000 mg Intravenous Q8H Jenniffer Walton MD    clindamycin 900 mg Intravenous Q8H Mary Larson MD Last Rate: 900 mg (06/08/18 1246)   diphenhydrAMINE 25 mg Intravenous Q6H PRN Regenia Hernandez    docusate sodium 100 mg Oral BID Regenia Hernandez    fentaNYL 25 mcg Intravenous Q5 Min PRN Veronica Wilhelm MD    gentamicin 5 mg/kg (Ideal) Intravenous Q24H Mary Larson MD    hydrocortisone 1 application Topical Daily PRN Regenia Hernandez    HYDROmorphone 0 5 mg Intravenous Q5 Min PRN Veronica Wilhelm MD    ibuprofen 600 mg Oral Q6H PRN Regenia Hernandez    lactated ringers 125 mL/hr Intravenous Continuous Regenia Hernandez Last Rate: 125 mL/hr (06/08/18 0755)   metoclopramide 5 mg Intravenous Q6H PRN Veronica Wilhelm MD    naloxone 0 1 mg Intravenous Q3 min PRN Veronica Wilhelm MD    ondansetron 4 mg Intravenous Q6H PRN Mary Larson MD    ondansetron 4 mg Intravenous Q4H PRN Veronica Wilhelm MD    oxyCODONE-acetaminophen 1 tablet Oral Q4H PRN Veronica Wilhelm MD    [START ON 6/9/2018] oxyCODONE-acetaminophen 1 tablet Oral Q4H PRN Regenia Hernandez    [START ON 6/9/2018] oxyCODONE-acetaminophen 2 tablet Oral Q4H PRN Regenia Hernandez    oxytocin        oxytocin 62 5 francesca-units/min Intravenous Continuous Regenia Hernandez Last Rate: 62 5 francesca-units/min (06/08/18 0755)   witch hazel-glycerin 1 pad Topical Q4H PRN Regenia Hernandez      Continuous Infusions:  lactated ringers 125 mL/hr Last Rate: 125 mL/hr (06/08/18 0755)   oxytocin 62 5 francesca-units/min Last Rate: 62 5 francesca-units/min (06/08/18 0755)     PRN Meds:   acetaminophen    acetaminophen    benzocaine-menthol-lanolin-aloe    calcium carbonate   diphenhydrAMINE    fentaNYL    hydrocortisone    HYDROmorphone    ibuprofen    metoclopramide    naloxone    ondansetron    ondansetron    oxyCODONE-acetaminophen    [START ON 6/9/2018] oxyCODONE-acetaminophen    [START ON 6/9/2018] oxyCODONE-acetaminophen    witch hazel-glycerin    SOCIAL HISTORY:   reports that she has never smoked  She has never used smokeless tobacco  She reports that she does not drink alcohol or use drugs  FAMILY HISTORY:  family history includes Diabetes in her paternal grandfather and paternal grandmother; Hypertension in her paternal grandmother; No Known Problems in her brother, father, mother, and sister  ALLERGIES:  is allergic to aspirin  REVIEW OF SYSTEMS:  Please note that a 14-point review of systems was performed to include Constitutional, HEENT, Respiratory, CVS, GI, , Musculoskeletal, Integumentary, Neurologic, Rheumatologic, Endocrinologic, Psychiatric, Lymphatic, and Hematologic/Oncologic systems were reviewed and are negative unless otherwise stated in HPI  Positive and negative findings pertinent to this evaluation are incorporated into the history of present illness  PHYSICAL EXAMINATION:  Vital Signs: Temp:  [97 4 °F (36 3 °C)-100 8 °F (38 2 °C)] 100 5 °F (38 1 °C)  HR:  [] 112  Resp:  [15-20] 20  BP: ()/(48-89) 107/48  Body mass index is 40 19 kg/m²  Body surface area is 1 84 meters squared  Constitutional: Alert and oriented  HEENT: Anicteric, PERRLA  Chest: Decreased breathing sound bilaterally, No wheezes/rales/rhonchi  CVS: Regular rhythm  Normal rate  Abdomen: Soft, nontender, nondistended  No palpable organomegaly  Extremities: No cyanosis/clubbing/edema  Integumentary: No obvious rashes or bruises  Musculoskeletal: No obvious bony or joint deformities  Psychiatric: Appropriate affect and mood    Lymph Node Survey: No palpable preauricular, submandibular, cervical, supraclavicular, axillary, epitrochlear or inguinal lymphadenopathy      LABS:    Results from last 7 days  Lab Units 06/07/18  1607   WBC Thousand/uL 12 05*   HEMATOCRIT % 41 9   PLATELETS Thousands/uL 208   NEUTROS PCT % 75   MONOS PCT % 6           Invalid input(s): CREA        Invalid input(s): CA, TPRO    Results from last 7 days  Lab Units 06/07/18  1731   INR  1 01   PTT seconds 38*           Invalid input(s): TNI,  PCT              IMAGING:  No orders to display

## 2018-06-08 NOTE — PROGRESS NOTES
Post-op Note - OB/GYN   Ryley Sharma 21 y o  female MRN: [id number]  Unit/Bed#: -01 Encounter: [id number]    Assessment:  21 y o  POD #0 status post primary low transverse Caesarean section for arrest of descent  Suspicion for endometritis with elevated temperatures, prolonged labor course and reported extremity no manipulation of uterus  [place] Section Dx    Plan:  Pain control p r n  Following a m  CBC  Will begin clindamycin 900 mg IV q 8 hours and gentamicin 5 mg/kg IV Q 24 hours  [facility] hematology consulted for management of von Willebrand's disease  Humate 30u/kg Q 12 hours for a total of 4 doses ordered  FEN:  IVF, regular diet as tolerated  DVT Ppx:  SCDs and ambulation      Subjective:     Pain:  Minimal abdominal incisional pain  Tolerating PO: yes  Voiding: Cortez in place  Flatus: yes  BM: no  Ambulating: no  Chest pain: no  Shortness of breath: no  Leg pain: no    Patient denies any subjective fevers or chills  Objective:     Vitals: Blood pressure (!) 107/48, pulse (!) 112, temperature 100 5 °F (38 1 °C), temperature source Oral, resp  rate 20, height 4' 11" (1 499 m), weight 90 3 kg (199 lb), last menstrual period 06/08/2017, SpO2 99 %, currently breastfeeding  Physical Exam:     Physical Exam   General:  Appears common in no acute distress  Cardio:  S1-S2, regular rhythm, rapid rate  Resp: CTABL  Abdomen:  Dressing clean/dry/intact, abdomen soft, appropriately tender with uterus firm 1 cm below umbilicus  G/U:  Minimal lochia noted on pad, catheter in place with 400 cc clear yellow urine      Lab, Imaging and other studies: I have personally reviewed pertinent reports        Lab Results   Component Value Date    WBC 12 05 (H) 06/07/2018    HGB 13 4 06/07/2018    HCT 41 9 06/07/2018    MCV 85 06/07/2018     06/07/2018     Dottie Fisher MD  06/08/18

## 2018-06-08 NOTE — PLAN OF CARE
Knowledge Deficit     Verbalizes understanding of labor plan Completed        Labor & Delivery     Manages discomfort Completed     Patient vital signs are stable Completed          DISCHARGE PLANNING     Discharge to home or other facility with appropriate resources Progressing        PAIN - ADULT     Verbalizes/displays adequate comfort level or baseline comfort level Progressing        POSTPARTUM     Experiences normal postpartum course Progressing     Appropriate maternal -  bonding Progressing     Establishment of infant feeding pattern Progressing     Incision(s), wounds(s) or drain site(s) healing without S/S of infection Progressing        SAFETY ADULT     Patient will remain free of falls Progressing     Maintain or return to baseline ADL function Progressing     Maintain or return mobility status to optimal level Progressing

## 2018-06-08 NOTE — PLAN OF CARE
DISCHARGE PLANNING     Discharge to home or other facility with appropriate resources Progressing        Knowledge Deficit     Verbalizes understanding of labor plan Progressing        Labor & Delivery     Manages discomfort Progressing     Patient vital signs are stable Progressing        PAIN - ADULT     Verbalizes/displays adequate comfort level or baseline comfort level Progressing        SAFETY ADULT     Patient will remain free of falls Progressing     Maintain or return to baseline ADL function Progressing     Maintain or return mobility status to optimal level Progressing

## 2018-06-08 NOTE — ANESTHESIA POSTPROCEDURE EVALUATION
Post-Op Assessment Note      CV Status:  Stable    Mental Status:  Alert and awake    Hydration Status:  Stable    PONV Controlled:  None    Airway Patency:  Patent    Post Op Vitals Reviewed: Yes          Staff: Anesthesiologist     Post-op block assessment: site cleaned, catheter intact and no complications        BP      Temp      Pulse     Resp      SpO2

## 2018-06-08 NOTE — DISCHARGE SUMMARY
Discharge Summary - Pierre Melendez 21 y o  female MRN: [id number]    Unit/Bed#: LD OR 2 Encounter: [id number]    Admission Date: 2018     Discharge Date: 2018    Admitting Diagnosis:  Intrauterine pregnancy at 39 3 weeks, GBS positive, von Willebrand's disease, obesity    Discharge Diagnosis:  Same    Procedures: primary  section, low transverse incision    Attending: Fran Taylor MD    Hospital Course:     Pierre Melendez is a 21 y o   at 39w4d wks who was initially admitted for active labor  The patient made change up to complete dilation but had arrest of descent for which a primary lower transverse  was performed  She delivered a viable male  on 2018 at [id number]  Weight 8lbs 8 9oz via primary  section, low transverse incision  Apgars were 9 (1 min) and 9 (5 min)   was transferred to  nursery and subsequently transferred to NICU for evaluation of a subgaleal hemorrhage  Patient tolerated the procedure well and was transferred to recovery in stable condition  For her von Willebrands disease, she was given humate 50U/kg 30 minutes prior to the epidural and then given 30U/kg every 12 hours for 4 doses after delivery  She was followed by hematology throughout her admission  Her post-operative course was complicated by endometritis for which she received clindamycin and gentamycin  Preoperative hemaglobin was 13 4, postoperative was 8 7 to 8 4 to 8 5 to 9 0g/dL  She did not require transfusion  Her postoperative pain was well controlled with oral analgesics  On postoperative day 3, she was evaluated for deep vein thrombosis due to positive Delta's sign on her left lower extremity and complaint of bilateral lower extremity tenderness  Venous dopplers were negative and the tenderness resolved within 24 hours  On day of discharge, she was ambulating and able to reasonably perform all ADLs  She was voiding and had appropriate bowel function   Pain was well controlled  She was discharged home on post-operative day #4 without complications  Patient was instructed to follow up with her OB as an outpatient and was given appropriate warnings to call provider if she develops signs of infection or uncontrolled pain  Condition at discharge: good     Disposition: Home    Discharge instructions/Information to patient and family:   - Do not place anything (no partner, tampons or douche) in your vagina for 6 weeks  -You may walk for exercise for the first 6 weeks then gradually return to your usual activities    -Please do not drive for 1 week if you have no stitches and for 2 weeks if you have stitches or underwent a  delivery     -You may take baths or shower per your preference    -Please look at your bust (breasts) in the mirror daily and call for redness or tenderness or increased warmth    -Please call us for temperature > 100 4*F or 38* C, worsening pain or a foul discharge       Discharge Medications:   Prenatal vitamin daily for 6 months or the duration of nursing whichever is longer  Motrin 600 mg orally every 6 hours as needed for pain  Tylenol (over the counter) per bottle directions as needed for pain: do NOT use with percocet  Hydrocortisone cream 1% (over the counter) applied 1-2x daily to hemorrhoids as needed    Provisions for Follow-Up Care: Follow up with your doctor in 1 week for incision check       Planned Readmission: No    Daisy Kent MD  2018  6:26 AM

## 2018-06-08 NOTE — OB LABOR/OXYTOCIN SAFETY PROGRESS
Oxytocin Safety Progress Check Note - Dirk Meter 21 y o  female MRN: [id number]    Unit/Bed#: -01 Encounter: [id number]    Obstetric History       T0      L0     SAB0   TAB0   Ectopic0   Multiple0   Live Births0      Gestational Age: 38w3d     Contraction Frequency (minutes): 2  Contraction Quality: Mild  Tachysystole: No   Dilation: 6        Effacement (%): 100  Station: 0  Baseline Rate: 130 bpm  Fetal Heart Rate: 125 BPM  FHR Category: Category I          Notes/comments:      AROM for bloody tinged fluid, Cat 1 tracing, will continue current management   D/W Dr Fer Warren 2018 10:44 PM

## 2018-06-08 NOTE — OB LABOR/OXYTOCIN SAFETY PROGRESS
Labor Progress Note - Donta Higginbotham 21 y o  female MRN: [id number]    Unit/Bed#: -01 Encounter: [id number]    Obstetric History       T0      L0     SAB0   TAB0   Ectopic0   Multiple0   Live Births0      Gestational Age: 43w3d     Contraction Frequency (minutes): 2  Contraction Quality: Moderate  Tachysystole: No   Dilation: 9        Effacement (%): 100  Station: 1  Baseline Rate: 140 bpm  Fetal Heart Rate: 125 BPM  FHR Category: Category II          Notes/comments:   Prolonged deceleration x 5-6 minutes down to mikaela of 90s, FSE applied, IVF bolus, maternal repositioning to right and left, facemask O2 applied ; reflexive tachycardic fetal response to 200s which improved and then return to baseline, now with moderate variability, accelerations and no decelerations     SVE 9/100/+1  Continue to closely monitor, anticipate     Dr Aniceto Bingham aware and on the way to the hospital          Doyle Granados MD 2018 2:35 AM

## 2018-06-08 NOTE — OP NOTE
OPERATIVE REPORT  PATIENT NAME: Beryle Laughter    :  1995  MRN: [id number]  Pt Location: [facility] L&D OR ROOM 02    SURGERY DATE: 2018    Surgeon(s) and Role:     * Jose Garcia MD - Primary     * Lashanda Cruz MD - Assisting    Preop Diagnosis:  Pregnancy at 39 weeks 4 days gestation  GBS positive  Von Willebrand's Disease  Arrest of descent, delivered    Post-Op Diagnosis:  Same as above delivered    Procedure(s) (LRB):   SECTION () (N/A)    Specimen(s):  ID Type Source Tests Collected by Time Destination   A :  Cord Blood Cord BLOOD GAS, VENOUS, CORD, BLOOD GAS, ARTERIAL, CORD Jose Garcia MD 2018 9933    B :  Tissue (Placenta on Hold) OB Only Placenta PLACENTA IN STORAGE Jose Garcia MD 2018 9229        Estimated Blood Loss:   600mL    IVF:   1000mL    Drains:  Urethral Catheter Double-lumen; Latex 16 Fr  (Active)   Amt returned on insertion(mL) 325 mL 2018  8:00 PM   Site Assessment Clean;Skin intact 2018  5:24 AM   Collection Container Standard drainage bag 2018  5:24 AM   Securement Method Securing device (Describe) 2018  5:24 AM   Output (mL) 775 mL 2018  1:04 AM   Number of days: 1       Anesthesia Type:   CSE    Operative Indications:  Arrest of descent, delivered, current hospitalization [O62 1]    Operative Findings:  1  Delivery of viable  male on 18 at 0553, weight 8lbs 9oz  2  Apgar scores of 9 at one minute and 9 at five minutes  3  Blood gases: Arterial pH7 264, Base excess -6 9 ; Venous pH7 345, Base excess -4 8  4  Normal appearing placenta with centrally-inserted 3 vessel cord  5  Clear amniotic fluid  6  Grossly normal uterus, tubes, and ovaries    Complications:   None    Procedure and Technique:    Decision was made to proceed with  section due to arrest of descent  Patient was made aware of these findings and the proposed plan   Risks, benefits, possible complications, alternate treatment options, and expected outcomes were discussed with the patient  The patient agreed with the proposed plan and gave informed consent  The patient was taken to the operating room where she was properly identified to the OR staff and attending physician  She had already received epidural anesthesia during her labor course, which was augmented appropriately  Fetal heart tones were appreciated and found to be appropriate  The vagina was prepped with Betadine, a Cortez catheter was already present and SCDs were placed  The abdomen was prepped with Chloraprep and following appropriate drying time, the patient was draped in the usual sterile manner for a Pfannenstiel incision  The patient had received Ancef 2g IV + Azithromycin 500mg IV pre-operatively for prophylaxis  A Time Out was held and the above information confirmed  The patient was identified as Libby Nova and the procedure verified as primary  Delivery  A Pfannenstiel incision was made and carried down through the underlying subcutaneous tissue to the fascia using a scalpel  Rectus fascia was then incised in the midline and extended laterally using Pandya scissors  The superior edge of the fascia was grasped with Kocher clamps, tented upward, and the underlying muscle was bluntly dissected off  The inferior edge was grasped with Kocher clamps and cleared in similar fashion  All anatomic layers were well-demarcated  The rectus muscles were  and the peritoneum was identified and subsequently entered and extended longitudinally with blunt dissection  The vesicouterine peritoneum was identified and a bladder flap was created using Metzenbaum scissors  The bladder blade was inserted  A low transverse uterine incision was made with the scalpel and extended laterally with blunt dissection  The amnion was entered bluntly    Surgeons hand was inserted through the hysterotomy and the fetal head was palpated, elevated, and delivered through the uterine incision with the assistance of fundal pressure  Baby had spontaneous cry with good color and tone  The umbilical cord was clamped and cut  The infant was handed off to the  providers  Arterial and venous cord gases, cord blood, and a segment of umbilical cord were obtained for evaluation and promptly sent to the lab  The placenta delivered spontaneously with uterine fundal massage and was noted to have a centrally inserted 3 vessel cord  This was also sent to the lab for placental pathology  The uterus was exteriorized and a moist lap sponge was used to clear the cavity of clots and products of conception  The uterine incision was closed with a running locked suture of 0 Vicryl  A second layer of the same suture was used to imbricate the first   Good hemostasis was confirmed upon uterine closure  A small hematoma on the right incision was noted which was stable in size, not expanding  Surgicel was applied to the incision  Warmed normal saline solution was used to irrigate the posterior culdesac and the uterus was returned to the abdomen  The paracolic gutters were inspected and cleared of all clots and debris with irrigation and moist lap sponges  The fascia was closed with a running suture of 0 Vicryl  Subcutaneous tissues were closed with 2-0 Plain suture  The skin was closed with 4-0 Monocryl in a subcuticular fashion  Steri strips and sterile dressing were applied and an abdominal binder was then placed  At the conclusion of the procedure, all needle, sponge, and instrument counts were noted to be correct x2  The patient tolerated the procedure well and was transferred to her the recovery room in stable condition  Dr Chaim Lanes was present and participated in all key portions of the case      Patient Disposition:  PACU     SIGNATURE: Jack Luong MD  DATE: 2018  TIME: 7:00 AM

## 2018-06-08 NOTE — OB LABOR/OXYTOCIN SAFETY PROGRESS
Labor Progress Note - Allen De Paz 21 y o  female MRN: [id number]    Unit/Bed#: -01 Encounter: [id number]    Obstetric History       T0      L0     SAB0   TAB0   Ectopic0   Multiple0   Live Births0      Gestational Age: 43w3d     Contraction Frequency (minutes): 1 5-3 5  Contraction Quality: Moderate  Tachysystole: No   Dilation: 10  Dilation Complete Date: 18  Dilation Complete Time: 0312  Effacement (%): 100  Station: 1  Baseline Rate: 130 bpm  Fetal Heart Rate: 125 BPM  FHR Category: Category II          Notes/comments:   Patient complete and pushing, + caput noted, narrow pubic arch noted, minimal descent despite strong pushing  Will continue pushing       Dr Chaim Lanes present          Jack Luong MD 2018 3:57 AM

## 2018-06-08 NOTE — CASE MANAGEMENT
Notification of Maternity Inpatient Admission/Maternity Inpatient Authorization Request  This is a Notification of Maternity Inpatient Admission/Maternity Inpatient Authorization Request to our facility [facility]  Please be advised that this patient is currently in our facility under Inpatient Status  Below you will find the Birth/ Summary, Attending Physician and Facilitys information including NPI# and contact for the Utilization  assigned to the [facility] where the patient is receiving services  Please feel free to contact the Utilization Review Department with any questions  Mothers Information:  Joseph Shearer  MRN: [id number]  YOB: 1995  Admission Date: 2018  2:12 PM  Discharge Date: No discharge date for patient encounter  Disposition: Home/Self Care  Admitting Diagnosis:   O82  DELIVERY  Douglas Information:  Estimated Date of Delivery: 18  Information for the patient's :  [facility] [[id number]]     Douglas Delivery Information:  Sex: male  Delivered 2018 5:53 AM by , Low Transverse; Gestational Age: 43w3d     Measurements:  Weight: 8 lb 8 9 oz (3880 g); Height: 19 5"    APGAR 1 minute 5 minutes 10 minutes   Totals: 9 9      OB History      Para Term  AB Living    1 1 1 0 0 1    SAB TAB Ectopic Multiple Live Births    0 0 0 0 1        Attending Physician:  LURDES Ring    Specialty- Obstetrics and Gynecology  [address] [id number]  [address]  [place], 210 [facility]  Phone 1: [phone]  Fax: [phone]    [facility])  300 [facility], 210 [facility]  [phone]  Tax ID: [id number]  NPI: [id number]    [facility] in the [facility] by Shin Mann for 2017  Network Utilization Review Department  Phone: [phone]; Fax [phone]  ATTENTION: The Network Utilization Review Department is now centralized for our 7 Facilities  Make a note that we have a new phone and fax numbers for our Department  Please call with any questions or concerns to [phone] and carefully follow the prompts so that you are directed to the right person  All voicemails are confidential  Fax any determinations, approvals, denials, and requests for initial or continue stay review clinical to [phone]  Due to HIGH CALL volume, it would be easier if you could please send faxed requests to expedite your requests and in part, help us provide discharge notifications faster

## 2018-06-08 NOTE — OB LABOR/OXYTOCIN SAFETY PROGRESS
Labor Progress Note - Royal Cavazos 21 y o  female MRN: [id number]    Unit/Bed#: -01 Encounter: [id number]    Obstetric History       T0      L0     SAB0   TAB0   Ectopic0   Multiple0   Live Births0      Gestational Age: 43w3d     Contraction Frequency (minutes): 2 with irritability in between  Contraction Quality: Moderate  Tachysystole: No   Dilation: 7-8        Effacement (%): 100  Station: 1  Baseline Rate: 130 bpm  Fetal Heart Rate: 125 BPM  FHR Category: Category I          Notes/comments:   Patient reports increased pressure  SVE 7-8/100/0  Category I FHT  Continue current plan of care, anticipate     Dr James Foster aware            Zaina Rivera MD 2018 1:33 AM

## 2018-06-08 NOTE — OB LABOR/OXYTOCIN SAFETY PROGRESS
Oxytocin Safety Progress Check Note - Alyssa Prather 21 y o  female MRN: [id number]    Unit/Bed#: -01 Encounter: [id number]    Obstetric History       T0      L0     SAB0   TAB0   Ectopic0   Multiple0   Live Births0      Gestational Age: 38w3d     Contraction Frequency (minutes): 2-3  Contraction Quality: Mild  Tachysystole: No   Dilation: 5        Effacement (%): 100  Station: 0  Baseline Rate: 130 bpm  Fetal Heart Rate: 119 BPM  FHR Category: Category I          Notes/comments:       patient comfortable with epidural, making progress  Status post her dose of factor of 8 as directed by Hematology  We will continue to follow the recommendations  Continue current management possible AROM next check once she is adequately treated for GBS positive status    Discussed with Dr Marleen Araujo 2018 8:27 PM

## 2018-06-09 LAB
BASOPHILS # BLD AUTO: 0.02 THOUSANDS/ΜL (ref 0–0.1)
BASOPHILS # BLD AUTO: 0.02 THOUSANDS/ΜL (ref 0–0.1)
BASOPHILS NFR BLD AUTO: 0 % (ref 0–1)
BASOPHILS NFR BLD AUTO: 0 % (ref 0–1)
EOSINOPHIL # BLD AUTO: 0.05 THOUSAND/ΜL (ref 0–0.61)
EOSINOPHIL # BLD AUTO: 0.06 THOUSAND/ΜL (ref 0–0.61)
EOSINOPHIL NFR BLD AUTO: 0 % (ref 0–6)
EOSINOPHIL NFR BLD AUTO: 1 % (ref 0–6)
ERYTHROCYTE [DISTWIDTH] IN BLOOD BY AUTOMATED COUNT: 17 % (ref 11.6–15.1)
ERYTHROCYTE [DISTWIDTH] IN BLOOD BY AUTOMATED COUNT: 17 % (ref 11.6–15.1)
HCT VFR BLD AUTO: 26.8 % (ref 34.8–46.1)
HCT VFR BLD AUTO: 27.3 % (ref 34.8–46.1)
HGB BLD-MCNC: 8.4 G/DL (ref 11.5–15.4)
HGB BLD-MCNC: 8.7 G/DL (ref 11.5–15.4)
IMM GRANULOCYTES # BLD AUTO: 0.07 THOUSAND/UL (ref 0–0.2)
IMM GRANULOCYTES # BLD AUTO: 0.15 THOUSAND/UL (ref 0–0.2)
IMM GRANULOCYTES NFR BLD AUTO: 1 % (ref 0–2)
IMM GRANULOCYTES NFR BLD AUTO: 1 % (ref 0–2)
LYMPHOCYTES # BLD AUTO: 2.01 THOUSANDS/ΜL (ref 0.6–4.47)
LYMPHOCYTES # BLD AUTO: 2.21 THOUSANDS/ΜL (ref 0.6–4.47)
LYMPHOCYTES NFR BLD AUTO: 15 % (ref 14–44)
LYMPHOCYTES NFR BLD AUTO: 17 % (ref 14–44)
MCH RBC QN AUTO: 27.3 PG (ref 26.8–34.3)
MCH RBC QN AUTO: 27.4 PG (ref 26.8–34.3)
MCHC RBC AUTO-ENTMCNC: 31.3 G/DL (ref 31.4–37.4)
MCHC RBC AUTO-ENTMCNC: 31.9 G/DL (ref 31.4–37.4)
MCV RBC AUTO: 86 FL (ref 82–98)
MCV RBC AUTO: 87 FL (ref 82–98)
MONOCYTES # BLD AUTO: 0.8 THOUSAND/ΜL (ref 0.17–1.22)
MONOCYTES # BLD AUTO: 0.85 THOUSAND/ΜL (ref 0.17–1.22)
MONOCYTES NFR BLD AUTO: 6 % (ref 4–12)
MONOCYTES NFR BLD AUTO: 6 % (ref 4–12)
NEUTROPHILS # BLD AUTO: 10.68 THOUSANDS/ΜL (ref 1.85–7.62)
NEUTROPHILS # BLD AUTO: 9.77 THOUSANDS/ΜL (ref 1.85–7.62)
NEUTS SEG NFR BLD AUTO: 75 % (ref 43–75)
NEUTS SEG NFR BLD AUTO: 78 % (ref 43–75)
NRBC BLD AUTO-RTO: 0 /100 WBCS
NRBC BLD AUTO-RTO: 0 /100 WBCS
PLATELET # BLD AUTO: 177 THOUSANDS/UL (ref 149–390)
PLATELET # BLD AUTO: 188 THOUSANDS/UL (ref 149–390)
PMV BLD AUTO: 10.3 FL (ref 8.9–12.7)
PMV BLD AUTO: 9.3 FL (ref 8.9–12.7)
RBC # BLD AUTO: 3.08 MILLION/UL (ref 3.81–5.12)
RBC # BLD AUTO: 3.18 MILLION/UL (ref 3.81–5.12)
WBC # BLD AUTO: 12.93 THOUSAND/UL (ref 4.31–10.16)
WBC # BLD AUTO: 13.76 THOUSAND/UL (ref 4.31–10.16)

## 2018-06-09 PROCEDURE — 85025 COMPLETE CBC W/AUTO DIFF WBC: CPT | Performed by: INTERNAL MEDICINE

## 2018-06-09 PROCEDURE — 85025 COMPLETE CBC W/AUTO DIFF WBC: CPT | Performed by: OBSTETRICS & GYNECOLOGY

## 2018-06-09 PROCEDURE — 99232 SBSQ HOSP IP/OBS MODERATE 35: CPT | Performed by: INTERNAL MEDICINE

## 2018-06-09 RX ORDER — LANOLIN ALCOHOL/MO/W.PET/CERES
400 CREAM (GRAM) TOPICAL DAILY
Status: DISCONTINUED | OUTPATIENT
Start: 2018-06-09 | End: 2018-06-13 | Stop reason: HOSPADM

## 2018-06-09 RX ORDER — SIMETHICONE 80 MG
80 TABLET,CHEWABLE ORAL EVERY 6 HOURS PRN
Status: DISCONTINUED | OUTPATIENT
Start: 2018-06-09 | End: 2018-06-13 | Stop reason: HOSPADM

## 2018-06-09 RX ADMIN — DOCUSATE SODIUM 100 MG: 100 CAPSULE, LIQUID FILLED ORAL at 17:15

## 2018-06-09 RX ADMIN — IBUPROFEN 600 MG: 600 TABLET ORAL at 12:29

## 2018-06-09 RX ADMIN — ANTIHEMOPHILIC FACTOR/VON WILLEBRAND FACTOR COMPLEX (HUMAN) 3674 UNITS: KIT at 21:00

## 2018-06-09 RX ADMIN — OXYCODONE HYDROCHLORIDE AND ACETAMINOPHEN 1 TABLET: 5; 325 TABLET ORAL at 09:30

## 2018-06-09 RX ADMIN — OXYCODONE HYDROCHLORIDE AND ACETAMINOPHEN 1 TABLET: 5; 325 TABLET ORAL at 00:58

## 2018-06-09 RX ADMIN — OXYCODONE HYDROCHLORIDE AND ACETAMINOPHEN 1 TABLET: 5; 325 TABLET ORAL at 05:05

## 2018-06-09 RX ADMIN — GENTAMICIN SULFATE 240 MG: 40 INJECTION, SOLUTION INTRAMUSCULAR; INTRAVENOUS at 13:15

## 2018-06-09 RX ADMIN — Medication 400 MCG: at 18:00

## 2018-06-09 RX ADMIN — CLINDAMYCIN PHOSPHATE 900 MG: 18 INJECTION, SOLUTION INTRAMUSCULAR; INTRAVENOUS at 12:28

## 2018-06-09 RX ADMIN — DOCUSATE SODIUM 100 MG: 100 CAPSULE, LIQUID FILLED ORAL at 09:30

## 2018-06-09 RX ADMIN — SIMETHICONE CHEW TAB 80 MG 80 MG: 80 TABLET ORAL at 13:23

## 2018-06-09 RX ADMIN — CLINDAMYCIN PHOSPHATE 900 MG: 18 INJECTION, SOLUTION INTRAMUSCULAR; INTRAVENOUS at 05:00

## 2018-06-09 RX ADMIN — ANTIHEMOPHILIC FACTOR/VON WILLEBRAND FACTOR COMPLEX (HUMAN) 3714 UNITS: KIT at 05:06

## 2018-06-09 RX ADMIN — OXYCODONE HYDROCHLORIDE AND ACETAMINOPHEN 2 TABLET: 5; 325 TABLET ORAL at 17:16

## 2018-06-09 NOTE — PROGRESS NOTES
Patient: Libby Nova  Patient MRN: [id number]  Service date: 6/9/2018  Attending Physician:       CHIEF COMPLAIN  Chief Complaint   Patient presents with    Contractions     started @ 56       Heme / Oncology history:  Libby Nova is a 21 y o  female     1, VWD type IIN  - on Humate P starting 6/8, bid x 4     2, anemia 2nd to bleeding, s/p C section         HISTORY OF PRESENT ILLNESS:  Libby Nova is a 21 y o  female who has a hx of VWD, s/p c section on 6/8  Reported dong well  No bleeding  No dizziness, light headiness  No leg swelling, dyspnea  PROBLEM LIST:  Patient Active Problem List   Diagnosis    Von Willebrand disease ([facility] )    Positive GBS test    39 weeks gestation of pregnancy       ASSESSMENT/PLAN:  Libby Nova is a 21 y o  female with:    1) VWD  - type IIN, managed as hemophilia A  - on humate P    - factor level pending, possible available next week  - no bleeding, Hb stable  - no evidence of thrombosis    Plan  - continue humate P , follow Hb in AM, if stable, ok to switch to once daily maintenance dose  - primary team plan to D/C on Monday, she will follow closely with primary hematologist and likely need maintenance humate P or factor VIII depending on level  2) iron def anemia 2nd to bleeding  - offered iv iron, pt would like to consider  30      minutes were spent face to face with patient with greater than 50% of the time spent in counseling or coordination of care including discussions of treatment instructions  All of the patient's questions were answered to their satisfactory during this discussion   Jody Montoya MD PhD  Hematology / Oncology                              PAST MEDICAL HISTORY:   has a past medical history of Migraine; Placenta previa antepartum in first trimester; Varicella; and 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[facility] )  PAST SURGICAL HISTORY:   has a past surgical history that includes Tonsillectomy; Gallbladder surgery;  Cholecystectomy; and pr  delivery only (N/A, 2018)  CURRENT MEDICATIONS  Scheduled Meds:  Current Facility-Administered Medications:  acetaminophen 650 mg Oral Q6H PRN Venessa Campos MD    acetaminophen 650 mg Oral Q6H PRN Danyel Din    Antihemophilic Factor-VWF 3,895 Units Intravenous Once Lawyer Jenn MD    benzocaine-menthol-lanolin-aloe 1 application Topical X0C PRN Danyel Din    calcium carbonate 1,000 mg Oral Daily PRN Danyel Din    diphenhydrAMINE 25 mg Intravenous Q6H PRN Danyel Din    docusate sodium 100 mg Oral BID Danyel Din    fentaNYL 25 mcg Intravenous Q5 Min PRN Venessa Campos MD    hydrocortisone 1 application Topical Daily PRN Danyel Din    HYDROmorphone 0 5 mg Intravenous Q5 Min PRN Venessa Campos MD    ibuprofen 600 mg Oral Q6H PRN Danyel Din    lactated ringers 125 mL/hr Intravenous Continuous Danyel Din Last Rate: 125 mL/hr (18 173)   ondansetron 4 mg Intravenous Q6H PRN Ling Castro MD    oxyCODONE-acetaminophen 1 tablet Oral Q4H PRN Danyel Din    oxyCODONE-acetaminophen 2 tablet Oral Q4H PRN Danyel Din    simethicone 80 mg Oral Q6H PRN Ling Castro MD    witch hazel-glycerin 1 pad Topical Q4H PRN Danyel Din      Continuous Infusions:  lactated ringers 125 mL/hr Last Rate: 125 mL/hr (18 173)     PRN Meds:   acetaminophen    acetaminophen    benzocaine-menthol-lanolin-aloe    calcium carbonate    diphenhydrAMINE    fentaNYL    hydrocortisone    HYDROmorphone    ibuprofen    ondansetron    oxyCODONE-acetaminophen    oxyCODONE-acetaminophen    simethicone    witch hazel-glycerin    SOCIAL HISTORY:   reports that she has never smoked  She has never used smokeless tobacco  She reports that she does not drink alcohol or use drugs       FAMILY HISTORY:  family history includes Diabetes in her paternal grandfather and paternal grandmother; Hypertension in her paternal grandmother; No Known Problems in her brother, father, mother, and sister  ALLERGIES:  is allergic to aspirin  REVIEW OF SYSTEMS:  Please note that a 14-point review of systems was performed to include Constitutional, HEENT, Respiratory, CVS, GI, , Musculoskeletal, Integumentary, Neurologic, Rheumatologic, Endocrinologic, Psychiatric, Lymphatic, and Hematologic/Oncologic systems were reviewed and are negative unless otherwise stated in HPI  Positive and negative findings pertinent to this evaluation are incorporated into the history of present illness  PHYSICAL EXAMINATION:  Vital Signs: Temp:  [97 9 °F (36 6 °C)-99 3 °F (37 4 °C)] 99 3 °F (37 4 °C)  HR:  [] 94  Resp:  [18-20] 18  BP: ()/(52-62) 97/55  Body mass index is 40 19 kg/m²  Body surface area is 1 84 meters squared  Constitutional: Alert and oriented  HEENT: Anicteric, PERRLA  Chest: Decreased breathing sound bilaterally, No wheezes/rales/rhonchi  CVS: Regular rhythm  Normal rate  Abdomen: Soft, nontender, nondistended  No palpable organomegaly  Extremities: No cyanosis/clubbing/edema  Integumentary: No obvious rashes or bruises  Musculoskeletal: No obvious bony or joint deformities  Psychiatric: Appropriate affect and mood  Lymph Node Survey: No palpable preauricular, submandibular, cervical, supraclavicular, axillary, epitrochlear or inguinal lymphadenopathy      LABS:    Results from last 7 days  Lab Units 06/09/18  1139 06/09/18  0500 06/07/18  1607   WBC Thousand/uL 13 76* 12 93* 12 05*   HEMATOCRIT % 26 8* 27 3* 41 9   PLATELETS Thousands/uL 177 188 208   NEUTROS PCT % 78* 75 75   MONOS PCT % 6 6 6           Invalid input(s): CREA        Invalid input(s): CA, TPRO    Results from last 7 days  Lab Units 06/07/18  1731   INR  1 01   PTT seconds 38*           Invalid input(s): TNI,  PCT              IMAGING:  No orders to display

## 2018-06-09 NOTE — LACTATION NOTE
This note was copied from a baby's chart  Mom assisting infant to breast in football hold  Infant making some attempts, but slips off after only a few sucks  Infant tried at other breast in cross cradle hold-no better  Infant again placed at right breast in football hold  Mom works well with infant  Infant is holding latch better  Cautioned mom that infant needs to maintain latch more than a few sucks for milk transfer  Will also start mom pumping today when mom ready

## 2018-06-09 NOTE — LACTATION NOTE
This note was copied from a baby's chart  Mom states infant not yet feeding well  States infant latches only for a few sucks unless she is expressing co,colostrum into mouth  Also C/O spotty  Reviewed normal  infant feeding patterns in the first few days  Encouraged to call for assistance with latch  May start pumping if no improvement today

## 2018-06-09 NOTE — LACTATION NOTE
This note was copied from a baby's chart  Mom set up to start pumping as infant still perfecting latch  Instructions given as to technique, equipment cleaning  Mom obtained a few drops  Encouraged to pump every 3 hours

## 2018-06-09 NOTE — PROGRESS NOTES
Progress Note - OB/GYN   Love Frey 21 y o  female MRN: [id number]  Unit/Bed#:  314-01 Encounter: [id number]    Assessment:  POD#1 s/p Primary low transverse  section, stable    Plan:  1  Von Willebrand Disease: for last dose of humate/factor VIII at 1800, heme/onc following  2  Endometritis: on clinda/gent, remains afebrile, consider discontinuation of antibiotics at 1106 this morning if 24 hours afebrile  3  Continue routine postoperative care  4  Encourage ambulation  5  Encourage breastfeeding  6  Pain control as needed    Disposition: stable, anticipate discharge POD #3    Subjective/Objective   Chief Complaint:     POD#1 s/p Primary low transverse  section    Subjective:     Pain: incisional, improved with pain medication  Tolerating PO: yes  Voidin cc/hour output overnight, for voiding trial today  Flatus: yes  BM: no  Ambulating: plans for void once guzmán catheter removed  Breastfeeding: Breastfeeding  Chest pain: no  Shortness of breath: no  Leg pain: no  Lochia: scant    Objective:     Vitals:  Vitals:    18 1548 18 2007 18 0018 18 0436   BP: 118/52 91/52 102/55 106/57   BP Location: Right arm Left arm Right arm Right arm   Pulse: 97 103 104 104   Resp:    Temp: 98 5 °F (36 9 °C) 97 9 °F (36 6 °C) 98 5 °F (36 9 °C) 98 7 °F (37 1 °C)   TempSrc: Oral Oral Oral Oral   SpO2: 97% 98% 98% 98%   Weight:       Height:           Physical Exam:     Physical Exam   Constitutional: She is oriented to person, place, and time  She appears well-developed and well-nourished  Cardiovascular: Normal rate, regular rhythm and normal heart sounds  Pulmonary/Chest: Effort normal and breath sounds normal    Abdominal: Soft  Bowel sounds are normal    Neurological: She is alert and oriented to person, place, and time  Uterine fundus firm and non-tender, at the umbilicus       Lab, Imaging and other studies: I have personally reviewed pertinent reports        Lab Results   Component Value Date    WBC 12 93 (H) 06/09/2018    HGB 8 7 (L) 06/09/2018    HCT 27 3 (L) 06/09/2018    MCV 86 06/09/2018     06/09/2018               [id number] [facility], DO  06/09/18

## 2018-06-09 NOTE — PLAN OF CARE
DISCHARGE PLANNING     Discharge to home or other facility with appropriate resources Progressing        PAIN - ADULT     Verbalizes/displays adequate comfort level or baseline comfort level Progressing        POSTPARTUM     Experiences normal postpartum course Progressing     Appropriate maternal -  bonding Progressing     Establishment of infant feeding pattern Progressing     Incision(s), wounds(s) or drain site(s) healing without S/S of infection Progressing        SAFETY ADULT     Patient will remain free of falls Progressing     Maintain or return to baseline ADL function Progressing     Maintain or return mobility status to optimal level Progressing

## 2018-06-10 LAB
ABO GROUP BLD BPU: NORMAL
BASOPHILS # BLD AUTO: 0.02 THOUSANDS/ΜL (ref 0–0.1)
BASOPHILS NFR BLD AUTO: 0 % (ref 0–1)
BPU ID: NORMAL
CROSSMATCH: NORMAL
EOSINOPHIL # BLD AUTO: 0.12 THOUSAND/ΜL (ref 0–0.61)
EOSINOPHIL NFR BLD AUTO: 1 % (ref 0–6)
ERYTHROCYTE [DISTWIDTH] IN BLOOD BY AUTOMATED COUNT: 16.9 % (ref 11.6–15.1)
HCT VFR BLD AUTO: 27.1 % (ref 34.8–46.1)
HGB BLD-MCNC: 8.5 G/DL (ref 11.5–15.4)
IMM GRANULOCYTES # BLD AUTO: 0.13 THOUSAND/UL (ref 0–0.2)
IMM GRANULOCYTES NFR BLD AUTO: 1 % (ref 0–2)
LYMPHOCYTES # BLD AUTO: 1.88 THOUSANDS/ΜL (ref 0.6–4.47)
LYMPHOCYTES NFR BLD AUTO: 20 % (ref 14–44)
MCH RBC QN AUTO: 27.3 PG (ref 26.8–34.3)
MCHC RBC AUTO-ENTMCNC: 31.4 G/DL (ref 31.4–37.4)
MCV RBC AUTO: 87 FL (ref 82–98)
MONOCYTES # BLD AUTO: 0.58 THOUSAND/ΜL (ref 0.17–1.22)
MONOCYTES NFR BLD AUTO: 6 % (ref 4–12)
NEUTROPHILS # BLD AUTO: 6.74 THOUSANDS/ΜL (ref 1.85–7.62)
NEUTS SEG NFR BLD AUTO: 72 % (ref 43–75)
NRBC BLD AUTO-RTO: 0 /100 WBCS
PLATELET # BLD AUTO: 181 THOUSANDS/UL (ref 149–390)
PMV BLD AUTO: 9.7 FL (ref 8.9–12.7)
RBC # BLD AUTO: 3.11 MILLION/UL (ref 3.81–5.12)
UNIT DISPENSE STATUS: NORMAL
UNIT PRODUCT CODE: NORMAL
UNIT RH: NORMAL
WBC # BLD AUTO: 9.47 THOUSAND/UL (ref 4.31–10.16)

## 2018-06-10 PROCEDURE — 85025 COMPLETE CBC W/AUTO DIFF WBC: CPT | Performed by: INTERNAL MEDICINE

## 2018-06-10 RX ADMIN — ANTIHEMOPHILIC FACTOR/VON WILLEBRAND FACTOR COMPLEX (HUMAN) 3639 UNITS: KIT at 14:05

## 2018-06-10 RX ADMIN — SIMETHICONE CHEW TAB 80 MG 80 MG: 80 TABLET ORAL at 00:35

## 2018-06-10 RX ADMIN — DOCUSATE SODIUM 100 MG: 100 CAPSULE, LIQUID FILLED ORAL at 09:58

## 2018-06-10 RX ADMIN — SIMETHICONE CHEW TAB 80 MG 80 MG: 80 TABLET ORAL at 12:39

## 2018-06-10 RX ADMIN — DOCUSATE SODIUM 100 MG: 100 CAPSULE, LIQUID FILLED ORAL at 19:54

## 2018-06-10 RX ADMIN — IBUPROFEN 600 MG: 600 TABLET ORAL at 09:58

## 2018-06-10 RX ADMIN — OXYCODONE HYDROCHLORIDE AND ACETAMINOPHEN 2 TABLET: 5; 325 TABLET ORAL at 12:38

## 2018-06-10 RX ADMIN — Medication 400 MCG: at 09:59

## 2018-06-10 RX ADMIN — OXYCODONE HYDROCHLORIDE AND ACETAMINOPHEN 2 TABLET: 5; 325 TABLET ORAL at 00:35

## 2018-06-10 RX ADMIN — IBUPROFEN 600 MG: 600 TABLET ORAL at 19:54

## 2018-06-10 NOTE — PROGRESS NOTES
Patient: Jennifer Chávez  Patient MRN: [id number]  Service date: 6/10/2018  Attending Physician:       CHIEF COMPLAIN    Chief Complaint   Patient presents with    Contractions     started @ 56       Heme / Oncology history:  Jennifer Chávez is a 21 y o  female     1, VWD type IIN  - on Humate P starting 6/8, bid x 4     2, anemia 2nd to bleeding, s/p C section         HISTORY OF PRESENT ILLNESS:  Jennifer Chávez is a 21 y o  female who has a hx of VWD, s/p c section on 6/8  Reported dong well  No bleeding  No dizziness, light headiness  No leg swelling, dyspnea  PROBLEM LIST:    Patient Active Problem List   Diagnosis    Von Willebrand disease ([facility] )    Positive GBS test    39 weeks gestation of pregnancy       ASSESSMENT/PLAN:  Jennifer Chávez is a 21 y o  female with:    1) VWD  - possible type IIN due to factor 8 Ag low, possible other types , need full work up as outpt  - on humate P    - factor level pending, possible available next week  difficult to adjust dose given no level  However, clinically stable, Hb stable  - no evidence of thrombosis    Plan  - continue humate P x 1 today, follow Hb in AM  - primary team plan to D/C on Monday, she will follow closely with primary hematologist and likely need maintenance humate P or factor VIII depending on level  2) iron def anemia 2nd to bleeding  - offered iv iron, pt would like to consider  30      minutes were spent face to face with patient with greater than 50% of the time spent in counseling or coordination of care including discussions of treatment instructions  All of the patient's questions were answered to their satisfactory during this discussion   Zhang Sarmiento MD PhD  Hematology / Oncology                              PAST MEDICAL HISTORY:   has a past medical history of Migraine; Placenta previa antepartum in first trimester; Varicella; and [facility] Reap [facility] ([facility] )      PAST SURGICAL HISTORY:   has a past surgical history that includes Tonsillectomy; Gallbladder surgery; Cholecystectomy; and pr  delivery only (N/A, 2018)  CURRENT MEDICATIONS  Scheduled Meds:    Current Facility-Administered Medications:  acetaminophen 650 mg Oral Q6H PRN Dennis Iniguez MD    acetaminophen 650 mg Oral Q6H PRN Angeles Breeding    Antihemophilic Factor-VWF 3,744 Units Intravenous Once Laurie Leahy MD PhD    benzocaine-menthol-lanolin-aloe 1 application Topical B1O PRN Angeles Breeding    calcium carbonate 1,000 mg Oral Daily PRN Angeles Breeding    diphenhydrAMINE 25 mg Intravenous Q6H PRN Angeles Breeding    docusate sodium 100 mg Oral BID Angeles Breeding    fentaNYL 25 mcg Intravenous Q5 Min PRN Dennis Iniguez MD    folic acid 450 mcg Oral Daily Laurie Leahy MD PhD    hydrocortisone 1 application Topical Daily PRN Angeles Breeding    HYDROmorphone 0 5 mg Intravenous Q5 Min PRN Dennis Iniguez MD    ibuprofen 600 mg Oral Q6H PRN Angeles Breeding    lactated ringers 125 mL/hr Intravenous Continuous Angeles Breeding Last Rate: 125 mL/hr (18)   ondansetron 4 mg Intravenous Q6H PRN Latrice Farr MD    oxyCODONE-acetaminophen 1 tablet Oral Q4H PRN Angeles Breeding    oxyCODONE-acetaminophen 2 tablet Oral Q4H PRN Angeles Breeding    simethicone 80 mg Oral Q6H PRN Latrice Farr MD    witch hazel-glycerin 1 pad Topical Q4H PRN Angeles Breeding      Continuous Infusions:    lactated ringers 125 mL/hr Last Rate: 125 mL/hr (18 173)     PRN Meds:   acetaminophen    acetaminophen    benzocaine-menthol-lanolin-aloe    calcium carbonate    diphenhydrAMINE    fentaNYL    hydrocortisone    HYDROmorphone    ibuprofen    ondansetron    oxyCODONE-acetaminophen    oxyCODONE-acetaminophen    simethicone    witch hazel-glycerin    SOCIAL HISTORY:   reports that she has never smoked   She has never used smokeless tobacco  She reports that she does not drink alcohol or use drugs  FAMILY HISTORY:  family history includes Diabetes in her paternal grandfather and paternal grandmother; Hypertension in her paternal grandmother; No Known Problems in her brother, father, mother, and sister  ALLERGIES:  is allergic to aspirin  REVIEW OF SYSTEMS:  Please note that a 14-point review of systems was performed to include Constitutional, HEENT, Respiratory, CVS, GI, , Musculoskeletal, Integumentary, Neurologic, Rheumatologic, Endocrinologic, Psychiatric, Lymphatic, and Hematologic/Oncologic systems were reviewed and are negative unless otherwise stated in HPI  Positive and negative findings pertinent to this evaluation are incorporated into the history of present illness  PHYSICAL EXAMINATION:  Vital Signs: Temp:  [98 1 °F (36 7 °C)-99 3 °F (37 4 °C)] 98 7 °F (37 1 °C)  HR:  [] 100  Resp:  [18-20] 20  BP: ()/(53-72) 112/53  Body mass index is 40 19 kg/m²  Body surface area is 1 84 meters squared  Constitutional: Alert and oriented  HEENT: Anicteric, PERRLA  Chest: Decreased breathing sound bilaterally, No wheezes/rales/rhonchi  CVS: Regular rhythm  Normal rate  Abdomen: Soft, nontender, nondistended  No palpable organomegaly  Extremities: No cyanosis/clubbing/edema  Integumentary: No obvious rashes or bruises  Musculoskeletal: No obvious bony or joint deformities  Psychiatric: Appropriate affect and mood  Lymph Node Survey: No palpable preauricular, submandibular, cervical, supraclavicular, axillary, epitrochlear or inguinal lymphadenopathy      LABS:    Results from last 7 days  Lab Units 06/10/18  0519 06/09/18  1139 06/09/18  0500   WBC Thousand/uL 9 47 13 76* 12 93*   HEMATOCRIT % 27 1* 26 8* 27 3*   PLATELETS Thousands/uL 181 177 188   NEUTROS PCT % 72 78* 75   MONOS PCT % 6 6 6           Invalid input(s): CREA        Invalid input(s): CA, TPRO    Results from last 7 days  Lab Units 06/07/18  1731   INR  1 01   PTT seconds 45*           Invalid input(s): TNI,  PCT              IMAGING:    No orders to display

## 2018-06-10 NOTE — LACTATION NOTE
Mom states she is obtaining more colostrum via pump now  Stressed frequency of pumping needed to establish milk supply

## 2018-06-10 NOTE — SOCIAL WORK
NICU admission  No CM consults  Baby boy Nicole Stern was born via c/s on 6/8 at 39wks  NICU for subgaleal hemorrhage  Met with pt ([phone]) and FOB/SO Nicolette Brooks ([phone]) to introduce Cm services and provide CM contact info  Pt and FOB report they are doing ok and this is 1st kid for couple who live together in [address], have good support, have all baby supplies needed at this time including breast pump from insurance, and have cars for transportation as needed  Pt and FOB are both employed with some paid leave time  Pt denies any mental health issues or drug use  No VNA, C&Y, or POA reported  Pt has primary insurance through her father and secondary [employer] from her job  Pt reports baby will be added to her [employer] policy only  CM notified billing and patient accounts of same via secure email  CM reviewed d/c planning process including the following: identifying help at home, patient preference for d/c planning needs, Discharge Lounge, [facility] Meds to Bed program, availability of treatment team to discuss questions or concerns patient and/or family may have regarding understanding medications and recognizing signs and symptoms once discharged  CM also encouraged patient to follow up with all recommended appointments after discharge  Patient advised of importance for patient and family to participate in managing patients medical well being  Pt and FOB deny any CM needs at this time  Encouraged family to contact Cm as needed  No other needs noted  Will follow baby in NICU

## 2018-06-10 NOTE — PROGRESS NOTES
Progress Note - OB/GYN   Royal Cavazos 21 y o  female MRN: [id number]  Unit/Bed#: -01 Encounter: [id number]    Assessment:  POD#2 s/p Primary low transverse  section, stable    Plan:  1  Von Willebrand Disease: for last dose of humate/factor VIII at 1800, heme/onc following  2  Endometritis: s/p clinda/gent, afebrile past 24 hours  3  Continue routine postoperative care  4  Encourage ambulation  5  Encourage breastfeeding  6  Pain control as needed  7  Baby in NICU-concern for head bleed     Disposition: stable, anticipate discharge POD #3    Subjective/Objective   Chief Complaint:     PP/POD#2 s/p Primary low transverse  section    Subjective:     Pain: incisional, improved with pain medication  Tolerating PO: yes  Voiding: yes  Flatus: yes  BM: no  Ambulating: yes  Breastfeeding: Breastfeeding  Chest pain: no  Shortness of breath: no  Leg pain: no  Lochia: minimal    Objective:     Vitals:  Vitals:    18 2015 06/10/18 0013 06/10/18 0400 06/10/18 0748   BP: 108/72 109/64 113/58 112/53   BP Location: Right arm Right arm Right arm    Pulse: 81 102 (!) 108 100   Resp: 18 18 18 20   Temp: 98 1 °F (36 7 °C) 99 2 °F (37 3 °C) 99 °F (37 2 °C) 98 7 °F (37 1 °C)   TempSrc: Oral Oral Oral Oral   SpO2:  98%     Weight:       Height:           Physical Exam:     Physical Exam   Constitutional: She is oriented to person, place, and time  She appears well-developed and well-nourished  Cardiovascular: Normal rate, regular rhythm and normal heart sounds  Pulmonary/Chest: Effort normal and breath sounds normal    Abdominal: Soft  Bowel sounds are normal    Incision clean, dry, and intact  Closed with running absorbable sutures  Neurological: She is alert and oriented to person, place, and time  Uterine fundus firm and non-tender, -1 cm below the umbilicus       Lab, Imaging and other studies: I have personally reviewed pertinent reports        Lab Results   Component Value Date    WBC 9 47 06/10/2018    HGB 8 5 (L) 06/10/2018    HCT 27 1 (L) 06/10/2018    MCV 87 06/10/2018     06/10/2018               Jazmine Dyer DO  06/10/18

## 2018-06-11 ENCOUNTER — APPOINTMENT (INPATIENT)
Dept: NON INVASIVE DIAGNOSTICS | Facility: HOSPITAL | Age: 23
End: 2018-06-11
Payer: COMMERCIAL

## 2018-06-11 LAB
BASOPHILS # BLD AUTO: 0.02 THOUSANDS/ΜL (ref 0–0.1)
BASOPHILS NFR BLD AUTO: 0 % (ref 0–1)
EOSINOPHIL # BLD AUTO: 0.15 THOUSAND/ΜL (ref 0–0.61)
EOSINOPHIL NFR BLD AUTO: 2 % (ref 0–6)
ERYTHROCYTE [DISTWIDTH] IN BLOOD BY AUTOMATED COUNT: 16.6 % (ref 11.6–15.1)
FACT XIIIA PPP-ACNC: 67 % (ref 57–163)
HCT VFR BLD AUTO: 28.8 % (ref 34.8–46.1)
HGB BLD-MCNC: 9 G/DL (ref 11.5–15.4)
IMM GRANULOCYTES # BLD AUTO: 0.13 THOUSAND/UL (ref 0–0.2)
IMM GRANULOCYTES NFR BLD AUTO: 2 % (ref 0–2)
LYMPHOCYTES # BLD AUTO: 1.92 THOUSANDS/ΜL (ref 0.6–4.47)
LYMPHOCYTES NFR BLD AUTO: 26 % (ref 14–44)
MCH RBC QN AUTO: 27.4 PG (ref 26.8–34.3)
MCHC RBC AUTO-ENTMCNC: 31.3 G/DL (ref 31.4–37.4)
MCV RBC AUTO: 88 FL (ref 82–98)
MONOCYTES # BLD AUTO: 0.61 THOUSAND/ΜL (ref 0.17–1.22)
MONOCYTES NFR BLD AUTO: 8 % (ref 4–12)
NEUTROPHILS # BLD AUTO: 4.63 THOUSANDS/ΜL (ref 1.85–7.62)
NEUTS SEG NFR BLD AUTO: 62 % (ref 43–75)
NRBC BLD AUTO-RTO: 0 /100 WBCS
PLATELET # BLD AUTO: 216 THOUSANDS/UL (ref 149–390)
PMV BLD AUTO: 9.3 FL (ref 8.9–12.7)
RBC # BLD AUTO: 3.28 MILLION/UL (ref 3.81–5.12)
VWF:RCO ACT/NOR PPP PL AGG: 276 % (ref 50–200)
WBC # BLD AUTO: 7.46 THOUSAND/UL (ref 4.31–10.16)

## 2018-06-11 PROCEDURE — 85025 COMPLETE CBC W/AUTO DIFF WBC: CPT | Performed by: INTERNAL MEDICINE

## 2018-06-11 PROCEDURE — 93970 EXTREMITY STUDY: CPT

## 2018-06-11 PROCEDURE — 99232 SBSQ HOSP IP/OBS MODERATE 35: CPT | Performed by: INTERNAL MEDICINE

## 2018-06-11 PROCEDURE — 93970 EXTREMITY STUDY: CPT | Performed by: SURGERY

## 2018-06-11 RX ADMIN — OXYCODONE HYDROCHLORIDE AND ACETAMINOPHEN 2 TABLET: 5; 325 TABLET ORAL at 06:17

## 2018-06-11 RX ADMIN — DOCUSATE SODIUM 100 MG: 100 CAPSULE, LIQUID FILLED ORAL at 16:58

## 2018-06-11 RX ADMIN — IBUPROFEN 600 MG: 600 TABLET ORAL at 16:57

## 2018-06-11 RX ADMIN — DOCUSATE SODIUM 100 MG: 100 CAPSULE, LIQUID FILLED ORAL at 08:56

## 2018-06-11 RX ADMIN — Medication 400 MCG: at 08:56

## 2018-06-11 RX ADMIN — OXYCODONE HYDROCHLORIDE AND ACETAMINOPHEN 2 TABLET: 5; 325 TABLET ORAL at 11:04

## 2018-06-11 NOTE — PROGRESS NOTES
Pt complaining of pain on bilateral exterior calves and behind both knees  + Delta's sign  No redness or warmth  +2 bilateral lower extremity pitting edema  Right calf 43 5 cm  Left calf 44 cm  Dr Gino Morales made aware  No new orders at this time

## 2018-06-11 NOTE — LACTATION NOTE
Azra Patricio reports she is getting approximately 6 ml's at a pumping and that she is pumping every three hours  Given education on Increasing Breast Milk Supply for  A Baby in the NICU  Demonstrated how to use the pumping log to accommodate expectations on production of breast milk and given a tube of fabric to secure breast pump flanges so mom could massage breasts while pumping to increase her supply  Encoraged MOB and FOB to call for assistance, questions and concerns  Extension number for inpatient lactation support provided

## 2018-06-11 NOTE — PROGRESS NOTES
Patient: Erika Fernández  Patient MRN: [id number]  Service date: 6/11/2018  Attending Physician:       CHIEF COMPLAIN    Chief Complaint   Patient presents with    Contractions     started @ 56       Heme / Oncology history:  Erika Fernández is a 21 y o  female     1, VWD type IIN  - on Humate P starting 6/8, bid x 4,  Then daily x 1     2, anemia 2nd to bleeding, s/p C section         HISTORY OF PRESENT ILLNESS:  Erika Fernández is a 21 y o  female who has a hx of VWD, s/p c section on 6/8  Reported dong well  No bleeding  No dizziness, light headiness  No leg swelling, dyspnea  PROBLEM LIST:    Patient Active Problem List   Diagnosis    Von Willebrand disease ([facility] )    Positive GBS test    39 weeks gestation of pregnancy       ASSESSMENT/PLAN:  Erika Fernández is a 21 y o  female with:    1) VWD  - possible type IIN due to low factor 8 Ag , possible other types , need full work up as outpt  - received total 5 doses of  humate P    - factor level pending, possible available next week  difficult to adjust dose given no level  However, clinically stable, Hb stable  - no evidence of thrombosis    Plan  -  - primary team plan to D/C  , she will follow closely with primary hematologist and likely need maintenance humate P or factor VIII depending on level  2) iron def anemia 2nd to bleeding  - offered iv iron, pt would like to consider    - Hb improving  30      minutes were spent face to face with patient with greater than 50% of the time spent in counseling or coordination of care including discussions of treatment instructions  All of the patient's questions were answered to their satisfactory during this discussion   Maddie Haines MD PhD  Hematology / Oncology                              PAST MEDICAL HISTORY:   has a past medical history of Migraine; Placenta previa antepartum in first trimester; Varicella; and Colletta Spice disease ([facility] )      PAST SURGICAL HISTORY:   has a past surgical history that includes Tonsillectomy; Gallbladder surgery; Cholecystectomy; and pr  delivery only (N/A, 2018)  CURRENT MEDICATIONS  Scheduled Meds:    Current Facility-Administered Medications:  acetaminophen 650 mg Oral Q6H PRN Uzair Guerra MD    acetaminophen 650 mg Oral Q6H PRN Versa Champ    benzocaine-menthol-lanolin-aloe 1 application Topical M7G PRN Versa Champ    calcium carbonate 1,000 mg Oral Daily PRN Versa Champ    diphenhydrAMINE 25 mg Intravenous Q6H PRN Versa Champ    docusate sodium 100 mg Oral BID Versa Champ    fentaNYL 25 mcg Intravenous Q5 Min PRN Uzair Guerra MD    folic acid 897 mcg Oral Daily Hilda Encarnacion MD PhD    hydrocortisone 1 application Topical Daily PRN Versa Champ    HYDROmorphone 0 5 mg Intravenous Q5 Min PRN Uzair Guerra MD    ibuprofen 600 mg Oral Q6H PRN Versa Champ    lactated ringers 125 mL/hr Intravenous Continuous Versa Champ Last Rate: 125 mL/hr (18 173)   ondansetron 4 mg Intravenous Q6H PRN Latonya Watt MD    oxyCODONE-acetaminophen 1 tablet Oral Q4H PRN Versa Champ    oxyCODONE-acetaminophen 2 tablet Oral Q4H PRN Versa Champ    simethicone 80 mg Oral Q6H PRN Latonya Watt MD    witch hazel-glycerin 1 pad Topical Q4H PRN Versa Champ      Continuous Infusions:    lactated ringers 125 mL/hr Last Rate: 125 mL/hr (18 173)     PRN Meds:   acetaminophen    acetaminophen    benzocaine-menthol-lanolin-aloe    calcium carbonate    diphenhydrAMINE    fentaNYL    hydrocortisone    HYDROmorphone    ibuprofen    ondansetron    oxyCODONE-acetaminophen    oxyCODONE-acetaminophen    simethicone    witch hazel-glycerin    SOCIAL HISTORY:   reports that she has never smoked  She has never used smokeless tobacco  She reports that she does not drink alcohol or use drugs       FAMILY HISTORY:  family history includes Diabetes in her paternal grandfather and paternal grandmother; Hypertension in her paternal grandmother; No Known Problems in her brother, father, mother, and sister  ALLERGIES:  is allergic to aspirin  REVIEW OF SYSTEMS:  Please note that a 14-point review of systems was performed to include Constitutional, HEENT, Respiratory, CVS, GI, , Musculoskeletal, Integumentary, Neurologic, Rheumatologic, Endocrinologic, Psychiatric, Lymphatic, and Hematologic/Oncologic systems were reviewed and are negative unless otherwise stated in HPI  Positive and negative findings pertinent to this evaluation are incorporated into the history of present illness  PHYSICAL EXAMINATION:  Vital Signs: Temp:  [98 1 °F (36 7 °C)-98 9 °F (37 2 °C)] 98 2 °F (36 8 °C)  HR:  [81-91] 91  Resp:  [16-20] 18  BP: ()/(53-71) 118/71  Body mass index is 40 19 kg/m²  Body surface area is 1 84 meters squared  Constitutional: Alert and oriented  HEENT: Anicteric, PERRLA  Chest: Decreased breathing sound bilaterally, No wheezes/rales/rhonchi  CVS: Regular rhythm  Normal rate  Abdomen: Soft, nontender, nondistended  No palpable organomegaly  Extremities: No cyanosis/clubbing/edema  Integumentary: No obvious rashes or bruises  Musculoskeletal: No obvious bony or joint deformities  Psychiatric: Appropriate affect and mood  Lymph Node Survey: No palpable preauricular, submandibular, cervical, supraclavicular, axillary, epitrochlear or inguinal lymphadenopathy      LABS:    Results from last 7 days  Lab Units 06/11/18  0903 06/10/18  0519 06/09/18  1139   WBC Thousand/uL 7 46 9 47 13 76*   HEMATOCRIT % 28 8* 27 1* 26 8*   PLATELETS Thousands/uL 216 181 177   NEUTROS PCT % 62 72 78*   MONOS PCT % 8 6 6           Invalid input(s): CREA        Invalid input(s): CA, TPRO    Results from last 7 days  Lab Units 06/07/18  1731   INR  1 01   PTT seconds 38*           Invalid input(s): TNI,  PCT IMAGING:    VAS lower limb venous duplex study, complete bilateral    (Results Pending)

## 2018-06-11 NOTE — PLAN OF CARE
DISCHARGE PLANNING     Discharge to home or other facility with appropriate resources Progressing        DISCHARGE PLANNING - CARE MANAGEMENT     Discharge to post-acute care or home with appropriate resources Progressing        PAIN - ADULT     Verbalizes/displays adequate comfort level or baseline comfort level Progressing        POSTPARTUM     Experiences normal postpartum course Progressing     Appropriate maternal -  bonding Progressing     Establishment of infant feeding pattern Progressing     Incision(s), wounds(s) or drain site(s) healing without S/S of infection Progressing        SAFETY ADULT     Patient will remain free of falls Progressing     Maintain or return to baseline ADL function Progressing     Maintain or return mobility status to optimal level Progressing

## 2018-06-11 NOTE — PROGRESS NOTES
Progress Note - OB/GYN   Lisy Carvajal 21 y o  female MRN: [id number]  Unit/Bed#: -01 Encounter: [id number]    Assessment:  Postop Day #3 s/p 1LTCS, stable  Baby in NICU for subgaleal hemorrhage    Plan:  1) Von Willebrand Disease   Completed humate dosages   Needs full workup after discharge   Onc following    2) Endometritis   S/p clindamycin/gentamicin   Afebrile last 24 hours    3) Bilateral LE tenderness   F/u bilateral LE dopplers   VSS     4) Continue routine post partum/post op care   Encourage ambulation   Encourage breastfeeding   Anticipate discharge today (pending DVT workup)    Subjective/Objective   Chief Complaint:     Post delivery  Patient complains of bilateral lower extremity tenderness, chest pain, and shortness of breath  She states that the chest pain is only upon respiration and the shortness of breath is due to abdominal pain s/p 1LTCS  She states that this behind the knee tenderness started overnight  She has been ambulating and reports the pain to be present any time she flexes or extends her feet  She has been wearing her SCDs intermittently  She was not wearing her SCDs overnight  She denies history of DVT or PE  Lochia WNL  Pain well controlled  Subjective:     Pain: yes, incisional, improved with meds  Tolerating PO: yes  Voiding: yes  Flatus: yes  BM: no  Ambulating: yes  Breastfeeding:  yes  Chest pain: yes  Shortness of breath: yes  Leg pain: no  Lochia: minimal    Objective:     Vitals: /64 (BP Location: Right arm)   Pulse 87   Temp 98 9 °F (37 2 °C) (Oral)   Resp 16   Ht 4' 11" (1 499 m)   Wt 90 3 kg (199 lb)   LMP 06/08/2017   SpO2 98%   Breastfeeding?  Yes   BMI 40 19 kg/m²     No intake or output data in the 24 hours ending 06/11/18 0620    Physical Exam:     AAOx3, NAD  CV, RRR  CTA b/l, no WRR  Soft, non-tender, non-distended, no rebound or guarding, no CVA tenderness  Uterine fundus firm and non-tender, -1 cm below the umbilicus   Incision clean/dry/intact without signs of inflammation   sutures: clean, dry, and intact   Tenderness to palpation behind both knees, positive Delta's sign on left LE     Non tender      Lab Results   Component Value Date    WBC 9 47 06/10/2018    HGB 8 5 (L) 06/10/2018    HCT 27 1 (L) 06/10/2018    MCV 87 06/10/2018     06/10/2018                         Mena Hameed MD  6/11/2018  6:20 AM

## 2018-06-12 VITALS
SYSTOLIC BLOOD PRESSURE: 120 MMHG | WEIGHT: 199 LBS | BODY MASS INDEX: 40.12 KG/M2 | DIASTOLIC BLOOD PRESSURE: 68 MMHG | HEIGHT: 59 IN | HEART RATE: 90 BPM | OXYGEN SATURATION: 98 % | RESPIRATION RATE: 20 BRPM | TEMPERATURE: 99 F

## 2018-06-12 PROBLEM — Z3A.39 39 WEEKS GESTATION OF PREGNANCY: Status: RESOLVED | Noted: 2018-06-07 | Resolved: 2018-06-12

## 2018-06-12 PROBLEM — Z98.891 S/P CESAREAN SECTION: Status: ACTIVE | Noted: 2018-06-12

## 2018-06-12 PROBLEM — B95.1 POSITIVE GBS TEST: Status: RESOLVED | Noted: 2018-05-14 | Resolved: 2018-06-12

## 2018-06-12 LAB
BASOPHILS # BLD AUTO: 0.02 THOUSANDS/ΜL (ref 0–0.1)
BASOPHILS NFR BLD AUTO: 0 % (ref 0–1)
EOSINOPHIL # BLD AUTO: 0.18 THOUSAND/ΜL (ref 0–0.61)
EOSINOPHIL NFR BLD AUTO: 2 % (ref 0–6)
ERYTHROCYTE [DISTWIDTH] IN BLOOD BY AUTOMATED COUNT: 16.5 % (ref 11.6–15.1)
FACT VIII AG ACT/NOR PPP IA: 72 %
HCT VFR BLD AUTO: 31 % (ref 34.8–46.1)
HGB BLD-MCNC: 9.7 G/DL (ref 11.5–15.4)
IMM GRANULOCYTES # BLD AUTO: 0.13 THOUSAND/UL (ref 0–0.2)
IMM GRANULOCYTES NFR BLD AUTO: 2 % (ref 0–2)
LYMPHOCYTES # BLD AUTO: 2.26 THOUSANDS/ΜL (ref 0.6–4.47)
LYMPHOCYTES NFR BLD AUTO: 28 % (ref 14–44)
MCH RBC QN AUTO: 27.2 PG (ref 26.8–34.3)
MCHC RBC AUTO-ENTMCNC: 31.3 G/DL (ref 31.4–37.4)
MCV RBC AUTO: 87 FL (ref 82–98)
MONOCYTES # BLD AUTO: 0.57 THOUSAND/ΜL (ref 0.17–1.22)
MONOCYTES NFR BLD AUTO: 7 % (ref 4–12)
NEUTROPHILS # BLD AUTO: 4.88 THOUSANDS/ΜL (ref 1.85–7.62)
NEUTS SEG NFR BLD AUTO: 61 % (ref 43–75)
NRBC BLD AUTO-RTO: 0 /100 WBCS
PLATELET # BLD AUTO: 280 THOUSANDS/UL (ref 149–390)
PMV BLD AUTO: 9.2 FL (ref 8.9–12.7)
RBC # BLD AUTO: 3.56 MILLION/UL (ref 3.81–5.12)
WBC # BLD AUTO: 8.04 THOUSAND/UL (ref 4.31–10.16)

## 2018-06-12 PROCEDURE — 85025 COMPLETE CBC W/AUTO DIFF WBC: CPT | Performed by: INTERNAL MEDICINE

## 2018-06-12 RX ORDER — ACETAMINOPHEN 325 MG/1
TABLET ORAL
Qty: 30 TABLET | Refills: 0 | Status: SHIPPED | OUTPATIENT
Start: 2018-06-12 | End: 2019-01-10

## 2018-06-12 RX ORDER — IBUPROFEN 600 MG/1
600 TABLET ORAL EVERY 6 HOURS PRN
Qty: 30 TABLET | Refills: 0 | Status: SHIPPED | OUTPATIENT
Start: 2018-06-12 | End: 2019-01-10

## 2018-06-12 RX ORDER — OXYCODONE HYDROCHLORIDE AND ACETAMINOPHEN 5; 325 MG/1; MG/1
1 TABLET ORAL EVERY 4 HOURS PRN
Qty: 15 TABLET | Refills: 0 | Status: SHIPPED | OUTPATIENT
Start: 2018-06-12 | End: 2018-06-22

## 2018-06-12 RX ORDER — DOCUSATE SODIUM 100 MG/1
100 CAPSULE, LIQUID FILLED ORAL 2 TIMES DAILY
Qty: 10 CAPSULE | Refills: 0 | Status: SHIPPED | OUTPATIENT
Start: 2018-06-12 | End: 2019-01-10

## 2018-06-12 RX ADMIN — OXYCODONE HYDROCHLORIDE AND ACETAMINOPHEN 2 TABLET: 5; 325 TABLET ORAL at 15:53

## 2018-06-12 RX ADMIN — OXYCODONE HYDROCHLORIDE AND ACETAMINOPHEN 2 TABLET: 5; 325 TABLET ORAL at 04:15

## 2018-06-12 RX ADMIN — DOCUSATE SODIUM 100 MG: 100 CAPSULE, LIQUID FILLED ORAL at 18:34

## 2018-06-12 RX ADMIN — OXYCODONE HYDROCHLORIDE AND ACETAMINOPHEN 2 TABLET: 5; 325 TABLET ORAL at 23:37

## 2018-06-12 RX ADMIN — OXYCODONE HYDROCHLORIDE AND ACETAMINOPHEN 1 TABLET: 5; 325 TABLET ORAL at 10:48

## 2018-06-12 RX ADMIN — Medication 400 MCG: at 09:34

## 2018-06-12 RX ADMIN — DOCUSATE SODIUM 100 MG: 100 CAPSULE, LIQUID FILLED ORAL at 09:34

## 2018-06-12 NOTE — PROGRESS NOTES
Progress Note - OB/GYN   Bay Wolfe 21 y o  female MRN: [id number]  Unit/Bed#: -01 Encounter: [id number]    Assessment:  Postop Day #4 s/p 1LTCS, stable  Baby in NICU for subgaleal hemorrhage    Plan:  1) Von Willebrand Disease              Completed humate dosages              Needs full workup after discharge              Onc following     2) Endometritis              S/p clindamycin/gentamicin              Afebrile last 24 hours     3) Bilateral LE tenderness, resolved  Dopplers negative for DVT  4) Continue routine post partum/post op care              Encourage ambulation              Encourage breastfeeding              Anticipate discharge today (boarding tonight)    Subjective/Objective   Chief Complaint:     Post delivery  Patient is feeling well  Lochia WNL  Pain well controlled  Subjective:     Pain: yes, incisional, improved with meds  Tolerating PO: yes  Voiding: yes  Flatus: yes  BM: yes  Ambulating: yes  Breastfeeding:  yes  Chest pain: no  Shortness of breath: no  Leg pain: no  Lochia: minimal    Objective:     Vitals: /70 (BP Location: Left arm)   Pulse 87   Temp 98 2 °F (36 8 °C) (Oral)   Resp 18   Ht 4' 11" (1 499 m)   Wt 90 3 kg (199 lb)   LMP 06/08/2017   SpO2 98%   Breastfeeding?  Yes   BMI 40 19 kg/m²     No intake or output data in the 24 hours ending 06/12/18 0616    Physical Exam:     AAOx3, NAD  CV, RRR  CTA b/l, no WRR  Soft, non-tender, non-distended, no rebound or guarding, no CVA tenderness  Uterine fundus firm and non-tender, -1 cm below the umbilicus   Incision clean/dry/intact without signs of inflammation   sutures: clean, dry, and intact  Non tender      Lab Results   Component Value Date    WBC 7 46 06/11/2018    HGB 9 0 (L) 06/11/2018    HCT 28 8 (L) 06/11/2018    MCV 88 06/11/2018     06/11/2018                         Benoit Hadley MD  6/12/2018  6:16 AM

## 2018-06-12 NOTE — LACTATION NOTE
This note was copied from a baby's chart  Met Marni in the NICU for feeding assessment and latch check  Her son is recovering from a subgaleal hemorrhage and has pain on handling  Infant did latch and nurse for about 10 minutes, but needs cushioning under his head  Saul Dunn stated that yesterday she  using the football hold but wanted to try another position  I had her hold using a cross-cradle hold which allows her hold without pressure on the back of the baby's head  I strongly recommended that they schedule an appointment at the [facility] Support center for continuing lactation support and feeding evaluation

## 2018-06-12 NOTE — PLAN OF CARE
DISCHARGE PLANNING     Discharge to home or other facility with appropriate resources Adequate for Discharge        DISCHARGE PLANNING - CARE MANAGEMENT     Discharge to post-acute care or home with appropriate resources Adequate for Discharge        PAIN - ADULT     Verbalizes/displays adequate comfort level or baseline comfort level Adequate for Discharge        POSTPARTUM     Experiences normal postpartum course Adequate for Discharge     Appropriate maternal -  bonding Adequate for Discharge     Establishment of infant feeding pattern Adequate for Discharge     Incision(s), wounds(s) or drain site(s) healing without S/S of infection Adequate for Discharge        SAFETY ADULT     Patient will remain free of falls Adequate for Discharge     Maintain or return to baseline ADL function Adequate for Discharge     Maintain or return mobility status to optimal level Adequate for Discharge

## 2018-06-12 NOTE — LACTATION NOTE
Mom is pumping every 2-3 hours for infant in NICU  States she should be able to put infant to the breast today, voices concerns about putting pressure on the back of his head due to his subgaleal hemorrhage  Discussed positions that may limit any pain during BF  Enc to call for assistance with latch at 2 pm feed in NICU    DC info given on breast care, smoking,alcohol and medications while BF, and [employer]

## 2018-06-13 NOTE — CASE MANAGEMENT
Notification of Maternity Inpatient Admission/Maternity Inpatient Authorization Request  This is a Notification of Maternity Inpatient Admission/Maternity Inpatient Authorization Request to our facility [facility]  Please be advised that this patient is currently in our facility under Inpatient Status  Below you will find the Birth/ Summary, Attending Physician and Facilitys information including NPI# and contact for the Utilization  assigned to the [facility] where the patient is receiving services  Please feel free to contact the Utilization Review Department with any questions  Mothers Information:  Joseph Shearer  MRN: [id number]  YOB: 1995  Admission Date: 2018  2:12 PM  Discharge Date: 2018 11:50 PM  Disposition: Home/Self Care  Admitting Diagnosis:   O82  DELIVERY   Information:  Estimated Date of Delivery: 18  Information for the patient's :  Ayleen Duke) [[id number]]      Delivery Information:  Sex: male  Delivered 2018 5:53 AM by , Low Transverse; Gestational Age: 43w3d    [place] Measurements:  Weight: 8 lb 8 9 oz (3880 g); Height: 19 5"    APGAR 1 minute 5 minutes 10 minutes   Totals: 9 9      OB History as of 18      Para Term  AB Living    1 1 1 0 0 1    SAB TAB Ectopic Multiple Live Births    0 0 0 0 1        Attending Physician:  LURDES Ring    Specialty- Obstetrics and Gynecology  [address] [id number]  [address] [place] [address],[facility]  [place], [address] [facility]  Phone 1: [phone]  Fax: [phone]    [facility] [address], [address] [place] [address]  [phone]  Tax ID: [id number]  NPI: [id number]    [facility] in the [facility] by Shin Mann for 2017  Network Utilization Review Department  Phone: [phone]; Fax [phone]  ATTENTION: The Network Utilization Review Department is now centralized for our 7 Facilities  Make a note that we have a new phone and fax numbers for our Department  Please call with any questions or concerns to [phone] and carefully follow the prompts so that you are directed to the right person  All voicemails are confidential  Fax any determinations, approvals, denials, and requests for initial or continue stay review clinical to [phone]  Due to HIGH CALL volume, it would be easier if you could please send faxed requests to expedite your requests and in part, help us provide discharge notifications faster

## 2018-06-18 LAB — PLACENTA IN STORAGE: NORMAL

## 2018-06-28 ENCOUNTER — ROUTINE PRENATAL (OUTPATIENT)
Dept: OBGYN CLINIC | Facility: CLINIC | Age: 23
End: 2018-06-28

## 2018-06-28 VITALS — SYSTOLIC BLOOD PRESSURE: 100 MMHG | WEIGHT: 169 LBS | BODY MASS INDEX: 34.13 KG/M2 | DIASTOLIC BLOOD PRESSURE: 60 MMHG

## 2018-06-28 DIAGNOSIS — Z98.891 STATUS POST C-SECTION: Primary | ICD-10-CM

## 2018-06-28 PROCEDURE — 99024 POSTOP FOLLOW-UP VISIT: CPT | Performed by: OBSTETRICS & GYNECOLOGY

## 2018-06-28 NOTE — PATIENT INSTRUCTIONS
Topic:  Postoperative incision check    Incision from  section completely healed and without any signs of inflammation infection or any other abnormalities    Baby followed at [facility] is an [place] for hemophilia    Patient will return in 4-5 weeks for her postpartum check    Patient will call for any problems issues or concerns that may arise during the interim

## 2018-06-28 NOTE — PROGRESS NOTES
Assessment/Plan:    Assessment:  Incision check    Plan:  Incision from  section has healed completely    There is no inflammation drainage or any other abnormalities noted upon examination    Baby is being followed at [facility] off here for hemophilia    Patient will be seen in 4-5 weeks for her postpartum visit    She was told to call should any problems issues or concerns arise during the interim         There are no diagnoses linked to this encounter  Subjective:     Patient ID: Neil Warner is a 21 y o  female  Patient is a 71-year-old female who presents today for incision check following  section delivery    Patient reports no problems with the incision    She denies any drainage or tenderness at this time    She still has some abdominal discomfort when ambulating and taking care of the  but this is relieved with Motrin or Advil    She has been reluctant to use any medications whatsoever but I assured her that it should be helpful for her if needed    Baby was diagnosed with hemophilia at [facility], [facility] is in [place] and is being followed carefully by the hematology team        Review of Systems   Constitutional: Negative  HENT: Negative  Eyes: Negative  Respiratory: Negative  Cardiovascular: Negative  Gastrointestinal: Negative  Endocrine: Negative  Genitourinary: Negative  Musculoskeletal: Negative  Skin: Negative  Neurological: Negative  Psychiatric/Behavioral: Negative  Objective:     Physical Exam   Constitutional: She is oriented to person, place, and time  She appears well-developed and well-nourished  HENT:   Head: Normocephalic  Eyes: Pupils are equal, round, and reactive to light  Neck: Normal range of motion  Pulmonary/Chest: Effort normal    Abdominal: Soft      section incision completely healed and without any signs of inflammation at all   Musculoskeletal: Normal range of motion  Neurological: She is alert and oriented to person, place, and time  Psychiatric: She has a normal mood and affect   Her behavior is normal  Thought content normal

## 2018-06-29 ENCOUNTER — TELEPHONE (OUTPATIENT)
Dept: HEMATOLOGY ONCOLOGY | Facility: HOSPITAL | Age: 23
End: 2018-06-29

## 2018-06-29 NOTE — TELEPHONE ENCOUNTER
Patient needs hospital follow up  She has no showed for 2 visits  Please schedule within the next 6 weeks

## 2018-06-29 NOTE — TELEPHONE ENCOUNTER
Called and LVM for patient to call our office and schedule an appt  Per DENISE Mccormack  She would like to see her within the next 6 weeks  Please schedule patient within that time frame with DENISE Mccormack

## 2018-07-02 NOTE — TELEPHONE ENCOUNTER
Called patient and LVM informing her that she needs to contact our office to schedule a F/U appt  This is the second attempt to contact the patient

## 2018-07-24 ENCOUNTER — TELEPHONE (OUTPATIENT)
Dept: HEMATOLOGY ONCOLOGY | Facility: CLINIC | Age: 23
End: 2018-07-24

## 2018-07-24 NOTE — TELEPHONE ENCOUNTER
Called and LVM informing patient to call our office to R/S the appt that she missed with our office

## 2018-07-25 ENCOUNTER — TELEPHONE (OUTPATIENT)
Dept: OTHER | Facility: HOSPITAL | Age: 23
End: 2018-07-25

## 2018-07-25 NOTE — TELEPHONE ENCOUNTER
Patient has no showed multiple times  Please send her a letter indicating she needs to be compliant with follow up with our office  She needs to reschedule appointment

## 2018-08-02 ENCOUNTER — TELEPHONE (OUTPATIENT)
Dept: OBGYN CLINIC | Facility: CLINIC | Age: 23
End: 2018-08-02

## 2018-08-02 NOTE — TELEPHONE ENCOUNTER
Left message for patient regarding missed appt and to call to reschedule post partum appt with Dr Jordi Joy

## 2018-08-06 ENCOUNTER — POSTPARTUM VISIT (OUTPATIENT)
Dept: OBGYN CLINIC | Facility: CLINIC | Age: 23
End: 2018-08-06

## 2018-08-06 VITALS — BODY MASS INDEX: 34.5 KG/M2 | SYSTOLIC BLOOD PRESSURE: 120 MMHG | WEIGHT: 170.8 LBS | DIASTOLIC BLOOD PRESSURE: 70 MMHG

## 2018-08-06 PROCEDURE — G0145 SCR C/V CYTO,THINLAYER,RESCR: HCPCS | Performed by: OBSTETRICS & GYNECOLOGY

## 2018-08-06 PROCEDURE — 99024 POSTOP FOLLOW-UP VISIT: CPT | Performed by: OBSTETRICS & GYNECOLOGY

## 2018-08-06 RX ORDER — MEDROXYPROGESTERONE ACETATE 150 MG/ML
150 INJECTION, SUSPENSION INTRAMUSCULAR
Qty: 1 ML | Refills: 4 | Status: SHIPPED | OUTPATIENT
Start: 2018-08-06 | End: 2019-03-01 | Stop reason: CLARIF

## 2018-08-06 NOTE — PROGRESS NOTES
OB POSTPARTUM VISIT PROGRESS NOTE  Date of Encounter: 2018    Ayo Brito    : 1995  (21 y o )  MR: [id number]    Faye Hall is in for her postpartum visit  She is now   She delivered by primary  section  She's generally doing well, denies current pain or bleeding issues, and has no significant depression issues  She is breast feeding exclusively  We discussed all appropriate contraceptive options and she chooses Depo-Provera    LABS:    Objective   EXAM:  GENERAL: alert, well appearing, and in no distress  VITALS: Blood pressure 120/70, weight 77 5 kg (170 lb 12 8 oz), currently breastfeeding  BMI: Body mass index is 34 5 kg/m²  NECK/THYROID: WNL  HEART: RRR  LUNGS: Clear to auscultation  BACK: Normal spine, no CVAT  BREASTS: Bilaterally nontender, no masses or nipple discharge  ABDOMEN: Regular  EXTREMITIES: All normal   PELVIC:   VULVA: Nml EGBUS  VAGINA: Normal, no discharge  Introitus well healed, slightly tender  CERVIX: Normal, no discharge  UTERUS: Anteverted, NSSC, Mobile, NT    RIGHT ADNEXUM: Nontender, no mass  LEFT ADNEXUM: Nontender, no mass  RECTAL: Deferred  Assessment/Plan   Diagnoses and all orders for this visit:    Encounter for postpartum visit  -     Liquid-based pap, screening  -     medroxyPROGESTERone (DEPO-PROVERA) 150 mg/mL injection;  Inject 1 mL (150 mg total) into a muscle every 3 (three) months        Hemanth Galicia MD

## 2018-08-07 ENCOUNTER — CLINICAL SUPPORT (OUTPATIENT)
Dept: OBGYN CLINIC | Facility: CLINIC | Age: 23
End: 2018-08-07
Payer: COMMERCIAL

## 2018-08-07 VITALS — DIASTOLIC BLOOD PRESSURE: 70 MMHG | SYSTOLIC BLOOD PRESSURE: 100 MMHG | BODY MASS INDEX: 34.42 KG/M2 | WEIGHT: 170.4 LBS

## 2018-08-07 DIAGNOSIS — Z30.42 ENCOUNTER FOR SURVEILLANCE OF INJECTABLE CONTRACEPTIVE: Primary | ICD-10-CM

## 2018-08-07 LAB — SL AMB POCT URINE HCG: NORMAL

## 2018-08-07 PROCEDURE — 81025 URINE PREGNANCY TEST: CPT

## 2018-08-07 PROCEDURE — 96372 THER/PROPH/DIAG INJ SC/IM: CPT

## 2018-08-07 RX ORDER — MEDROXYPROGESTERONE ACETATE 150 MG/ML
150 INJECTION, SUSPENSION INTRAMUSCULAR ONCE
Status: COMPLETED | OUTPATIENT
Start: 2018-08-07 | End: 2018-08-07

## 2018-08-07 RX ADMIN — MEDROXYPROGESTERONE ACETATE 150 MG: 150 INJECTION, SUSPENSION INTRAMUSCULAR at 13:40

## 2018-08-09 LAB
LAB AP GYN PRIMARY INTERPRETATION: NORMAL
Lab: NORMAL

## 2018-09-05 ENCOUNTER — OFFICE VISIT (OUTPATIENT)
Dept: OBGYN CLINIC | Facility: CLINIC | Age: 23
End: 2018-09-05
Payer: COMMERCIAL

## 2018-09-05 VITALS
WEIGHT: 169 LBS | HEIGHT: 59 IN | DIASTOLIC BLOOD PRESSURE: 70 MMHG | SYSTOLIC BLOOD PRESSURE: 110 MMHG | BODY MASS INDEX: 34.07 KG/M2

## 2018-09-05 DIAGNOSIS — N92.6 IRREGULAR MENSES: Primary | ICD-10-CM

## 2018-09-05 DIAGNOSIS — N92.1 MENORRHAGIA WITH IRREGULAR CYCLE: ICD-10-CM

## 2018-09-05 PROCEDURE — 99213 OFFICE O/P EST LOW 20 MIN: CPT | Performed by: PHYSICIAN ASSISTANT

## 2018-09-05 RX ORDER — MEDROXYPROGESTERONE ACETATE 150 MG/ML
INJECTION, SUSPENSION INTRAMUSCULAR
COMMUNITY
Start: 2018-08-06 | End: 2019-02-08 | Stop reason: ALTCHOICE

## 2018-09-05 NOTE — PROGRESS NOTES
Assessment/Plan:    No problem-specific Assessment & Plan notes found for this encounter  Diagnoses and all orders for this visit:    Irregular menses  -     CBC and differential; Future    Menorrhagia with irregular cycle  -     CBC and differential; Future    Other orders  -     medroxyPROGESTERone acetate (DEPO-PROVERA SYRINGE) 150 mg/mL injection;         Counseled patient that we cannot use any combination contraception while she is still breastfeeding because it can affect milk supply  Entered order for CBC because of prolonged bleeding  Stressed the need to make f/u appointment with hematology  Explained that irregular bleeding is possible with any progesterone only contraceptive  Briefly discussed Mirena IUD  Per Dr January Knutson, will monitor patient for another 3 weeks  If she is still having bleeding, she thinks she would like to have IUD placed  Recommended f/u with lactation specialists for issues with milk supply  Call the office if symptoms worsen or change  F/u in 3 weeks  Time of visit 15 minutes; >50% spent counseling  Subjective:      Patient ID: Juan Carlos Douglas is a 21 y o  female  Patient is here with complaints of bleeding on Depo Provera injection  Had first dose on 8/7  Experienced some intermittent spotting for a week or two, then got a full period  Patient is having to change her pad frequently and has small clots  States this happened to her before when she was on the Depo  Has a history of Von Willebrand's disease  Has not had a chance to f/u with hematology since the baby was born  Complains of some nausea and occasional HA  No changes in bowel/bladder habits, cramping, pelvic pain, abdominal pain, and change in appetite  Patient is currently breastfeeding          The following portions of the patient's history were reviewed and updated as appropriate: allergies, current medications, past family history, past medical history, past social history, past surgical history and problem list     Review of Systems   Constitutional: Negative for appetite change  Gastrointestinal: Positive for nausea  Negative for abdominal distention, abdominal pain, constipation, diarrhea and vomiting  Genitourinary: Positive for menstrual problem (Irregular, heavy bleeding)  Negative for difficulty urinating, dysuria, frequency, hematuria, pelvic pain, urgency, vaginal bleeding, vaginal discharge and vaginal pain  Objective:      /70   Ht 4' 11" (1 499 m)   Wt 76 7 kg (169 lb)   LMP 08/14/2018   Breastfeeding? No   BMI 34 13 kg/m²          Physical Exam   Constitutional: She is oriented to person, place, and time  Vital signs are normal  She appears well-developed and well-nourished  Neurological: She is alert and oriented to person, place, and time  Psychiatric: She has a normal mood and affect  Her behavior is normal  Judgment and thought content normal    Vitals reviewed

## 2018-09-11 ENCOUNTER — TELEPHONE (OUTPATIENT)
Dept: POSTPARTUM | Facility: CLINIC | Age: 23
End: 2018-09-11

## 2018-09-11 NOTE — TELEPHONE ENCOUNTER
[address] calls because she is nursing and notices that her milk supply seems low  She went back to work two weeks ago and is unable to express much milk as her baby is being fed while she is away from him  She is 3 months postpartum   Does she feel that the baby is not satisfied at the breast? No He is quite content after nursing  Does she need to supplement the baby after nursing? No   If yes to the above answers, the baby needs to be seen by the baby's primary care provider for a weight check  If the answers are no, there are many reasons for feeling as if the milk supply is low:   Is the baby nursing well, and gaining weight well? Yes   If yes, it is possible that she is less engorged than she used to be, but her supply might still be fine  Advise that the baby should be weighed to make sure the baby is gaining weight  If not known, the baby should be seen by the office nurse for a weight check  Has she been  from the baby, due to illness, work or other reasons? Yes   If yes, Advise   x She needs to express her breasts at least every 2-3 hours while away from the baby, otherwise her supply may drop  She is currently doing this and expressing 3-4 ounces each session  x The pump does not maintain her supply as well as the baby does when nursing  x Manually express her breasts in addition to using the breast pump  She can learn how to do this by visiting the following website at [facility]: [email or web address]  html   x If she is not sure that her pump is doing a good job, she should be seen by a lactation consultant for a pump evaluation  Does the baby sleep all night, so that mom is going more than 7 hours at night without nursing or pumping? No   If yes, going too long at night may drop her supply  She could consider waking up after 5-7 hours to pump, or wake her baby up at that point to nurse     Has she taken any medication that could have dropped her supply, such as 2 or more alcoholic drinks at a time, narcotics (such as hydrocodone, oxycodone), tobacco use, estrogen-containing birth control pills, depot medroxyprogesterone acetate, or decongestants (such as pseudoephedrine)? No   If yes, advise that she should avoid use of these substances in the future if possible, and try to nurse or pump more often to increase her supply  Has she been supplementing the baby with formula at times instead of nursing? Yes  Baby gets two bottles of formula each day at the advise of his hematologist to supplement his iron intake   If yes, advise:   x Supplementation will cause a lowering of milk supply because supplementation will cause the baby to nurse less often   She should try to put the baby to the breast rather than giving a bottle of formula   Only supplement if the baby still seems hungry after nursing well on both breasts   If needing to supplement to satisfy the baby, baby and mom should be seen by a lactation consultant or knowledgeable provider  Has the baby been ill or not nursing well? No   If yes, advise that if the baby is not draining the breast well, her supply will drop  She may need to pump after feeding to increase her supply  Have her see a lactation consultant or a knowledgeable provider  Has mom gotten her menses back yet? Yes/No   If yes, it is normal for women to feel that the milk supply is lower before and during the menses  If none of the above scenarios helps the mother, please direct her to call a lactation consultant or a knowledgeable provider  I encouraged Don Mandujano to watch the [employer] paced bottle feeding video and share it with her baby's caregivers as well  Marni verbalized understanding and will call back with any additional questions or concerns

## 2018-09-18 ENCOUNTER — APPOINTMENT (OUTPATIENT)
Dept: LAB | Facility: HOSPITAL | Age: 23
End: 2018-09-18
Payer: COMMERCIAL

## 2018-09-18 DIAGNOSIS — N92.1 MENORRHAGIA WITH IRREGULAR CYCLE: ICD-10-CM

## 2018-09-18 DIAGNOSIS — N92.6 IRREGULAR MENSES: ICD-10-CM

## 2018-09-18 LAB
BASOPHILS # BLD AUTO: 0.03 THOUSANDS/ΜL (ref 0–0.1)
BASOPHILS NFR BLD AUTO: 0 % (ref 0–1)
EOSINOPHIL # BLD AUTO: 0.18 THOUSAND/ΜL (ref 0–0.61)
EOSINOPHIL NFR BLD AUTO: 2 % (ref 0–6)
ERYTHROCYTE [DISTWIDTH] IN BLOOD BY AUTOMATED COUNT: 15.2 % (ref 11.6–15.1)
HCT VFR BLD AUTO: 40.7 % (ref 34.8–46.1)
HGB BLD-MCNC: 12.8 G/DL (ref 11.5–15.4)
IMM GRANULOCYTES # BLD AUTO: 0.02 THOUSAND/UL (ref 0–0.2)
IMM GRANULOCYTES NFR BLD AUTO: 0 % (ref 0–2)
LYMPHOCYTES # BLD AUTO: 3.3 THOUSANDS/ΜL (ref 0.6–4.47)
LYMPHOCYTES NFR BLD AUTO: 39 % (ref 14–44)
MCH RBC QN AUTO: 26.4 PG (ref 26.8–34.3)
MCHC RBC AUTO-ENTMCNC: 31.4 G/DL (ref 31.4–37.4)
MCV RBC AUTO: 84 FL (ref 82–98)
MONOCYTES # BLD AUTO: 0.57 THOUSAND/ΜL (ref 0.17–1.22)
MONOCYTES NFR BLD AUTO: 7 % (ref 4–12)
NEUTROPHILS # BLD AUTO: 4.42 THOUSANDS/ΜL (ref 1.85–7.62)
NEUTS SEG NFR BLD AUTO: 52 % (ref 43–75)
NRBC BLD AUTO-RTO: 0 /100 WBCS
PLATELET # BLD AUTO: 261 THOUSANDS/UL (ref 149–390)
PMV BLD AUTO: 9.7 FL (ref 8.9–12.7)
RBC # BLD AUTO: 4.85 MILLION/UL (ref 3.81–5.12)
WBC # BLD AUTO: 8.52 THOUSAND/UL (ref 4.31–10.16)

## 2018-09-18 PROCEDURE — 85025 COMPLETE CBC W/AUTO DIFF WBC: CPT

## 2018-09-18 PROCEDURE — 36415 COLL VENOUS BLD VENIPUNCTURE: CPT

## 2018-09-28 ENCOUNTER — TELEPHONE (OUTPATIENT)
Dept: OBGYN CLINIC | Facility: CLINIC | Age: 23
End: 2018-09-28

## 2018-09-28 NOTE — TELEPHONE ENCOUNTER
Spoke with patient regarding CBC  H/H WNL  Patient states she is still bleeding, but it is much lighter  Seems to be improving  States she has an appointment on 10/23 for second dose of Depo Provera  Will try one more dose  If bleeding continues, will switch to IUD  Patient to call right away if bleeding becomes heavier, or new symptoms develop

## 2018-10-19 ENCOUNTER — CLINICAL SUPPORT (OUTPATIENT)
Dept: OBGYN CLINIC | Facility: CLINIC | Age: 23
End: 2018-10-19
Payer: COMMERCIAL

## 2018-10-19 VITALS — SYSTOLIC BLOOD PRESSURE: 100 MMHG | WEIGHT: 178 LBS | BODY MASS INDEX: 35.95 KG/M2 | DIASTOLIC BLOOD PRESSURE: 60 MMHG

## 2018-10-19 DIAGNOSIS — Z30.42 ENCOUNTER FOR SURVEILLANCE OF INJECTABLE CONTRACEPTIVE: ICD-10-CM

## 2018-10-19 PROCEDURE — 96372 THER/PROPH/DIAG INJ SC/IM: CPT | Performed by: OBSTETRICS & GYNECOLOGY

## 2018-10-19 RX ORDER — MEDROXYPROGESTERONE ACETATE 150 MG/ML
150 INJECTION, SUSPENSION INTRAMUSCULAR ONCE
Status: COMPLETED | OUTPATIENT
Start: 2018-10-19 | End: 2018-10-19

## 2018-10-19 RX ADMIN — MEDROXYPROGESTERONE ACETATE 150 MG: 150 INJECTION, SUSPENSION INTRAMUSCULAR at 17:06

## 2018-10-26 ENCOUNTER — HOSPITAL ENCOUNTER (EMERGENCY)
Facility: HOSPITAL | Age: 23
Discharge: HOME | End: 2018-10-26
Attending: EMERGENCY MEDICINE | Admitting: EMERGENCY MEDICINE
Payer: COMMERCIAL

## 2018-10-26 VITALS
OXYGEN SATURATION: 98 % | WEIGHT: 170 LBS | BODY MASS INDEX: 34.34 KG/M2 | HEART RATE: 100 BPM | TEMPERATURE: 97.9 F | RESPIRATION RATE: 18 BRPM | SYSTOLIC BLOOD PRESSURE: 164 MMHG | DIASTOLIC BLOOD PRESSURE: 90 MMHG

## 2018-10-26 DIAGNOSIS — H92.02 OTALGIA OF LEFT EAR: Primary | ICD-10-CM

## 2018-10-26 DIAGNOSIS — M54.2 MUSCLE PAIN, CERVICAL: ICD-10-CM

## 2018-10-26 PROCEDURE — 99282 EMERGENCY DEPT VISIT SF MDM: CPT

## 2018-10-26 RX ORDER — BUPIVACAINE HYDROCHLORIDE 2.5 MG/ML
10 INJECTION, SOLUTION EPIDURAL; INFILTRATION; INTRACAUDAL ONCE
Status: DISCONTINUED | OUTPATIENT
Start: 2018-10-26 | End: 2018-10-26

## 2018-10-26 RX ORDER — CIPROFLOXACIN AND DEXAMETHASONE 3; 1 MG/ML; MG/ML
4 SUSPENSION/ DROPS AURICULAR (OTIC) 2 TIMES DAILY
Status: DISCONTINUED | OUTPATIENT
Start: 2018-10-26 | End: 2018-10-26 | Stop reason: HOSPADM

## 2018-10-26 RX ORDER — BUPIVACAINE HYDROCHLORIDE 5 MG/ML
10 INJECTION, SOLUTION EPIDURAL; INTRACAUDAL ONCE
Status: COMPLETED | OUTPATIENT
Start: 2018-10-26 | End: 2018-10-26

## 2018-10-26 RX ADMIN — BUPIVACAINE HYDROCHLORIDE 10 ML: 5 INJECTION, SOLUTION EPIDURAL; INTRACAUDAL; PERINEURAL at 20:18

## 2018-10-26 RX ADMIN — CIPROFLOXACIN AND DEXAMETHASONE 4 DROP: 3; 1 SUSPENSION/ DROPS AURICULAR (OTIC) at 20:14

## 2018-10-26 NOTE — ED NOTES
Spoke with pharmacy  Bupivacane   25% not stocked  Contacting physician to change order   Will tube up medications shortly     Juan David Dailey RN  10/26/18 1954

## 2018-10-26 NOTE — ED ATTENDING ATTESTATION
Esperanza Montanez DO, saw and evaluated the patient  I have discussed the patient with the resident/non-physician practitioner and agree with the resident's/non-physician practitioner's findings, Plan of Care, and MDM as documented in the resident's/non-physician practitioner's note, except where noted  All available labs and Radiology studies were reviewed  At this point I agree with the current assessment done in the Emergency Department  I have conducted an independent evaluation of this patient including a focused history and a physical exam     ED Note - Caterina Obrien 21 y o  female MRN: [id number]  Unit/Bed#: [facility] Encounter: [id number]    History of Present Illness   HPI  Caterina Obrien is a 21 y o  female who presents for evaluation of left ear pain  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 and was started on [facility]  Patient states that she has had similar pain in the past, last being her right ear  No recent swimming or submersion  No fever chills  No dysphagia or odynophagia  The pain is localized to the left ear canal as well as the left temporalis, suboccipital, cervical paraspinal and trapezius musculature  Patient notes pain upon insertion of ear speculum  She does state that there is some hearing deficit due to the swelling  No headache or focal neurological deficits  NSAIDs without relief  REVIEW OF SYSTEMS  See HPI for further details  12 systems reviewed and otherwise negative except as noted     Historical Information     PAST MEDICAL HISTORY  Past Medical History:   Diagnosis Date    Migraine     Placenta previa antepartum in first trimester     Varicella     As a child    Von Willebrand disease ([facility] [address] )        FAMILY HISTORY  Family History   Problem Relation Age of Onset    Diabetes Paternal Grandmother     Hypertension Paternal Grandmother     Diabetes Paternal Grandfather     No Known Problems Mother     No Known Problems Father     No Known Problems Sister  No Known Problems Brother        SOCIAL HISTORY  Social History     Social History    Marital status: Single     Spouse name: N/A    Number of children: N/A    Years of education: N/A     Social History Main Topics    Smoking status: Never Smoker    Smokeless tobacco: Never Used    Alcohol use No    Drug use: No    Sexual activity: Yes     Other Topics Concern    None     Social History Narrative    None       SURGICAL HISTORY  Past Surgical History:   Procedure Laterality Date    CHOLECYSTECTOMY      2017    GALLBLADDER SURGERY      removal- resolved    PA  DELIVERY ONLY N/A 2018    Procedure:  SECTION (); Surgeon: Princess Dee MD;  Location: [facility];  Service: Obstetrics    TONSILLECTOMY      in      Meds/Allergies     CURRENT MEDICATIONS    Current Facility-Administered Medications:     bupivacaine (PF) (MARCAINE) 0 25 % injection 10 mL, 10 mL, Infiltration, Once, Jen Haney MD    ciprofloxacin-dexamethasone (CIPRODEX) 0 3-0 1 % otic suspension 4 drop, 4 drop, Left Ear, BID, Jen Haney MD    Current Outpatient Prescriptions:     acetaminophen (TYLENOL) 325 mg tablet, Take 1-2 tablets every 4-6 hours as needed  , Disp: 30 tablet, Rfl: 0    docusate sodium (COLACE) 100 mg capsule, Take 1 capsule (100 mg total) by mouth 2 (two) times a day (Patient not taking: Reported on 2018 ), Disp: 10 capsule, Rfl: 0    ibuprofen (MOTRIN) 600 mg tablet, Take 1 tablet (600 mg total) by mouth every 6 (six) hours as needed for mild pain (Patient not taking: Reported on 2018 ), Disp: 30 tablet, Rfl: 0    medroxyPROGESTERone (DEPO-PROVERA) 150 mg/mL injection, Inject 1 mL (150 mg total) into a muscle every 3 (three) months (Patient not taking: Reported on 2018 ), Disp: 1 mL, Rfl: 4    medroxyPROGESTERone acetate (DEPO-PROVERA SYRINGE) 150 mg/mL injection, , Disp: , Rfl:     Prenatal Multivit-Min-Fe-FA (PRE- PO), Take by mouth, Disp: , Rfl:     (Not in a hospital admission)    ALLERGIES  Allergies   Allergen Reactions    Aspirin      Von-willebrand disorder      Objective     PHYSICAL EXAM    VITAL SIGNS: Blood pressure 164/90, pulse 100, temperature 97 9 °F (36 6 °C), temperature source Tympanic, resp  rate 18, weight 77 1 kg (170 lb), last menstrual period 10/10/2018, SpO2 98 %, not currently breastfeeding  Constitutional:  Appears well developed and well nourished, no acute distress, non-toxic appearance   Eyes:  PERRL, EOMI, conjunctivae pink, sclerae non-icteric, no nystagmus  HENT:  Normocephalic/Atraumatic, no rhinorrhea, mucous membranes moist,Tympanic Membranes clear, note swelling and tenderness to left EAM, no pharyngeal/tonsillar exudates or erythema, no oropharyngeal or tonsillar asymmetry  No swelling or tenderness to the left mastoid region  Tenderness noted to the left temporalis and suboccipital muscles  Neck: normal range of motion, tenderness noted to the left cervical paraspinal and trapezius muscles with areas of spasm , supple   Respiratory:  No respiratory distress, normal breath sounds   Cardiovascular:  Normal rate, normal rhythm  GI:  Soft, non-tender, non-distended  :  No CVAT, no flank ecchymosis  Musculoskeletal:  No swelling or edema, no tenderness, no deformities  Integument:  Pink, warm, dry, Well hydrated, no rash, no erythema, no bullae   Lymphatic:  No cervical/ tonsillar/ submandibular lymphadenopathy noted   Neurologic:  Awake, Alert & oriented x 3, CN 2-12 intact, no focal neurological deficits  Psychiatric:  Speech and behavior appropriate       ED COURSE and MDM:    Assessment/Plan   Assessment:  Chelsea Garrison is a 21 y o  female presents for evaluation of left ear pain  Plan:  Symptom management, ENT follow-up  CRITICAL CARE TIME: 0 minutes      Portions of the record may have been created with voice recognition software   Occasional wrong word or "sound a like" substitutions may have occurred due to the inherent limitations of voice recognition software       ED Provider  Electronically Signed by

## 2018-10-27 NOTE — DISCHARGE INSTRUCTIONS
Earache   WHAT YOU NEED TO KNOW:   An earache can be caused by a problem within your ear or from another body area  Common causes include earwax buildup, objects in your ear, injury, infections, or jaw or dental problems  Less often, earaches may be caused by arthritis in your upper spine  DISCHARGE INSTRUCTIONS:   Return to the emergency department if:   · You have a severe earache  · You have ear pain with itching, hearing loss, dizziness, a feeling of fullness in your ear, or ringing in your ears  Contact your healthcare provider if:   · Your ear pain worsens or does not go away with treatment  · You have drainage from your ear  · You have a fever  · Your outer ear becomes red, swollen, and warm  · You have questions or concerns about your condition or care  Medicines: You may need any of the following:  · NSAIDs , such as ibuprofen, help decrease swelling, pain, and fever  This medicine is available with or without a doctor's order  NSAIDs can cause stomach bleeding or kidney problems in certain people  If you take blood thinner medicine, always ask if NSAIDs are safe for you  Always read the medicine label and follow directions  Do not give these medicines to children under 10months of age without direction from your child's healthcare provider  · Acetaminophen  decreases pain and fever  It is available without a doctor's order  Ask how much to take and how often to take it  Follow directions  Acetaminophen can cause liver damage if not taken correctly  · Do not give aspirin to children under 25years of age  Your child could develop Reye syndrome if he takes aspirin  Reye syndrome can cause life-threatening brain and liver damage  Check your child's medicine labels for aspirin, salicylates, or oil of wintergreen  · Take your medicine as directed  Call your healthcare provider if you think your medicine is not helping or if you have side effects   Tell him if you are allergic to any medicine  Keep a list of the medicines, vitamins, and herbs you take  Include the amounts, and when and why you take them  Bring the list or the pill bottles to follow-up visits  Carry your medicine list with you in case of an emergency  Follow up with your healthcare provider as directed:  Write down your questions so you remember to ask them during your visits  © 2017 [address] [employer] Information is for End User's use only and may not be sold, redistributed or otherwise used for commercial purposes  All illustrations and images included in CareNotes® are the copyrighted property of A D A M , Inc  or [employer]  The above information is an  only  It is not intended as medical advice for individual conditions or treatments  Talk to your doctor, nurse or pharmacist before following any medical regimen to see if it is safe and effective for you  Antibiotic/Anti-inflammatory Combinations (Into the ear)   Treats infections of the ear canal, minor swelling of the ear, and other ear problems  Brand Name(s): Acetasol HC, Cipro HC, Ciprodex, Coly-Mycin S, Cortisporin, Cortisporin TC, Otovel   There may be other brand names for this medicine  When This Medicine Should Not Be Used:   Tell your doctor if you are allergic to any antibiotic medicines or to steroids such as cortisone, hydrocortisone, or prednisone  How to Use This Medicine:   Liquid, Drop  · Your doctor will tell you how much to use and how often  · Wash your hands  · Warm the drops by holding the bottle in your hands for a few minutes  · Shake the suspension well before using  · Never touch the dropper to your ear or anything else  · Lie down or tilt your head to the side  · Drop the prescribed number of drops into the ear  · Keep the ear tilted up for a few minutes or insert a cotton plug into the ear  · Wash your hands again so as not to spread the infection    If a dose is missed:   · Use as soon as possible except when it is close to the time for your next dose  · If almost time for next dose, skip the missed dose and return to your regular dosing schedule  · You should not use two doses at the same time  How to Store and Dispose of This Medicine:   · Store in a tightly closed container at room temperature and away from direct light  · Do not freeze  · Keep all medicine out of the reach of children  Drugs and Foods to Avoid:      Ask your doctor or pharmacist before using any other medicine, including over-the-counter medicines, vitamins, and herbal products  Warnings While Using This Medicine:   · If you are pregnant or breastfeeding, talk to your doctor before using this medicine  · Make sure your doctor knows if you have a history of ear problems or a perforated ear drum  · If you have herpes simplex or chicken pox, talk to your doctor before using this medicine  · If your symptoms do not get better in one week or become worse, call your doctor  Possible Side Effects While Using This Medicine:   Call your doctor right away if you notice any of these side effects:  · Skin rash, redness, swelling or intense itching that develops after you start using this medicine  If you notice these less serious side effects, talk with your doctor:   · Stinging or burning of the ear  If you notice other side effects that you think are caused by this medicine, tell your doctor  Call your doctor for medical advice about side effects  You may report side effects to FDA at 7-845-FDA-9996  © 2017 [address] [employer] Information is for End User's use only and may not be sold, redistributed or otherwise used for commercial purposes  The above information is an  only  It is not intended as medical advice for individual conditions or treatments  Talk to your doctor, nurse or pharmacist before following any medical regimen to see if it is safe and effective for you

## 2018-10-27 NOTE — ED PROVIDER NOTES
History  Chief Complaint   Patient presents with    Earache     Pt has c/o L ear pain  21year-old woman presents for evaluation of left ear pain  She reports a 2-week history of progressive pain and decreased hearing  No drainage, fevers, or neck stiffness  She was evaluated by ENT and was prescribed fluticasone for suspected eustachian tube dysfunction vs TMJ dysfunction  She has a history of chronic ear pain  No recent swimming or trauma  On arrival, she is afebrile with a heart rate of 100 and otherwise normal vital signs  Physical exam shows inflammation of the external canal on the left without discharge and no middle ear effusion  No mastoid involvement or signs of meningismus  Will insert ear wick and provide Ciprodex  Will have her follow up with ENT for further management  Prior to Admission Medications   Prescriptions Last Dose Informant Patient Reported? Taking? Prenatal Multivit-Min-Fe-FA (PRE-TANYA PO)  Self Yes No   Sig: Take by mouth   acetaminophen (TYLENOL) 325 mg tablet   No No   Sig: Take 1-2 tablets every 4-6 hours as needed     docusate sodium (COLACE) 100 mg capsule   No No   Sig: Take 1 capsule (100 mg total) by mouth 2 (two) times a day   Patient not taking: Reported on 2018    ibuprofen (MOTRIN) 600 mg tablet   No No   Sig: Take 1 tablet (600 mg total) by mouth every 6 (six) hours as needed for mild pain   Patient not taking: Reported on 2018    medroxyPROGESTERone (DEPO-PROVERA) 150 mg/mL injection   No No   Sig: Inject 1 mL (150 mg total) into a muscle every 3 (three) months   Patient not taking: Reported on 2018    medroxyPROGESTERone acetate (DEPO-PROVERA SYRINGE) 150 mg/mL injection   Yes No      Facility-Administered Medications: None       Past Medical History:   Diagnosis Date    Migraine     Placenta previa antepartum in first trimester     Varicella     As a child    Von Willebrand disease ([facility] [address] )        Past Surgical History:   Procedure Laterality Date    CHOLECYSTECTOMY      2017    GALLBLADDER SURGERY      removal- resolved    ME  DELIVERY ONLY N/A 2018    Procedure:  SECTION (); Surgeon: Ivonne Gutierrez MD;  Location: [facility] ;  Service: Obstetrics    TONSILLECTOMY      in        Family History   Problem Relation Age of Onset    Diabetes Paternal Grandmother     Hypertension Paternal Grandmother     Diabetes Paternal Grandfather     No Known Problems Mother     No Known Problems Father     No Known Problems Sister     No Known Problems Brother      I have reviewed and agree with the history as documented  Social History   Substance Use Topics    Smoking status: Never Smoker    Smokeless tobacco: Never Used    Alcohol use No        Review of Systems   Constitutional: Negative for chills and fever  HENT: Positive for ear pain  Negative for ear discharge, rhinorrhea, sinus pain, sinus pressure and sore throat  Eyes: Negative for photophobia and visual disturbance  Respiratory: Negative for cough and shortness of breath  Cardiovascular: Negative for chest pain and leg swelling  Gastrointestinal: Negative for abdominal pain, diarrhea, nausea and vomiting  Genitourinary: Negative for dysuria, frequency and hematuria  Musculoskeletal: Positive for neck pain  Negative for back pain and neck stiffness  Skin: Negative for rash and wound  Neurological: Negative for light-headedness and headaches         Physical Exam  ED Triage Vitals [10/26/18 175]   Temperature Pulse Respirations Blood Pressure SpO2   97 9 °F (36 6 °C) 100 18 164/90 98 %      Temp Source Heart Rate Source Patient Position - Orthostatic VS BP Location FiO2 (%)   Tympanic Monitor Sitting Left arm --      Pain Score       Worst Possible Pain           Orthostatic Vital Signs  Vitals:    10/26/18 1758   BP: 164/90   Pulse: 100   Patient Position - Orthostatic VS: Sitting       Physical Exam   Constitutional: She is oriented to person, place, and time  She appears well-developed and well-nourished  No distress  HENT:   Head: Normocephalic and atraumatic  Inflammation of the external canal on the left without discharge and no middle ear effusion, no mastoid involvement or signs of meningismus   Eyes: Pupils are equal, round, and reactive to light  Conjunctivae are normal  No scleral icterus  Neck: Neck supple  No JVD present  Tenderness to left cervical paraspinal muscles and trapezius   Cardiovascular: Normal rate, regular rhythm and normal heart sounds  Exam reveals no gallop and no friction rub  No murmur heard  Pulmonary/Chest: Effort normal and breath sounds normal  No respiratory distress  She has no wheezes  She has no rales  Abdominal: Soft  She exhibits no distension  There is no tenderness  There is no rebound and no guarding  Musculoskeletal: She exhibits no edema or tenderness  Neurological: She is alert and oriented to person, place, and time  Skin: Skin is warm and dry  She is not diaphoretic  Psychiatric: She has a normal mood and affect  Her behavior is normal  Thought content normal    Vitals reviewed  ED Medications  Medications   bupivacaine (PF) (MARCAINE) 0 5 % injection 10 mL (10 mL Infiltration Given by Other 10/26/18 2018)       Diagnostic Studies  Results Reviewed     None                 No orders to display         Procedures  Procedures      Phone Consults  ED Phone Contact    ED Course  ED Course as of Oct 27 1603   Fri Oct 26, 2018   1930 Nursing called down to pharmacy multiple times to request medications  MDM  Number of Diagnoses or Management Options  Muscle pain, cervical:   Otalgia of left ear:   Diagnosis management comments: Ear wick was inserted and patient was given Ciprodex for possible otitis externa  Multiple IM bupivicaine injections were administered at patient request for muscle spasms   No signs of systemic infection or mastoid involvement  Patient was discharge with outpatient ENT follow up  CritCare Time    Disposition  Final diagnoses:   Otalgia of left ear   Muscle pain, cervical     Time reflects when diagnosis was documented in both MDM as applicable and the Disposition within this note     Time User Action Codes Description Comment    10/26/2018  8:28 PM Bre Miller Add [H92 02] Otalgia of left ear     10/26/2018  8:28 PM Bre Miller Add [M54 2] Muscle pain, cervical       ED Disposition     ED Disposition Condition Comment    Discharge  [facility] discharge to home/self care  Condition at discharge: Good        Follow-up Information     Follow up With Specialties Details Why Contact Info Additional [address] [facility]  Schedule an appointment as soon as possible for a visit  [address] [facility]  [address] [facility] Emergency Department Emergency Medicine  As needed [address] [id number]  [phone]  ED, [address], [place],           Discharge Medication List as of 10/26/2018  8:29 PM      CONTINUE these medications which have NOT CHANGED    Details   acetaminophen (TYLENOL) 325 mg tablet Take 1-2 tablets every 4-6 hours as needed  , Print      docusate sodium (COLACE) 100 mg capsule Take 1 capsule (100 mg total) by mouth 2 (two) times a day, Starting 2018, Print      ibuprofen (MOTRIN) 600 mg tablet Take 1 tablet (600 mg total) by mouth every 6 (six) hours as needed for mild pain, Starting 2018, Print      medroxyPROGESTERone (DEPO-PROVERA) 150 mg/mL injection Inject 1 mL (150 mg total) into a muscle every 3 (three) months, Starting 2018, Normal      medroxyPROGESTERone acetate (DEPO-PROVERA SYRINGE) 150 mg/mL injection Starting 2018, Historical Med      Prenatal Multivit-Min-Fe-FA (PRE- PO) Take by mouth, Historical Med No discharge procedures on file  ED Provider  Attending physically available and evaluated [facility]  I managed the patient along with the ED Attending      Electronically Signed by         Balwinder Toro MD  10/27/18 7819

## 2019-01-10 ENCOUNTER — CLINICAL SUPPORT (OUTPATIENT)
Dept: OBGYN CLINIC | Facility: CLINIC | Age: 24
End: 2019-01-10
Payer: COMMERCIAL

## 2019-01-10 VITALS
BODY MASS INDEX: 36.89 KG/M2 | WEIGHT: 183 LBS | HEIGHT: 59 IN | SYSTOLIC BLOOD PRESSURE: 110 MMHG | DIASTOLIC BLOOD PRESSURE: 70 MMHG

## 2019-01-10 DIAGNOSIS — Z30.42 ENCOUNTER FOR DEPO-PROVERA CONTRACEPTION: Primary | ICD-10-CM

## 2019-01-10 PROCEDURE — 96372 THER/PROPH/DIAG INJ SC/IM: CPT

## 2019-01-10 RX ORDER — MEDROXYPROGESTERONE ACETATE 150 MG/ML
150 INJECTION, SUSPENSION INTRAMUSCULAR ONCE
Status: COMPLETED | OUTPATIENT
Start: 2019-01-10 | End: 2019-01-10

## 2019-01-10 RX ADMIN — MEDROXYPROGESTERONE ACETATE 150 MG: 150 INJECTION, SUSPENSION INTRAMUSCULAR at 17:00

## 2019-02-08 ENCOUNTER — OFFICE VISIT (OUTPATIENT)
Dept: FAMILY MEDICINE CLINIC | Facility: CLINIC | Age: 24
End: 2019-02-08
Payer: COMMERCIAL

## 2019-02-08 VITALS
DIASTOLIC BLOOD PRESSURE: 72 MMHG | WEIGHT: 181 LBS | BODY MASS INDEX: 36.49 KG/M2 | TEMPERATURE: 98.7 F | RESPIRATION RATE: 16 BRPM | HEIGHT: 59 IN | SYSTOLIC BLOOD PRESSURE: 110 MMHG | OXYGEN SATURATION: 98 % | HEART RATE: 103 BPM

## 2019-02-08 DIAGNOSIS — R63.5 WEIGHT GAIN: Chronic | ICD-10-CM

## 2019-02-08 DIAGNOSIS — D68.0 VON WILLEBRAND DISEASE (HCC): Chronic | ICD-10-CM

## 2019-02-08 DIAGNOSIS — Z00.00 WELLNESS EXAMINATION: Primary | ICD-10-CM

## 2019-02-08 DIAGNOSIS — G43.709 CHRONIC MIGRAINE WITHOUT AURA WITHOUT STATUS MIGRAINOSUS, NOT INTRACTABLE: Chronic | ICD-10-CM

## 2019-02-08 DIAGNOSIS — Z13.1 SCREENING FOR DIABETES MELLITUS: ICD-10-CM

## 2019-02-08 PROCEDURE — 99385 PREV VISIT NEW AGE 18-39: CPT | Performed by: FAMILY MEDICINE

## 2019-02-08 NOTE — PROGRESS NOTES
Assessment/Plan:    No problem-specific Assessment & Plan notes found for this encounter  Diagnoses and all orders for this visit:    Wellness examination  Comments:  Pt is healthy on exam   She is to continue a lower carb diet, and regular exercise  Screening for diabetes mellitus  Comments:  FBW ordered for the pt  Orders:  -     Comprehensive metabolic panel; Future  -     Lipid Panel with Direct LDL reflex; Future  -     HEMOGLOBIN A1C W/ EAG ESTIMATION; Future    Von Willebrand disease ([facility] [address] )  Comments:  Stable; pt has seen Heme/Onc  Orders:  -     CBC and differential; Future    Chronic migraine without aura without status migrainosus, not intractable  Comments:  Hx of; pt referred locally to Neurology  Orders:  -     Ambulatory referral to Neurology; Future    Weight gain  Comments:  Diet and exercise as above  TSH included with FBW ordered  Orders:  -     TSH, 3rd generation with Free T4 reflex; Future          Subjective:      Patient ID: Ronni Carrasco is a 21 y o  female  New pt to clinic today  Pt works for [employer]  Struggling losing weight; has cut back on carbs in her diet  PAP smear is UTD  Is contemplating change in her bitrth control  The following portions of the patient's history were reviewed and updated as appropriate: allergies, current medications, past family history, past social history, past surgical history and problem list     Past Medical History:   Diagnosis Date    Migraine     Placenta previa antepartum in first trimester     Varicella     As a child    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[facility] )      Past Surgical History:   Procedure Laterality Date    CHOLECYSTECTOMY      2017    GALLBLADDER SURGERY      removal- resolved    [place]  DELIVERY ONLY N/A 2018    Procedure:  SECTION ();   Surgeon: Crissy Garcia MD;  Location: [facility];  Service: Obstetrics    TONSILLECTOMY      in        Current Outpatient Prescriptions:    medroxyPROGESTERone (DEPO-PROVERA) 150 mg/mL injection, Inject 1 mL (150 mg total) into a muscle every 3 (three) months, Disp: 1 mL, Rfl: 4    Allergies   Allergen Reactions    Aspirin      Von-willebrand disorder      Family History   Problem Relation Age of Onset    Diabetes Paternal Grandmother     Hypertension Paternal Grandmother     Diabetes Paternal Grandfather     No Known Problems Mother     No Known Problems Father     No Known Problems Sister     No Known Problems Brother    Hemophilia Type A - Son  Social History   Substance Use Topics    Smoking status: Never Smoker    Smokeless tobacco: Never Used    Alcohol use No         Review of Systems   Constitutional: Negative for activity change  +Struggling losing weight  Respiratory: Negative for shortness of breath  Cardiovascular: Negative for chest pain  Gastrointestinal:        Chronic gastritis issues  Genitourinary: Negative for menstrual problem  Neurological:        Hx of migraine headaches  Objective:      /72 (BP Location: Right arm, Patient Position: Sitting, Cuff Size: Large)   Pulse 103   Temp 98 7 °F (37 1 °C) (Tympanic)   Resp 16   Ht 4' 11" (1 499 m)   Wt 82 1 kg (181 lb)   SpO2 98%   BMI 36 56 kg/m²          Physical Exam   Constitutional: She is oriented to person, place, and time  She appears well-developed and well-nourished  No distress  HENT:   Head: Normocephalic and atraumatic  Right Ear: Hearing, tympanic membrane, external ear and ear canal normal    Left Ear: Hearing, tympanic membrane, external ear and ear canal normal    Mouth/Throat: Oropharynx is clear and moist  No oropharyngeal exudate  Eyes: Conjunctivae are normal    Neck: Normal range of motion  Neck supple  No thyromegaly present  Cardiovascular: Normal rate and normal heart sounds  Exam reveals no gallop and no friction rub  No murmur heard    Pulmonary/Chest: Effort normal and breath sounds normal  No respiratory distress  She has no wheezes  She has no rales  Abdominal: Soft  Bowel sounds are normal  She exhibits no distension and no mass  There is no tenderness  There is no rebound and no guarding  Lymphadenopathy:     She has no cervical adenopathy  Neurological: She is alert and oriented to person, place, and time  Skin: She is not diaphoretic  Psychiatric: She has a normal mood and affect  Her behavior is normal  Judgment and thought content normal    Nursing note and vitals reviewed

## 2019-02-11 ENCOUNTER — APPOINTMENT (OUTPATIENT)
Dept: LAB | Facility: CLINIC | Age: 24
End: 2019-02-11
Payer: COMMERCIAL

## 2019-02-11 ENCOUNTER — TELEPHONE (OUTPATIENT)
Dept: OBGYN CLINIC | Facility: CLINIC | Age: 24
End: 2019-02-11

## 2019-02-11 DIAGNOSIS — Z13.1 SCREENING FOR DIABETES MELLITUS: ICD-10-CM

## 2019-02-11 DIAGNOSIS — D68.0 VON WILLEBRAND DISEASE (HCC): Chronic | ICD-10-CM

## 2019-02-11 DIAGNOSIS — R63.5 WEIGHT GAIN: Chronic | ICD-10-CM

## 2019-02-11 LAB
ALBUMIN SERPL BCP-MCNC: 3.9 G/DL (ref 3.5–5)
ALP SERPL-CCNC: 124 U/L (ref 46–116)
ALT SERPL W P-5'-P-CCNC: 38 U/L (ref 12–78)
ANION GAP SERPL CALCULATED.3IONS-SCNC: 7 MMOL/L (ref 4–13)
AST SERPL W P-5'-P-CCNC: 10 U/L (ref 5–45)
BASOPHILS # BLD AUTO: 0.03 THOUSANDS/ΜL (ref 0–0.1)
BASOPHILS NFR BLD AUTO: 0 % (ref 0–1)
BILIRUB SERPL-MCNC: 0.25 MG/DL (ref 0.2–1)
BUN SERPL-MCNC: 13 MG/DL (ref 5–25)
CALCIUM SERPL-MCNC: 8.8 MG/DL (ref 8.3–10.1)
CHLORIDE SERPL-SCNC: 108 MMOL/L (ref 100–108)
CHOLEST SERPL-MCNC: 151 MG/DL (ref 50–200)
CO2 SERPL-SCNC: 25 MMOL/L (ref 21–32)
CREAT SERPL-MCNC: 0.62 MG/DL (ref 0.6–1.3)
EOSINOPHIL # BLD AUTO: 0.12 THOUSAND/ΜL (ref 0–0.61)
EOSINOPHIL NFR BLD AUTO: 2 % (ref 0–6)
ERYTHROCYTE [DISTWIDTH] IN BLOOD BY AUTOMATED COUNT: 13.8 % (ref 11.6–15.1)
EST. AVERAGE GLUCOSE BLD GHB EST-MCNC: 123 MG/DL
GFR SERPL CREATININE-BSD FRML MDRD: 128 ML/MIN/1.73SQ M
GLUCOSE P FAST SERPL-MCNC: 94 MG/DL (ref 65–99)
HBA1C MFR BLD: 5.9 % (ref 4.2–6.3)
HCT VFR BLD AUTO: 41.7 % (ref 34.8–46.1)
HDLC SERPL-MCNC: 38 MG/DL (ref 40–60)
HGB BLD-MCNC: 13.1 G/DL (ref 11.5–15.4)
IMM GRANULOCYTES # BLD AUTO: 0.02 THOUSAND/UL (ref 0–0.2)
IMM GRANULOCYTES NFR BLD AUTO: 0 % (ref 0–2)
LDLC SERPL CALC-MCNC: 101 MG/DL (ref 0–100)
LYMPHOCYTES # BLD AUTO: 2.53 THOUSANDS/ΜL (ref 0.6–4.47)
LYMPHOCYTES NFR BLD AUTO: 35 % (ref 14–44)
MCH RBC QN AUTO: 26.7 PG (ref 26.8–34.3)
MCHC RBC AUTO-ENTMCNC: 31.4 G/DL (ref 31.4–37.4)
MCV RBC AUTO: 85 FL (ref 82–98)
MONOCYTES # BLD AUTO: 0.39 THOUSAND/ΜL (ref 0.17–1.22)
MONOCYTES NFR BLD AUTO: 5 % (ref 4–12)
NEUTROPHILS # BLD AUTO: 4.15 THOUSANDS/ΜL (ref 1.85–7.62)
NEUTS SEG NFR BLD AUTO: 58 % (ref 43–75)
NRBC BLD AUTO-RTO: 0 /100 WBCS
PLATELET # BLD AUTO: 267 THOUSANDS/UL (ref 149–390)
PMV BLD AUTO: 10.6 FL (ref 8.9–12.7)
POTASSIUM SERPL-SCNC: 4 MMOL/L (ref 3.5–5.3)
PROT SERPL-MCNC: 7.9 G/DL (ref 6.4–8.2)
RBC # BLD AUTO: 4.9 MILLION/UL (ref 3.81–5.12)
SODIUM SERPL-SCNC: 140 MMOL/L (ref 136–145)
TRIGL SERPL-MCNC: 60 MG/DL
TSH SERPL DL<=0.05 MIU/L-ACNC: 1.13 UIU/ML (ref 0.36–3.74)
WBC # BLD AUTO: 7.24 THOUSAND/UL (ref 4.31–10.16)

## 2019-02-11 PROCEDURE — 80053 COMPREHEN METABOLIC PANEL: CPT

## 2019-02-11 PROCEDURE — 84443 ASSAY THYROID STIM HORMONE: CPT

## 2019-02-11 PROCEDURE — 80061 LIPID PANEL: CPT

## 2019-02-11 PROCEDURE — 36415 COLL VENOUS BLD VENIPUNCTURE: CPT

## 2019-02-11 PROCEDURE — 83036 HEMOGLOBIN GLYCOSYLATED A1C: CPT

## 2019-02-11 PROCEDURE — 85025 COMPLETE CBC W/AUTO DIFF WBC: CPT

## 2019-02-11 NOTE — TELEPHONE ENCOUNTER
Patient is calling stating that she has changed her mind from the [address] to an IUD and would like a call back from you to [phone]

## 2019-02-12 ENCOUNTER — TELEPHONE (OUTPATIENT)
Dept: FAMILY MEDICINE CLINIC | Facility: CLINIC | Age: 24
End: 2019-02-12

## 2019-02-12 NOTE — TELEPHONE ENCOUNTER
This is in the "Pre-diabetes" range, not diabetes  It is simply a risk factor for future diabetes, and not a guarantee that that will happen - but, focusing on a lower carb diet, and getting exercise are important in combatting this  Thanks    Yudi

## 2019-02-12 NOTE — TELEPHONE ENCOUNTER
Patient called and was given her results and for her A1c, since it was a little high she wanted to know what you would consider pre diabetic and when should she be concerned about it   Please advise

## 2019-02-19 ENCOUNTER — OFFICE VISIT (OUTPATIENT)
Dept: URGENT CARE | Age: 24
End: 2019-02-19
Payer: COMMERCIAL

## 2019-02-19 VITALS
WEIGHT: 181 LBS | DIASTOLIC BLOOD PRESSURE: 76 MMHG | TEMPERATURE: 99.7 F | SYSTOLIC BLOOD PRESSURE: 114 MMHG | BODY MASS INDEX: 36.49 KG/M2 | HEIGHT: 59 IN | HEART RATE: 97 BPM | RESPIRATION RATE: 18 BRPM | OXYGEN SATURATION: 100 %

## 2019-02-19 DIAGNOSIS — L03.317 CELLULITIS OF BUTTOCK: Primary | ICD-10-CM

## 2019-02-19 PROCEDURE — 99213 OFFICE O/P EST LOW 20 MIN: CPT | Performed by: NURSE PRACTITIONER

## 2019-02-19 PROCEDURE — S9088 SERVICES PROVIDED IN URGENT: HCPCS | Performed by: NURSE PRACTITIONER

## 2019-02-19 RX ORDER — CLINDAMYCIN HYDROCHLORIDE 300 MG/1
300 CAPSULE ORAL 2 TIMES DAILY
Qty: 10 CAPSULE | Refills: 0 | Status: SHIPPED | OUTPATIENT
Start: 2019-02-19 | End: 2019-02-24

## 2019-02-20 NOTE — PROGRESS NOTES
[facility]        NAME: Dat Hale is a 21 y o  female  : 1995    MRN: [id number]  DATE: 2019  TIME: 7:30 PM    Assessment and Plan   Cellulitis of buttock [L03 317]  1  Cellulitis of buttock  clindamycin (CLEOCIN) 300 MG capsule         Patient Instructions     Warm moist compresses  Medication  Keep the area clean and dry  Continue to monitor  Follow up with PCP in 3-5 days  Proceed to  ER if symptoms worsen  Chief Complaint     Chief Complaint   Patient presents with    Abscess     Pt c/o red, painful, pus-filled, bloody, hardened cyst on right glute for the past 3 days  History of Present Illness       49-year-old female presenting today with c/o recurrent abscess of right buttock  No f/c  She states she squeezed it in the shower and got pus out of it, no current drainage or fluctuance  No other complaints  Review of Systems   Review of Systems   Constitutional: Negative  Respiratory: Negative  Cardiovascular: Negative  Skin: Positive for color change           Current Medications       Current Outpatient Medications:     clindamycin (CLEOCIN) 300 MG capsule, Take 1 capsule (300 mg total) by mouth 2 (two) times a day for 5 days, Disp: 10 capsule, Rfl: 0    medroxyPROGESTERone (DEPO-PROVERA) 150 mg/mL injection, Inject 1 mL (150 mg total) into a muscle every 3 (three) months, Disp: 1 mL, Rfl: 4    Current Allergies     Allergies as of 2019 - Reviewed 2019   Allergen Reaction Noted    Aspirin  2017            The following portions of the patient's history were reviewed and updated as appropriate: allergies, current medications, past family history, past medical history, past social history, past surgical history and problem list      Past Medical History:   Diagnosis Date    Migraine     Placenta previa antepartum in first trimester     Varicella     As a child    Von Willebrand disease ([facility] [address] )        Past Surgical History: Procedure Laterality Date    CHOLECYSTECTOMY      2017    GALLBLADDER SURGERY      removal- resolved    [place]  DELIVERY ONLY N/A 2018    Procedure:  SECTION (); Surgeon: Princses Dee MD;  Location: [facility] ;  Service: Obstetrics    TONSILLECTOMY      in        Family History   Problem Relation Age of Onset    Diabetes Paternal Grandmother     Hypertension Paternal Grandmother     Diabetes Paternal Grandfather     No Known Problems Mother     No Known Problems Father     No Known Problems Sister     No Known Problems Brother          Medications have been verified  Objective   /76   Pulse 97   Temp 99 7 °F (37 6 °C)   Resp 18   Ht 4' 11" (1 499 m)   Wt 82 1 kg (181 lb)   SpO2 100%   Breastfeeding? Yes   BMI 36 56 kg/m²        Physical Exam     Physical Exam   Constitutional: She is oriented to person, place, and time  She appears well-developed and well-nourished  Cardiovascular: Normal rate, regular rhythm and normal heart sounds  Pulmonary/Chest: Effort normal and breath sounds normal    Neurological: She is alert and oriented to person, place, and time  Skin: Skin is warm and dry  There is erythema (right buttock)  No open wound or fluctuant area  No drainage  Nursing note and vitals reviewed

## 2019-02-20 NOTE — PATIENT INSTRUCTIONS
Warm moist compresses  Medication  Keep the area clean and dry  Continue to monitor  Cellulitis   AMBULATORY CARE:   Cellulitis  is a skin infection caused by bacteria  Cellulitis usually appears on the legs and feet, arms and hands, or face  Common signs and symptoms include the following:   · A red, warm, swollen area on your skin    · Pain when the area is touched    · Bumps or blisters (abscess) that may drain pus    · Bumpy, raised skin that feels like an orange peel  Call 911 if:   · You have sudden trouble breathing or chest pain  Seek care immediately if:   · Your wound gets larger and more painful  · You feel a crackling under your skin when you touch it  · You have purple dots or bumps on your skin, or you see bleeding under your skin  · You have new swelling and pain in your legs  · The red, warm, swollen area gets larger  · You see red streaks coming from the infected area  Contact your healthcare provider if:   · You have a fever  · Your fever or pain does not go away or gets worse  · The area does not get smaller after 2 days of antibiotics  · Your skin is flaking or peeling off  · You have questions or concerns about your condition or care  Treatment for cellulitis  may decrease symptoms, stop the infection from spreading, and cure the infection  Treatment depends on how severe your cellulitis is  Cellulitis may go away on its own  You may  instead need antibiotics to help treat the bacterial infection and medicines for pain  Your healthcare provider may draw a [place] around the edges of your cellulitis  If your cellulitis spreads, your healthcare provider will see it outside of the [place]  Manage your symptoms:   · Elevate the area above the level of your heart  as often as you can  This will help decrease swelling and pain  Prop the area on pillows or blankets to keep it elevated comfortably  · Clean the area daily until the wound scabs over    Gently wash the area with soap and water  Pat dry  Use dressings as directed  · Place cool or warm, wet cloths on the area as directed  Use clean cloths and clean water  Leave it on the area until the cloth is room temperature  Pat the area dry with a clean, dry cloth  The cloths may help decrease pain  Prevent cellulitis:   · Do not scratch bug bites or areas of injury  You increase your risk for cellulitis by scratching these areas  · Do not share personal items, such as towels, clothing, and razors  · Clean exercise equipment  with germ-killing  before and after you use it  · Wash your hands often  Use soap and water  Wash your hands after you use the bathroom, change a child's diapers, or sneeze  Wash your hands before you prepare or eat food  Use lotion to prevent dry, cracked skin  · Wear pressure stockings as directed  You may be told to wear the stockings if you have peripheral edema  The stockings improve blood flow and decrease swelling  · Treat athlete's foot  This can help prevent the spread of a bacterial skin infection  Follow up with your healthcare provider within 3 days, or as directed: Your healthcare provider will check if your cellulitis is getting better  You may need different medicine  Write down your questions so you remember to ask them during your visits  © 2017 [address] [employer] Information is for End User's use only and may not be sold, redistributed or otherwise used for commercial purposes  All illustrations and images included in CareNotes® are the copyrighted property of A [employer] M [employer]  or [employer]  The above information is an  only  It is not intended as medical advice for individual conditions or treatments  Talk to your doctor, nurse or pharmacist before following any medical regimen to see if it is safe and effective for you

## 2019-02-21 ENCOUNTER — TELEPHONE (OUTPATIENT)
Dept: FAMILY MEDICINE CLINIC | Facility: CLINIC | Age: 24
End: 2019-02-21

## 2019-02-21 DIAGNOSIS — L73.9 FOLLICULITIS: Primary | ICD-10-CM

## 2019-02-21 NOTE — TELEPHONE ENCOUNTER
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

## 2019-02-23 ENCOUNTER — HOSPITAL ENCOUNTER (EMERGENCY)
Facility: HOSPITAL | Age: 24
Discharge: HOME | End: 2019-02-23
Attending: EMERGENCY MEDICINE
Payer: COMMERCIAL

## 2019-02-23 VITALS
DIASTOLIC BLOOD PRESSURE: 72 MMHG | RESPIRATION RATE: 16 BRPM | SYSTOLIC BLOOD PRESSURE: 133 MMHG | BODY MASS INDEX: 36.36 KG/M2 | HEART RATE: 100 BPM | WEIGHT: 180 LBS | TEMPERATURE: 99.5 F | OXYGEN SATURATION: 100 %

## 2019-02-23 DIAGNOSIS — L03.317 CELLULITIS AND ABSCESS OF BUTTOCK: Primary | ICD-10-CM

## 2019-02-23 DIAGNOSIS — L02.31 CELLULITIS AND ABSCESS OF BUTTOCK: Primary | ICD-10-CM

## 2019-02-23 PROCEDURE — 99282 EMERGENCY DEPT VISIT SF MDM: CPT

## 2019-02-23 RX ORDER — CLINDAMYCIN HYDROCHLORIDE 300 MG/1
300 CAPSULE ORAL 4 TIMES DAILY
Qty: 28 CAPSULE | Refills: 0 | Status: SHIPPED | OUTPATIENT
Start: 2019-02-23 | End: 2019-03-02

## 2019-02-23 RX ORDER — LIDOCAINE HYDROCHLORIDE AND EPINEPHRINE 10; 10 MG/ML; UG/ML
1 INJECTION, SOLUTION INFILTRATION; PERINEURAL ONCE
Status: COMPLETED | OUTPATIENT
Start: 2019-02-23 | End: 2019-02-23

## 2019-02-23 RX ADMIN — LIDOCAINE HYDROCHLORIDE,EPINEPHRINE BITARTRATE 1 ML: 10; .01 INJECTION, SOLUTION INFILTRATION; PERINEURAL at 19:54

## 2019-02-23 RX ADMIN — Medication 1 APPLICATION: at 19:54

## 2019-02-24 NOTE — ED PROVIDER NOTES
History  Chief Complaint   Patient presents with    Abscess     reports abscess right glute, states seen at urgent care given abx has not taken due to breastfeeding, states has decreased in size since tues     22 yo female history of von Willebrand's disease, presents for evaluation of an abscess over the right buttock for the past 6 days  Patient reports that she has been getting pimple like lesions that are surrounded with a hive-like appearance which she describes as red and raised  Patient reports that she currently has it on the right buttock  States that she was seen at urgent care 4 days ago and prescribed clindamycin and advised warm compresses  Patient reports that she has not taken abx because she is breast feeding  Patient states that she has been applying warm compresses and has been noting clear fluid from the pimple  Reports that it has decreased in size however it is still painful  States that today she noted a purplish color over the area and was concerned for worsening symptoms  She denies having a fever, paresthesias, red streaking  She states that she has had these and other parts her body and after applying warm compresses a normal resolved  She denies having history of MRSA  Prior to Admission Medications   Prescriptions Last Dose Informant Patient Reported? Taking?    clindamycin (CLEOCIN) 300 MG capsule Not Taking at Unknown time  No No   Sig: Take 1 capsule (300 mg total) by mouth 2 (two) times a day for 5 days   Patient not taking: Reported on 2/23/2019   medroxyPROGESTERone (DEPO-PROVERA) 150 mg/mL injection   No No   Sig: Inject 1 mL (150 mg total) into a muscle every 3 (three) months      Facility-Administered Medications: None       Past Medical History:   Diagnosis Date    Migraine     Placenta previa antepartum in first trimester     Varicella     As a child    Von Willebrand disease ([facility] [address] )        Past Surgical History:   Procedure Laterality Date    CHOLECYSTECTOMY      2017    GALLBLADDER SURGERY      removal- resolved    [place]  DELIVERY ONLY N/A 2018    Procedure:  SECTION (); Surgeon: Mariana Conner MD;  Location: [facility] ;  Service: Obstetrics    TONSILLECTOMY      in        Family History   Problem Relation Age of Onset    Diabetes Paternal Grandmother     Hypertension Paternal Grandmother     Diabetes Paternal Grandfather     No Known Problems Mother     No Known Problems Father     No Known Problems Sister     No Known Problems Brother      I have reviewed and agree with the history as documented  Social History     Tobacco Use    Smoking status: Never Smoker    Smokeless tobacco: Never Used   Substance Use Topics    Alcohol use: No    Drug use: No        Review of Systems   Constitutional: Negative for chills and fever  Gastrointestinal: Negative for constipation, diarrhea, nausea and vomiting  Skin: Positive for color change and wound  Neurological: Negative for weakness and numbness  Physical Exam  Physical Exam   Constitutional: She appears well-developed and well-nourished  No distress  Cardiovascular: Normal rate and normal heart sounds  Pulmonary/Chest: Effort normal and breath sounds normal    Musculoskeletal: Normal range of motion  Neurological: She is alert  Skin: Skin is warm  She is not diaphoretic  There is erythema  Approximately 4 cm area of erythema over right buttocks  Does not extend into rectum  No drainage noted  Fluctuance noted over 2 cm  Hematoma noted          Vital Signs  ED Triage Vitals [19]   Temperature Pulse Respirations Blood Pressure SpO2   99 5 °F (37 5 °C) 100 16 133/72 100 %      Temp Source Heart Rate Source Patient Position - Orthostatic VS BP Location FiO2 (%)   Temporal -- Sitting Right arm --      Pain Score       8           Vitals:    19 1900   BP: 133/72   Pulse: 100   Patient Position - Orthostatic VS: Sitting       Visual Acuity      ED Medications  Medications   LET gel 1 application (1 application Topical Given 2/23/19 1954)   lidocaine-epinephrine (XYLOCAINE/EPINEPHRINE) 1 %-1:100,000 injection 1 mL (1 mL Infiltration Given 2/23/19 1954)       Diagnostic Studies  Results Reviewed     None                 No orders to display              Procedures  Incision/Drainage  Date/Time: 2/26/2019 8:53 AM  Performed by: Nhung Bal PA-C  Authorized by: Nhung Bal PA-C     Patient location:  ED  Other Assisting Provider: No    Consent:     Consent obtained:  Verbal    Consent given by:  Patient    Risks discussed:  Bleeding, incomplete drainage, pain and infection  Universal protocol:     Patient identity confirmed:  Verbally with patient  Location:     Type:  Abscess    Location:  Anogenital    Anogenital location: right buttock  Pre-procedure details:     Skin preparation:  Betadine  Anesthesia (see MAR for exact dosages): Anesthesia method:  Local infiltration    Local anesthetic:  Lidocaine 1% w/o epi  Procedure details:     Complexity:  Simple    Incision types:  Stab incision    Scalpel blade:  11    Approach:  Open    Incision depth:  Subcutaneous    Drainage:  Bloody    Drainage amount:  Scant    Wound treatment:  Wound left open    Packing materials:  None  Post-procedure details:     Patient tolerance of procedure: Tolerated well, no immediate complications           Phone Contacts  ED Phone Contact    ED Course                               MDM  Number of Diagnoses or Management Options  Cellulitis and abscess of buttock:   Diagnosis management comments: 80-year-old female presents for evaluation of right-sided buttock abscess surrounded by cellulitis  Attempt to drain the abscess was made however scant bloody and purulent discharge was noted  Discussed with the pharmacist and advised that patient should start taking the clindamycin even while breastfeeding as it is permissible    Advised to pump and dump after 60 minutes as that is the peak of when the medication is absorbed  Return precautions given if symptoms worsen  Disposition  Final diagnoses:   Cellulitis and abscess of buttock     Time reflects when diagnosis was documented in both MDM as applicable and the Disposition within this note     Time User Action Codes Description Comment    2/23/2019  9:12 PM Jamin Torres [L02 31,  L03 317] Cellulitis and abscess of buttock       ED Disposition     ED Disposition Condition Date/Time Comment    Discharge Stable Sat Feb 23, 2019  9:12 PM [facility] discharge to home/self care  Follow-up Information     Follow up With Specialties Details Why Contact Info    Abiel Ambrosio DO Family Medicine Schedule an appointment as soon as possible for a visit in 2 days Follow up for re-check of symptoms [address] [facility]   [phone]            Discharge Medication List as of 2/23/2019  9:13 PM      START taking these medications    Details   !! clindamycin (CLEOCIN) 300 MG capsule Take 1 capsule (300 mg total) by mouth 4 (four) times a day for 7 days, Starting Sat 2/23/2019, Until Sat 3/2/2019, Print       !! - Potential duplicate medications found  Please discuss with provider  CONTINUE these medications which have NOT CHANGED    Details   !! clindamycin (CLEOCIN) 300 MG capsule Take 1 capsule (300 mg total) by mouth 2 (two) times a day for 5 days, Starting Tue 2/19/2019, Until Sun 2/24/2019, Normal      medroxyPROGESTERone (DEPO-PROVERA) 150 mg/mL injection Inject 1 mL (150 mg total) into a muscle every 3 (three) months, Starting Mon 8/6/2018, Normal       !! - Potential duplicate medications found  Please discuss with provider  No discharge procedures on file      ED Provider  Electronically Signed by           Jennie Vee PA-C  02/26/19 [id number]

## 2019-03-01 ENCOUNTER — PROCEDURE VISIT (OUTPATIENT)
Dept: OBGYN CLINIC | Facility: CLINIC | Age: 24
End: 2019-03-01
Payer: COMMERCIAL

## 2019-03-01 VITALS
DIASTOLIC BLOOD PRESSURE: 80 MMHG | HEIGHT: 59 IN | WEIGHT: 180 LBS | SYSTOLIC BLOOD PRESSURE: 128 MMHG | BODY MASS INDEX: 36.29 KG/M2

## 2019-03-01 DIAGNOSIS — Z30.430 ENCOUNTER FOR IUD INSERTION: Primary | ICD-10-CM

## 2019-03-01 PROCEDURE — 58300 INSERT INTRAUTERINE DEVICE: CPT | Performed by: NURSE PRACTITIONER

## 2019-03-01 NOTE — PROGRESS NOTES
Abran Mehta 77-year-old female  here today for IUD placement  Risk and Benefits discussed  Dr Rina Delgado at bedside to review procedure and answer any additional questions  Iud insertions  Date/Time: 3/1/2019 10:47 AM  Performed by: HARLEEN Mcdermott  Authorized by: HARLEEN Mcdermott     Consent:     Consent obtained:  Verbal and written    Consent given by:  Patient    Procedure risks and benefits discussed: yes      Patient questions answered: yes      Patient agrees, verbalizes understanding, and wants to proceed: yes      Educational handouts given: yes      Instructions and paperwork completed: yes    Procedure:     Pelvic exam performed: yes      Negative GC/chlamydia test: low STD risk  Negative urine pregnancy test: yes      Cervix cleaned and prepped: yes      Speculum placed in vagina: yes      Tenaculum applied to cervix: yes      Uterus sounded: yes      Uterus sound depth (cm):  7    IUD inserted with no complications: yes      IUD type: Neldon Chloe  Strings trimmed: yes    Post-procedure:     Patient tolerated procedure well: yes      Patient will follow up after next period: yes    Comments:      IUD inserted without difficulty  Transabdominal US shows proper placement and no evidence of perforation  Patient given instruction booklet  She is to return in 5 weeks for follow up  An Campa was seen today for contraception      Diagnoses and all orders for this visit:    Encounter for IUD insertion  -     Iud insertions  -     levonorgestrel [facility]) intrauterine device IUD 1 Intra Uterine Device

## 2019-04-09 ENCOUNTER — OFFICE VISIT (OUTPATIENT)
Dept: OBGYN CLINIC | Facility: CLINIC | Age: 24
End: 2019-04-09
Payer: COMMERCIAL

## 2019-04-09 VITALS
DIASTOLIC BLOOD PRESSURE: 84 MMHG | WEIGHT: 182 LBS | BODY MASS INDEX: 36.69 KG/M2 | SYSTOLIC BLOOD PRESSURE: 124 MMHG | HEIGHT: 59 IN

## 2019-04-09 DIAGNOSIS — Z30.431 IUD CHECK UP: Primary | ICD-10-CM

## 2019-04-09 PROCEDURE — 99213 OFFICE O/P EST LOW 20 MIN: CPT | Performed by: NURSE PRACTITIONER

## 2019-04-19 ENCOUNTER — CONSULT (OUTPATIENT)
Dept: NEUROLOGY | Facility: CLINIC | Age: 24
End: 2019-04-19
Payer: COMMERCIAL

## 2019-04-19 VITALS
BODY MASS INDEX: 36.15 KG/M2 | SYSTOLIC BLOOD PRESSURE: 107 MMHG | HEART RATE: 68 BPM | DIASTOLIC BLOOD PRESSURE: 68 MMHG | WEIGHT: 179 LBS

## 2019-04-19 DIAGNOSIS — G43.709 CHRONIC MIGRAINE WITHOUT AURA WITHOUT STATUS MIGRAINOSUS, NOT INTRACTABLE: Chronic | ICD-10-CM

## 2019-04-19 DIAGNOSIS — D68.0 VON WILLEBRAND DISEASE (HCC): ICD-10-CM

## 2019-04-19 DIAGNOSIS — G43.719 INTRACTABLE CHRONIC MIGRAINE WITHOUT AURA AND WITHOUT STATUS MIGRAINOSUS: Primary | ICD-10-CM

## 2019-04-19 DIAGNOSIS — H53.9 VISION CHANGES: ICD-10-CM

## 2019-04-19 PROCEDURE — 99244 OFF/OP CNSLTJ NEW/EST MOD 40: CPT | Performed by: PSYCHIATRY & NEUROLOGY

## 2019-04-19 PROCEDURE — 96372 THER/PROPH/DIAG INJ SC/IM: CPT | Performed by: PSYCHIATRY & NEUROLOGY

## 2019-04-19 RX ORDER — ACETAMINOPHEN, ASPIRIN AND CAFFEINE 250; 250; 65 MG/1; MG/1; MG/1
1 TABLET, FILM COATED ORAL EVERY 6 HOURS PRN
COMMUNITY
End: 2019-11-05

## 2019-04-19 RX ORDER — TOPIRAMATE 25 MG/1
TABLET ORAL
Qty: 120 TABLET | Refills: 2 | Status: SHIPPED | OUTPATIENT
Start: 2019-04-19 | End: 2019-11-05

## 2019-04-19 RX ORDER — KETOROLAC TROMETHAMINE 30 MG/ML
30 INJECTION, SOLUTION INTRAMUSCULAR; INTRAVENOUS ONCE
Status: COMPLETED | OUTPATIENT
Start: 2019-04-19 | End: 2019-04-19

## 2019-04-19 RX ORDER — RIZATRIPTAN BENZOATE 10 MG/1
TABLET, ORALLY DISINTEGRATING ORAL
Qty: 12 TABLET | Refills: 1 | Status: SHIPPED | OUTPATIENT
Start: 2019-04-19 | End: 2019-08-23 | Stop reason: SDUPTHER

## 2019-04-19 RX ADMIN — KETOROLAC TROMETHAMINE 30 MG: 30 INJECTION, SOLUTION INTRAMUSCULAR; INTRAVENOUS at 09:16

## 2019-05-13 ENCOUNTER — TELEPHONE (OUTPATIENT)
Dept: NEUROLOGY | Facility: CLINIC | Age: 24
End: 2019-05-13

## 2019-07-08 ENCOUNTER — OFFICE VISIT (OUTPATIENT)
Dept: URGENT CARE | Age: 24
End: 2019-07-08
Payer: COMMERCIAL

## 2019-07-08 VITALS
WEIGHT: 182 LBS | HEART RATE: 85 BPM | TEMPERATURE: 99 F | HEIGHT: 59 IN | DIASTOLIC BLOOD PRESSURE: 72 MMHG | SYSTOLIC BLOOD PRESSURE: 129 MMHG | BODY MASS INDEX: 36.69 KG/M2

## 2019-07-08 DIAGNOSIS — B34.9 VIRAL SYNDROME: ICD-10-CM

## 2019-07-08 DIAGNOSIS — L02.413 CUTANEOUS ABSCESS OF RIGHT UPPER EXTREMITY: Primary | ICD-10-CM

## 2019-07-08 PROCEDURE — 99213 OFFICE O/P EST LOW 20 MIN: CPT | Performed by: FAMILY MEDICINE

## 2019-07-08 PROCEDURE — S9088 SERVICES PROVIDED IN URGENT: HCPCS | Performed by: FAMILY MEDICINE

## 2019-07-08 RX ORDER — CLINDAMYCIN HYDROCHLORIDE 300 MG/1
300 CAPSULE ORAL 4 TIMES DAILY
Qty: 40 CAPSULE | Refills: 0 | Status: SHIPPED | OUTPATIENT
Start: 2019-07-08 | End: 2019-07-18

## 2019-07-08 NOTE — PATIENT INSTRUCTIONS
Rest, limit activity  Clindamycin 4 times a day until finished (please take probiotics)  Warm soaks to area 2 to 3 times a day for 15-20 minutes  Prescription antibiotic ointment and clean dressing to area 2 to 3 times a day  Recheck/follow-up with treating physicians as discussed    Please go to the hospital emergency department if needed

## 2019-07-08 NOTE — PROGRESS NOTES
[facility]        NAME: Royal Cavazos is a 25 y o  female  : 1995    MRN: [id number]  DATE: 2019  TIME: 3:54 PM    Assessment and Plan   Cutaneous abscess of right upper extremity [L02 413]  1  Cutaneous abscess of right upper extremity  clindamycin (CLEOCIN) 300 MG capsule    mupirocin (BACTROBAN) 2 % ointment   2  Viral syndrome           Patient Instructions     Patient Instructions   Rest, limit activity  Clindamycin 4 times a day until finished (please take probiotics)  Warm soaks to area 2 to 3 times a day for 15-20 minutes  Prescription antibiotic ointment and clean dressing to area 2 to 3 times a day  Recheck/follow-up with treating physicians as discussed  Please go to the hospital emergency department if needed      Follow up with PCP in 3-5 days  Proceed to  ER if symptoms worsen  Chief Complaint     Chief Complaint   Patient presents with    Generalized Body Aches     since Friday night, felt dehydrated, drank water, electrolytes, OTC meds    Fever     101-103F    Migraine     w/ dizziness    Cyst     has reoccuring cysts, now has one on her R Upper thigh         History of Present Illness       Fever, body aches, migraine, abscess of the right upper leg area; patient states history of previous abscesses at other body locations - patient states she is seeing a specialist at the end of the month      Review of Systems   Review of Systems   Constitutional: Positive for fever  Musculoskeletal: Positive for myalgias  Skin:        Draining abscess proximal aspect of the anterior right upper leg area   Neurological: Positive for headaches           Current Medications       Current Outpatient Medications:     aspirin-acetaminophen-caffeine (EXCEDRIN MIGRAINE) 250-250-65 MG per tablet, Take 1 tablet by mouth every 6 (six) hours as needed for headaches, Disp: , Rfl:     clindamycin (CLEOCIN) 300 MG capsule, Take 1 capsule (300 mg total) by mouth 4 (four) times a day for 40 doses, Disp: 40 capsule, Rfl: 0    mupirocin (BACTROBAN) 2 % ointment, Apply topically 3 (three) times a day, Disp: 22 g, Rfl: 2    rizatriptan (MAXALT-MLT) 10 MG disintegrating tablet, Take 1 tab at migraine onset can repeat once in 2 hours  Max 2 tabs in 24 hours  Max 2-3 days/week (Patient not taking: Reported on 2019), Disp: 12 tablet, Rfl: 1    topiramate (TOPAMAX) 25 mg tablet, 1 tab HS X 1 week, then increase by 1 tab every 1-2 weeks to an initial goal of 4 tabs HS  (Patient not taking: Reported on 2019), Disp: 120 tablet, Rfl: 2    Current Allergies     Allergies as of 2019 - Reviewed 2019   Allergen Reaction Noted    Aspirin  2017            The following portions of the patient's history were reviewed and updated as appropriate: allergies, current medications, past family history, past medical history, past social history, past surgical history and problem list      Past Medical History:   Diagnosis Date    Migraine     Placenta previa antepartum in first trimester     Varicella     As a child    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[address] )        Past Surgical History:   Procedure Laterality Date    CHOLECYSTECTOMY      2017    GALLBLADDER SURGERY      removal- resolved    [place]  DELIVERY ONLY N/A 2018    Procedure:  SECTION (); Surgeon: Thelma Mercer MD;  [facility];  Service: Obstetrics    TONSILLECTOMY      in        Family History   Problem Relation Age of Onset    Diabetes Paternal Grandmother     Hypertension Paternal Grandmother     Diabetes Paternal Grandfather     No Known Problems Mother     No Known Problems Father     No Known Problems Sister     No Known Problems Brother          Medications have been verified          Objective   /72 (BP Location: Right arm, Patient Position: Sitting)   Pulse 85   Temp 99 °F (37 2 °C) (Temporal)   Ht 4' 11" (1 499 m)   Wt 82 6 kg (182 lb)   BMI 36 76 kg/m²        Physical Exam     Physical Exam   Constitutional: She is oriented to person, place, and time  She appears well-developed and well-nourished  HENT:   Mouth/Throat: Oropharynx is clear and moist    Eyes: Pupils are equal, round, and reactive to light  Conjunctivae and EOM are normal    Neck: Normal range of motion  Neck supple  Cardiovascular: Normal rate, regular rhythm and normal heart sounds  Pulmonary/Chest: Effort normal and breath sounds normal    Neurological: She is alert and oriented to person, place, and time  No nuchal rigidity   Skin:   Patient removed may indeed from anterior aspect of the proximal right upper leg area; draining abscess anterior aspect of the proximal right upper leg area with surrounding erythema; patient given a packet of antibiotic ointment and 2 Band-Aids to apply to the area area   Psychiatric: She has a normal mood and affect  Her behavior is normal    Nursing note and vitals reviewed

## 2019-07-08 NOTE — LETTER
July 8, 2019     Patient: Ayo Brito   YOB: 1995   Date of Visit: 7/8/2019       To Whom It May Concern: It is my medical opinion that Ayo Brito may return to work on 07/10/2019  If you have any questions or concerns, please don't hesitate to call           Sincerely,        Erik Navas DO    CC: No Recipients

## 2019-07-09 ENCOUNTER — OFFICE VISIT (OUTPATIENT)
Dept: URGENT CARE | Facility: MEDICAL CENTER | Age: 24
End: 2019-07-09
Payer: COMMERCIAL

## 2019-07-09 VITALS
TEMPERATURE: 98.7 F | RESPIRATION RATE: 16 BRPM | SYSTOLIC BLOOD PRESSURE: 131 MMHG | HEIGHT: 59 IN | BODY MASS INDEX: 36.49 KG/M2 | HEART RATE: 96 BPM | OXYGEN SATURATION: 99 % | DIASTOLIC BLOOD PRESSURE: 60 MMHG | WEIGHT: 181 LBS

## 2019-07-09 DIAGNOSIS — B34.9 VIRAL SYNDROME: Primary | ICD-10-CM

## 2019-07-09 PROCEDURE — 99213 OFFICE O/P EST LOW 20 MIN: CPT | Performed by: PHYSICIAN ASSISTANT

## 2019-07-09 PROCEDURE — S9088 SERVICES PROVIDED IN URGENT: HCPCS | Performed by: PHYSICIAN ASSISTANT

## 2019-07-09 RX ORDER — ALBUTEROL SULFATE 90 UG/1
2 AEROSOL, METERED RESPIRATORY (INHALATION) EVERY 6 HOURS PRN
Qty: 1 INHALER | Refills: 0 | Status: SHIPPED | OUTPATIENT
Start: 2019-07-09 | End: 2019-11-05

## 2019-07-09 RX ORDER — ONDANSETRON 4 MG/1
4 TABLET, ORALLY DISINTEGRATING ORAL EVERY 6 HOURS PRN
Qty: 20 TABLET | Refills: 0 | Status: SHIPPED | OUTPATIENT
Start: 2019-07-09 | End: 2019-11-05

## 2019-07-09 NOTE — LETTER
July 9, 2019     Patient: Ev Maria   YOB: 1995   Date of Visit: 7/9/2019       To Whom it May Concern:    Ev Maria was seen in my clinic on 7/9/2019  She may return to work on 07/12/2019  Patient may return sooner if symptoms have improved  If you have any questions or concerns, please don't hesitate to call  Sincerely,          Becki Johnson PA-C        CC: No Recipients

## 2019-07-10 NOTE — PATIENT INSTRUCTIONS
Continue take Motrin and/or Tylenol as needed for fevers aches and pains  Use medications as directed for symptomatic relief as needed  Drink plenty of fluids and stay well hydrated  Continue taking antibiotic  Follow up with PCP in 3-5 days  Proceed to  ER if symptoms worsen  Viral Syndrome   AMBULATORY CARE:   Viral syndrome  is a term used for general symptoms of a viral infection that has no clear cause  Viruses are spread easily from person to person through the air and on shared items  Common symptoms include the following:   · Fever and chills    · A runny or stuffy nose     · Cough, sore throat, or hoarseness     · Headache, or pain and pressure around your eyes     · Muscle aches and joint pain     · Shortness of breath or wheezing     · Abdominal pain, cramps, and diarrhea     · Nausea, vomiting, or loss of appetite  Call 911 for the following:   · You have a seizure  · You cannot be woken  · You have chest pain or trouble breathing  Seek care immediately if:   · You have a stiff neck, a bad headache, and sensitivity to light  · You feel weak, dizzy, or confused  · You stop urinating or urinate a lot less than normal      · You cough up blood or thick, yellow or green, mucus  · You have severe abdominal pain or your abdomen is larger than usual   Contact your healthcare provider if:   · Your symptoms do not get better with treatment, or get worse, after 3 days  · You have a rash or ear pain  · You have burning when you urinate  · You have questions or concerns about your condition or care  Treatment for viral syndrome  may include medicines to manage your symptoms  You may  need any of the following:  · Acetaminophen  decreases pain and fever  It is available without a doctor's order  Ask how much medicine to take and how often to take it  Follow directions  Acetaminophen can cause liver damage if not taken correctly       · NSAIDs , such as ibuprofen, help decrease swelling, pain, and fever  NSAIDs can cause stomach bleeding or kidney problems in certain people  If you take blood thinner medicine, always ask your healthcare provider if NSAIDs are safe for you  Always read the medicine label and follow directions  · Cold medicine  helps decrease swelling, control a cough, and relieve chest or nasal congestion  · Saline nasal spray  helps decrease nasal congestion  · Take your medicine as directed  Contact your healthcare provider if you think your medicine is not helping or if you have side effects  Tell him of her if you are allergic to any medicine  Keep a list of the medicines, vitamins, and herbs you take  Include the amounts, and when and why you take them  Bring the list or the pill bottles to follow-up visits  Carry your medicine list with you in case of an emergency  Manage your symptoms:   · Drink liquids as directed  to prevent dehydration  Ask how much liquid to drink each day and which liquids are best for you  Ask if you should drink an oral rehydration solution (ORS)  An ORS has the right amounts of water, salts, and sugar you need to replace body fluids  This may help prevent dehydration caused by vomiting or diarrhea  Do not drink liquids with caffeine  Drinks with caffeine can make dehydration worse  · Get plenty of rest  to help your body heal  Take naps throughout the day  Ask your healthcare provider when you can return to work and your normal activities  · Use a cool mist humidifier  to help you breathe easier if you have nasal or chest congestion  Ask your healthcare provider how to use a cool mist humidifier  · Eat honey or use cough drops  to help decrease throat discomfort  Ask your healthcare provider how much honey you should eat each day  Cough drops are available without a doctor's order  Follow directions for taking cough drops  · Do not smoke and stay away from others who smoke    Nicotine and other chemicals in cigarettes and cigars can cause lung damage  Smoking can also delay healing  Ask your healthcare provider for information if you currently smoke and need help to quit  E-cigarettes or smokeless tobacco still contain nicotine  Talk to your healthcare provider before you use these products  · Wash your hands frequently  to prevent the spread of germs to others  Use soap and water  Use gel hand  when soap and water are not available  Wash your hands after you use the bathroom, cough, or sneeze  Wash your hands before you prepare or eat food  Follow up with your healthcare provider as directed:  Write down your questions so you remember to ask them during your visits  © 2017 [facility] Information is for End User's use only and may not be sold, redistributed or otherwise used for commercial purposes  All illustrations and images included in CareNotes® are the copyrighted property of [employer]  or [employer]  The above information is an  only  It is not intended as medical advice for individual conditions or treatments  Talk to your doctor, nurse or pharmacist before following any medical regimen to see if it is safe and effective for you

## 2019-07-10 NOTE — PROGRESS NOTES
[facility]        NAME: Norberto Rodriguez is a 25 y o  female  : 1995    MRN: [id number]  DATE: 2019  TIME: 9:33 PM    Assessment and Plan   Viral syndrome [B34 9]  1  Viral syndrome  ondansetron (ZOFRAN-ODT) 4 mg disintegrating tablet    albuterol (PROVENTIL HFA,VENTOLIN HFA) 90 mcg/act inhaler         Patient Instructions     Continue take Motrin and/or Tylenol as needed for fevers aches and pains  Use medications as directed for symptomatic relief as needed  Drink plenty of fluids and stay well hydrated  Continue taking antibiotic  Follow up with PCP in 3-5 days  Proceed to  ER if symptoms worsen  Chief Complaint     Chief Complaint   Patient presents with    Fever     Intermittent fevers x 5 days  Fever, nausea, fatigue, dizziness, pain in neck and shoulders  Fever staying around 103  Using tylenol, last dose 1400  History of Present Illness       27-year-old female presents with five-day history of body aches pains fevers chills and just started today with some upset stomach nausea vomiting  Denies any diarrhea  Denies any chest pain  Was recently seen at urgent care for a cyst that was drained  She reports that the area with a cyst was is gotten better since she was seen  Patient when she was seen at the urgent care yesterday was concerned about her body aches pains and fevers at that point in time however she felt that she was not addressed  Denies any dysuria  No coughing sore throat or ear pain    Fever   This is a new problem  The current episode started in the past 7 days  The problem has been waxing and waning  Associated symptoms include abdominal pain, fatigue, a fever, myalgias, nausea and vomiting  Pertinent negatives include no change in bowel habit, chills, congestion, coughing, rash or sore throat  Nothing aggravates the symptoms  She has tried NSAIDs for the symptoms  The treatment provided mild relief         Review of Systems   Review of Systems Constitutional: Positive for fatigue and fever  Negative for chills  HENT: Negative  Negative for congestion and sore throat  Eyes: Negative  Respiratory: Negative  Negative for cough  Cardiovascular: Negative  Gastrointestinal: Positive for abdominal pain, nausea and vomiting  Negative for change in bowel habit  Musculoskeletal: Positive for myalgias  Skin: Negative  Negative for rash  Neurological: Negative  Current Medications       Current Outpatient Medications:     albuterol (PROVENTIL HFA,VENTOLIN HFA) 90 mcg/act inhaler, Inhale 2 puffs every 6 (six) hours as needed for wheezing, Disp: 1 Inhaler, Rfl: 0    aspirin-acetaminophen-caffeine (EXCEDRIN MIGRAINE) 250-250-65 MG per tablet, Take 1 tablet by mouth every 6 (six) hours as needed for headaches, Disp: , Rfl:     clindamycin (CLEOCIN) 300 MG capsule, Take 1 capsule (300 mg total) by mouth 4 (four) times a day for 40 doses, Disp: 40 capsule, Rfl: 0    mupirocin (BACTROBAN) 2 % ointment, Apply topically 3 (three) times a day, Disp: 22 g, Rfl: 2    ondansetron (ZOFRAN-ODT) 4 mg disintegrating tablet, Take 1 tablet (4 mg total) by mouth every 6 (six) hours as needed for nausea or vomiting, Disp: 20 tablet, Rfl: 0    rizatriptan (MAXALT-MLT) 10 MG disintegrating tablet, Take 1 tab at migraine onset can repeat once in 2 hours  Max 2 tabs in 24 hours  Max 2-3 days/week (Patient not taking: Reported on 7/8/2019), Disp: 12 tablet, Rfl: 1    topiramate (TOPAMAX) 25 mg tablet, 1 tab HS X 1 week, then increase by 1 tab every 1-2 weeks to an initial goal of 4 tabs HS   (Patient not taking: Reported on 7/8/2019), Disp: 120 tablet, Rfl: 2    Current Allergies     Allergies as of 07/09/2019 - Reviewed 07/09/2019   Allergen Reaction Noted    Aspirin  06/24/2017            The following portions of the patient's history were reviewed and updated as appropriate: allergies, current medications, past family history, past medical history, past social history, past surgical history and problem list      Past Medical History:   Diagnosis Date    Migraine     Placenta previa antepartum in first trimester     Varicella     As a child    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[address] )        Past Surgical History:   Procedure Laterality Date    CHOLECYSTECTOMY      2017    GALLBLADDER SURGERY      removal- resolved    [place]  DELIVERY ONLY N/A 2018    Procedure:  SECTION (); Surgeon: Román Morel MD;  Location: [facility];  Service: Obstetrics    TONSILLECTOMY      in        Family History   Problem Relation Age of Onset    Diabetes Paternal Grandmother     Hypertension Paternal Grandmother     Diabetes Paternal Grandfather     No Known Problems Mother     No Known Problems Father     No Known Problems Sister     No Known Problems Brother          Medications have been verified  Objective   /60   Pulse 96   Temp 98 7 °F (37 1 °C)   Resp 16   Ht 4' 11" (1 499 m)   Wt 82 1 kg (181 lb)   SpO2 99%   BMI 36 56 kg/m²        Physical Exam     Physical Exam   Constitutional: She is oriented to person, place, and time  She appears well-developed and well-nourished  No distress  HENT:   Head: Normocephalic and atraumatic  Right Ear: External ear normal    Left Ear: External ear normal    Nose: Nose normal    Mouth/Throat: Oropharynx is clear and moist  No oropharyngeal exudate  Eyes: Conjunctivae and EOM are normal  Right eye exhibits no discharge  Left eye exhibits no discharge  Neck: Normal range of motion  Neck supple  Cardiovascular: Normal rate, regular rhythm, normal heart sounds and intact distal pulses  No murmur heard  Pulmonary/Chest: Effort normal and breath sounds normal  No respiratory distress  She has no wheezes  She has no rales  Abdominal: Soft  Bowel sounds are normal  There is tenderness (Mild) in the epigastric area and left upper quadrant   There is no rigidity, no rebound, no guarding, no CVA tenderness, no tenderness at McBurney's point and negative Huerta's sign  Musculoskeletal: Normal range of motion  Lymphadenopathy:     She has no cervical adenopathy  Neurological: She is alert and oriented to person, place, and time  Skin: Skin is warm and dry  Psychiatric: She has a normal mood and affect  Nursing note and vitals reviewed

## 2019-07-30 ENCOUNTER — OFFICE VISIT (OUTPATIENT)
Dept: PODIATRY | Facility: CLINIC | Age: 24
End: 2019-07-30
Payer: COMMERCIAL

## 2019-07-30 VITALS — HEIGHT: 59 IN | WEIGHT: 170 LBS | BODY MASS INDEX: 34.27 KG/M2

## 2019-07-30 DIAGNOSIS — B35.1 ONYCHOMYCOSIS: ICD-10-CM

## 2019-07-30 DIAGNOSIS — B35.3 TINEA PEDIS OF BOTH FEET: Primary | ICD-10-CM

## 2019-07-30 PROCEDURE — 99203 OFFICE O/P NEW LOW 30 MIN: CPT | Performed by: PODIATRIST

## 2019-07-30 RX ORDER — TERBINAFINE HYDROCHLORIDE 250 MG/1
250 TABLET ORAL DAILY
Qty: 14 TABLET | Refills: 0 | Status: SHIPPED | OUTPATIENT
Start: 2019-07-30 | End: 2019-08-13

## 2019-07-30 NOTE — PROGRESS NOTES
PATIENT:  Erika Fernández  1995       ASSESSMENT:     1  Tinea pedis of both feet  econazole nitrate 1 % cream   2  Onychomycosis  terbinafine (LamISIL) 250 mg tablet             PLAN:  Patient was counseled and educated on the condition and the diagnosis  The diagnosis, treatment options and prognosis were discussed with the patient  She wished to proceed with pulse dose oral Lamisil  Discussed possible side effects and risks of the medication  Also start her on econazole cream for interdigital tinea pedis  Instructed proper foot care related to tinea infection  Will re-evaluate her in 3 months  Subjective:       HPI  The patient presents with chief complaint of discoloration and thickening of toenails for 2 years  They started to bother her with pressure and tenderness at times  She noticed that they have been worse  She also notices some skin peeling with mild itching around toes  She tried topical antifungal agents for the skin in the past without resolving it completely  No bleeding or drainage  Denied any swelling  No associated numbness or paresthesia  No significant weakness  The following portions of the patient's history were reviewed and updated as appropriate: allergies, current medications, past family history, past medical history, past social history, past surgical history and problem list   All pertinent labs and images were reviewed  Past Medical History  Past Medical History:   Diagnosis Date    Migraine     Placenta previa antepartum in first trimester     Varicella     As a child    Colletta Spice disease [address] [address]        Past Surgical History  Past Surgical History:   Procedure Laterality Date    CHOLECYSTECTOMY      2017    GALLBLADDER SURGERY      removal- resolved    [place]  DELIVERY ONLY N/A 2018    Procedure:  SECTION ();   Surgeon: Radha Burgess MD;  Location[facility];  Service: Obstetrics    TONSILLECTOMY in 2011        Allergies:  Aspirin    Medications:  Current Outpatient Medications   Medication Sig Dispense Refill    albuterol (PROVENTIL HFA,VENTOLIN HFA) 90 mcg/act inhaler Inhale 2 puffs every 6 (six) hours as needed for wheezing (Patient not taking: Reported on 7/30/2019) 1 Inhaler 0    aspirin-acetaminophen-caffeine (EXCEDRIN MIGRAINE) 250-250-65 MG per tablet Take 1 tablet by mouth every 6 (six) hours as needed for headaches      econazole nitrate 1 % cream Apply topically daily For 4 weeks  85 g 0    mupirocin (BACTROBAN) 2 % ointment Apply topically 3 (three) times a day (Patient not taking: Reported on 7/30/2019) 22 g 2    ondansetron (ZOFRAN-ODT) 4 mg disintegrating tablet Take 1 tablet (4 mg total) by mouth every 6 (six) hours as needed for nausea or vomiting (Patient not taking: Reported on 7/30/2019) 20 tablet 0    rizatriptan (MAXALT-MLT) 10 MG disintegrating tablet Take 1 tab at migraine onset can repeat once in 2 hours  Max 2 tabs in 24 hours  Max 2-3 days/week (Patient not taking: Reported on 7/8/2019) 12 tablet 1    terbinafine (LamISIL) 250 mg tablet Take 1 tablet (250 mg total) by mouth daily for 14 days 14 tablet 0    topiramate (TOPAMAX) 25 mg tablet 1 tab HS X 1 week, then increase by 1 tab every 1-2 weeks to an initial goal of 4 tabs HS  (Patient not taking: Reported on 7/8/2019) 120 tablet 2     No current facility-administered medications for this visit          Social History:  Social History     Socioeconomic History    Marital status: Single     Spouse name: None    Number of children: None    Years of education: None    Highest education level: None   Occupational History    None   Social Needs    Financial resource strain: None    Food insecurity:     Worry: None     Inability: None    Transportation needs:     Medical: None     Non-medical: None   Tobacco Use    Smoking status: Never Smoker    Smokeless tobacco: Never Used   Substance and Sexual Activity    Alcohol use: No    Drug use: No    Sexual activity: Yes   Lifestyle    Physical activity:     Days per week: None     Minutes per session: None    Stress: None   Relationships    Social connections:     Talks on phone: None     Gets together: None     Attends [employer] service: None     Active member of club or organization: None     Attends meetings of clubs or organizations: None     Relationship status: None    Intimate partner violence:     Fear of current or ex partner: None     Emotionally abused: None     Physically abused: None     Forced sexual activity: None   Other Topics Concern    None   Social History Narrative    None          Review of Systems   Constitutional: Negative for chills and fever  Respiratory: Negative for cough and shortness of breath  Cardiovascular: Negative for chest pain and leg swelling  Gastrointestinal: Negative for constipation, diarrhea, nausea and vomiting  Musculoskeletal: Negative for gait problem  Skin: Negative for wound  Neurological: Negative for weakness and numbness  Hematological: Does not bruise/bleed easily  Psychiatric/Behavioral: Negative for confusion  Objective:      Ht 4' 11" (1 499 m)   Wt 77 1 kg (170 lb)   BMI 34 34 kg/m²          Physical Exam   Constitutional: She is oriented to person, place, and time  She appears well-developed and well-nourished  No distress  HENT:   Head: Normocephalic and atraumatic  Neck: Normal range of motion  Neck supple  Cardiovascular: Normal rate, regular rhythm and intact distal pulses  Pulmonary/Chest: Effort normal and breath sounds normal  No respiratory distress  Musculoskeletal: Normal range of motion  She exhibits no edema, tenderness or deformity  Neurological: She is alert and oriented to person, place, and time  She displays normal reflexes  No sensory deficit  Skin: Skin is warm  Capillary refill takes less than 2 seconds     Thick, discolored nails noted mostly right 2nd/4th toe, and left 3rd/4th/5th toe  Mild discoloration on left great toenail  Skin peeling / eruption on interdigits  No wound presents  Psychiatric: She has a normal mood and affect   Her behavior is normal

## 2019-08-13 ENCOUNTER — ANNUAL EXAM (OUTPATIENT)
Dept: OBGYN CLINIC | Facility: CLINIC | Age: 24
End: 2019-08-13
Payer: COMMERCIAL

## 2019-08-13 VITALS — DIASTOLIC BLOOD PRESSURE: 70 MMHG | SYSTOLIC BLOOD PRESSURE: 118 MMHG | WEIGHT: 183 LBS | BODY MASS INDEX: 36.96 KG/M2

## 2019-08-13 DIAGNOSIS — Z01.419 ENCOUNTER FOR GYNECOLOGICAL EXAMINATION WITHOUT ABNORMAL FINDING: Primary | ICD-10-CM

## 2019-08-13 DIAGNOSIS — Z01.419 PAP SMEAR, AS PART OF ROUTINE GYNECOLOGICAL EXAMINATION: ICD-10-CM

## 2019-08-13 PROCEDURE — G0143 SCR C/V CYTO,THINLAYER,RESCR: HCPCS | Performed by: OBSTETRICS & GYNECOLOGY

## 2019-08-13 PROCEDURE — 99395 PREV VISIT EST AGE 18-39: CPT | Performed by: OBSTETRICS & GYNECOLOGY

## 2019-08-14 NOTE — PATIENT INSTRUCTIONS
Normal gynecological physical examination  Self-breast examination stressed  Discussed regular exercise, healthy diet, importance of vitamin D and calcium supplements  Discussed importance of sun block use during periods of prolonged sun exposure  Patient will be seen in 1 year for routine gynecologic and medical examination  Patient will call office for any problems, concerns, or issues which may arise during the interim    Will check pelvic ultrasound for vague pelvic discomfort

## 2019-08-14 NOTE — PROGRESS NOTES
Assessment/Plan:    No problem-specific Assessment & Plan notes found for this encounter  Diagnoses and all orders for this visit:    Encounter for gynecological examination without abnormal finding  -     Liquid-based pap, screening    Pap smear, as part of routine gynecological examination  -     Liquid-based pap, screening        Normal gynecological physical examination  Self-breast examination stressed  Discussed regular exercise, healthy diet, importance of vitamin D and calcium supplements  Discussed importance of sun block use during periods of prolonged sun exposure  Patient will be seen in 1 year for routine gynecologic and medical examination  Patient will call office for any problems, concerns, or issues which may arise during the interim  Will check pelvic ultrasound in light of the vague pelvic discomfort she is experiencing      Subjective:      Patient ID: Ayo Brito is a 25 y o  female  The pt is a 24 y/o female who presents today for her annual gynecologic wellness exam    The pt complains of some intermittent cramping pains in her lower middle abdomen around where her  surgical scar is  The cramping is like when she gets her period but occurs even when not menstruating, and feels more like a "sharp pressure " The cramping occurs about once weakly and is bearable so she does not use any pain medications to treat it  Occasionally she will use a warm compress on the area which seems to help  The pt wondered if the pain was do to her  1 year ago (Aug 2018) or from the IUD she had placed 5 months ago (2019)  The pt also has some positional vaginal discomfort during intercourse  Different lubricants were discusses and pt was educated on how hormones may be playing a role in that discomfort, since she is still breast feeding  The pt denies any breast abnormal breast changes, skin changes, lumps, or abnormal breast discharge   Pt denies heat or cold intolerance  She has no abdominal pain, appetite change, or weight change  Bowel and bladder habits are normal with no constipation, diarrhea, pain, or blood in stool/urine  Pt has no vaginal or vulval lumps, itching, or abnormal discharge  Pt is on last few days of current menses  Her flow has been slightly less since staring the IUD, but still last 5-7 days  The following portions of the patient's history were reviewed and updated as appropriate: allergies, current medications, past family history, past medical history, past social history, past surgical history and problem list     Review of Systems   Constitutional: Negative  HENT: Negative  Eyes: Negative  Respiratory: Negative  Cardiovascular: Negative  Gastrointestinal: Negative  Negative for abdominal pain, constipation and diarrhea  Occasional cramping in epigastric region   Endocrine: Negative  Negative for cold intolerance, heat intolerance and polyuria  Genitourinary: Positive for vaginal pain  Negative for dysuria, frequency, hematuria, vaginal bleeding and vaginal discharge  Vaginal pain during intercourse, positional    Musculoskeletal: Negative  Skin: Negative  Neurological: Negative  Psychiatric/Behavioral: Negative  Objective:      /70   Wt 83 kg (183 lb)   LMP  (Approximate) Comment: IUD  BMI 36 96 kg/m²          Physical Exam   Constitutional: She appears well-developed  HENT:   Head: Normocephalic  Eyes: Pupils are equal, round, and reactive to light  Neck: Normal range of motion  Neck supple  Cardiovascular: Normal rate and regular rhythm  Pulmonary/Chest: Effort normal and breath sounds normal  Right breast exhibits no inverted nipple, no mass, no nipple discharge, no skin change and no tenderness  Left breast exhibits no inverted nipple, no mass, no nipple discharge, no skin change and no tenderness  No breast swelling, tenderness, discharge or bleeding   Breasts are symmetrical    Abdominal: Soft  Normal appearance and bowel sounds are normal    Genitourinary: Rectum normal, vagina normal and uterus normal  Pelvic exam was performed with patient supine  No labial fusion  There is no rash, tenderness, lesion or injury on the right labia  There is no rash, tenderness, lesion or injury on the left labia  Cervix exhibits no motion tenderness, no discharge and no friability  Right adnexum displays no mass, no tenderness and no fullness  Left adnexum displays no mass, no tenderness and no fullness  Genitourinary Comments: Some menstrual blood present   Lymphadenopathy:     She has no cervical adenopathy  She has no axillary adenopathy  No inguinal adenopathy noted on the right or left side  Right: No inguinal and no supraclavicular adenopathy present  Left: No inguinal and no supraclavicular adenopathy present  Neurological: She is alert  Skin: Skin is intact  No rash noted  Psychiatric: She has a normal mood and affect   Her speech is normal and behavior is normal  Judgment and thought content normal  Cognition and memory are normal

## 2019-08-15 LAB
LAB AP GYN PRIMARY INTERPRETATION: NORMAL
Lab: NORMAL

## 2019-08-19 ENCOUNTER — HOSPITAL ENCOUNTER (EMERGENCY)
Facility: HOSPITAL | Age: 24
Discharge: HOME | End: 2019-08-19
Attending: EMERGENCY MEDICINE | Admitting: EMERGENCY MEDICINE
Payer: COMMERCIAL

## 2019-08-19 VITALS
HEART RATE: 90 BPM | RESPIRATION RATE: 16 BRPM | TEMPERATURE: 98.3 F | OXYGEN SATURATION: 99 % | SYSTOLIC BLOOD PRESSURE: 89 MMHG | DIASTOLIC BLOOD PRESSURE: 57 MMHG

## 2019-08-19 DIAGNOSIS — G43.909 MIGRAINE HEADACHE: Primary | ICD-10-CM

## 2019-08-19 PROCEDURE — 99282 EMERGENCY DEPT VISIT SF MDM: CPT

## 2019-08-19 PROCEDURE — 99284 EMERGENCY DEPT VISIT MOD MDM: CPT | Performed by: EMERGENCY MEDICINE

## 2019-08-19 PROCEDURE — 96375 TX/PRO/DX INJ NEW DRUG ADDON: CPT

## 2019-08-19 PROCEDURE — 96374 THER/PROPH/DIAG INJ IV PUSH: CPT

## 2019-08-19 PROCEDURE — 96361 HYDRATE IV INFUSION ADD-ON: CPT

## 2019-08-19 RX ORDER — KETOROLAC TROMETHAMINE 30 MG/ML
30 INJECTION, SOLUTION INTRAMUSCULAR; INTRAVENOUS EVERY 12 HOURS SCHEDULED
Status: DISCONTINUED | OUTPATIENT
Start: 2019-08-19 | End: 2019-08-19 | Stop reason: HOSPADM

## 2019-08-19 RX ORDER — METOCLOPRAMIDE HYDROCHLORIDE 5 MG/ML
10 INJECTION INTRAMUSCULAR; INTRAVENOUS EVERY 8 HOURS SCHEDULED
Status: DISCONTINUED | OUTPATIENT
Start: 2019-08-19 | End: 2019-08-19 | Stop reason: HOSPADM

## 2019-08-19 RX ADMIN — METOCLOPRAMIDE 10 MG: 5 INJECTION, SOLUTION INTRAMUSCULAR; INTRAVENOUS at 14:36

## 2019-08-19 RX ADMIN — SODIUM CHLORIDE 1000 ML: 0.9 INJECTION, SOLUTION INTRAVENOUS at 14:33

## 2019-08-19 RX ADMIN — KETOROLAC TROMETHAMINE 30 MG: 30 INJECTION, SOLUTION INTRAMUSCULAR at 14:35

## 2019-08-19 NOTE — ED ATTENDING ATTESTATION
Jumana Agrawal DO, saw and evaluated the patient  I have discussed the patient with the resident/non-physician practitioner and agree with the resident's/non-physician practitioner's findings, Plan of Care, and MDM as documented in the resident's/non-physician practitioner's note, except where noted  All available labs and Radiology studies were reviewed  I was present for key portions of any procedure(s) performed by the resident/non-physician practitioner and I was immediately available to provide assistance  At this point I agree with the current assessment done in the Emergency Department  I have conducted an independent evaluation of this patient a history and physical is as follows:    24 yo female c/o HA mostly R sided with subjective numbness in R face w/tingling, black floaters intermittently and subjective weakness RUE, RLE  Pt has longstanding hx of migraine, was seen by neurology in April 2019 for same, given maxalt PRN and topamax preventative  Pt has been taken off topamax for palpitations attributed to medication  Today pt c/o gradual onset R sided HA, 7/10, constant worse with light or sound exposure  Non radiating  Denies associated f/c, neck pain/stiffness  Pt has not had neuroimaging but was written for MRI as outpt in April  Imp: HA likely migraine plan: tx sx, reassess        Critical Care Time  Procedures

## 2019-08-19 NOTE — DISCHARGE INSTRUCTIONS
Today you were seen for your migraine headache which responded to medications  As discussed, you will need to be seen by neurology sooner than your scheduled appointment in October  Please call and attempt to move this appointment up  This is likely a different presentation of your chronic migraines, you may require new medications to control these at home  Return immediately to the ER if you experience confusion, seizures, severe or sudden headache that is concerning to you, severe nausea/vomiting, weakness, numbness, tingling, difficulty walking or speaking, or other neurological symptoms  Return for any chest pain, shortness of breath, abdominal pain, neck pain or stiffness  Please also see your PCP to discuss your visit to the emergency department today as they may be able to help you see neurology sooner or manage your migraine symptoms

## 2019-08-19 NOTE — ED PROVIDER NOTES
History  Chief Complaint   Patient presents with    Migraine      hx/dx of migraine  pt states these sx are not the usual sx of migraine  tingling of the right side of the face, reports diziness and nausea with one episode of vomitting at work  exederine at 0900 with no relief  26 yo F with h/o migraines for the past 1 5 years presenting today for headache that began at 8:45 AM slowly with some mild pain on her right temple then slowly started to radiate posteriorly and into the base of her right neck, denies neck stiffness  Took Excedrin Migraine at 9 AM which usually helps along with some coffee, did not relieve the headache which slowly started to get worse  Associated with intermittent mild dizziness that feels like the room is spinning around her, worse with moving her head, nausea, and NBNB emesis x1 while at work  Has some intermittent vision changes that she describes as black floaters which come and go, distributed evenly throughout her vision, denies having a usual aura or visual changes with migraines  Has significant photophobia and phonophobia which are present with her usual migraines, although does seem worse at this time  Has some right sided face slightly decreased sensation compared to her left, no difficulty moving her facial muscles or other cranial nerve deficits  Had been prescribed Topimax and rizatriptan for migraines, does not take them due to side effects  Usually uses Excedrin or Advil PRN, headaches rarely last more than a day  Does endorse up to 2 migraines a week, no recent significant change in her headache frequency  They come randomly throughout the day, do occasionally wake her from sleep  Has an appointment with neurology in October, was supposed to be seen by Dr Kiki Russell  Had MRI ordered, did not complete as she was waiting to see outpatient neurology again before proceeding  LMP 8/05/19, has not been sexually active since         History provided by: Patient   used: No    Migraine   Location:  Right side of head  Quality:  Sharp throbbing  Severity:  Moderate (Began mild, now a 7/10)  Onset quality:  Gradual  Duration:  5 hours  Timing:  Constant  Progression:  Worsening  Chronicity:  Recurrent  Relieved by:  Nothing  Worsened by:  Light, sound  Ineffective treatments:  Excedrin Migraine  Associated symptoms: headaches, nausea and vomiting (x1)    Associated symptoms: no abdominal pain, no chest pain, no congestion, no cough, no diarrhea, no ear pain, no fatigue, no fever, no loss of consciousness, no myalgias, no rash, no rhinorrhea, no shortness of breath, no sore throat and no wheezing        Prior to Admission Medications   Prescriptions Last Dose Informant Patient Reported? Taking? albuterol (PROVENTIL HFA,VENTOLIN HFA) 90 mcg/act inhaler   No No   Sig: Inhale 2 puffs every 6 (six) hours as needed for wheezing   Patient not taking: Reported on 2019   aspirin-acetaminophen-caffeine (EXCEDRIN MIGRAINE) 250-250-65 MG per tablet   Yes No   Sig: Take 1 tablet by mouth every 6 (six) hours as needed for headaches   econazole nitrate 1 % cream   No No   Sig: Apply topically daily For 4 weeks  mupirocin (BACTROBAN) 2 % ointment   No No   Sig: Apply topically 3 (three) times a day   Patient not taking: Reported on 2019   ondansetron (ZOFRAN-ODT) 4 mg disintegrating tablet   No No   Sig: Take 1 tablet (4 mg total) by mouth every 6 (six) hours as needed for nausea or vomiting   Patient not taking: Reported on 2019   rizatriptan (MAXALT-MLT) 10 MG disintegrating tablet   No No   Sig: Take 1 tab at migraine onset can repeat once in 2 hours  Max 2 tabs in 24 hours  Max 2-3 days/week   Patient not taking: Reported on 2019   topiramate (TOPAMAX) 25 mg tablet   No No   Si tab HS X 1 week, then increase by 1 tab every 1-2 weeks to an initial goal of 4 tabs HS     Patient not taking: Reported on 2019      Facility-Administered Medications: None       Past Medical History:   Diagnosis Date    Migraine     Placenta previa antepartum in first trimester     Varicella     As a child    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[address] )        Past Surgical History:   Procedure Laterality Date    CHOLECYSTECTOMY      2017    GALLBLADDER SURGERY      removal- resolved    [place]  DELIVERY ONLY N/A 2018    Procedure:  SECTION (); Surgeon: Adrian Rios MD;  Location: [facility];  Service: Obstetrics    TONSILLECTOMY      in        Family History   Problem Relation Age of Onset    Diabetes Paternal Grandmother     Hypertension Paternal Grandmother     Diabetes Paternal Grandfather     No Known Problems Mother     No Known Problems Father     No Known Problems Sister     No Known Problems Brother      I have reviewed and agree with the history as documented  Social History     Tobacco Use    Smoking status: Never Smoker    Smokeless tobacco: Never Used   Substance Use Topics    Alcohol use: No    Drug use: No        Review of Systems   Constitutional: Negative for chills, fatigue and fever  HENT: Negative for congestion, ear pain, nosebleeds, rhinorrhea, sinus pressure, sinus pain, sore throat, tinnitus, trouble swallowing and voice change  Eyes: Positive for photophobia  Negative for pain, redness and visual disturbance  Respiratory: Negative for cough, shortness of breath and wheezing  Cardiovascular: Negative for chest pain  Gastrointestinal: Positive for nausea and vomiting (x1)  Negative for abdominal pain, constipation and diarrhea  Genitourinary: Negative for difficulty urinating, dysuria, flank pain, frequency, hematuria, menstrual problem, pelvic pain, urgency and vaginal bleeding  Musculoskeletal: Positive for neck pain (right sided radiating from headache)  Negative for back pain, gait problem, myalgias and neck stiffness  Skin: Negative for rash     Allergic/Immunologic: Negative for immunocompromised state  Neurological: Positive for dizziness, numbness and headaches  Negative for tremors, seizures, loss of consciousness, syncope, facial asymmetry, speech difficulty, weakness and light-headedness  Hematological: Bruises/bleeds easily  Psychiatric/Behavioral: Negative for confusion  The patient is nervous/anxious  Physical Exam  ED Triage Vitals   Temperature Pulse Respirations Blood Pressure SpO2   08/19/19 1333 08/19/19 1333 08/19/19 1333 08/19/19 1333 08/19/19 1333   98 3 °F (36 8 °C) 88 16 157/91 99 %      Temp Source Heart Rate Source Patient Position - Orthostatic VS BP Location FiO2 (%)   08/19/19 1333 08/19/19 1333 08/19/19 1333 08/19/19 1540 --   Oral Monitor Sitting Right arm       Pain Score       08/19/19 1333       7             Orthostatic Vital Signs  Vitals:    08/19/19 1333 08/19/19 1540   BP: 157/91 (!) 89/57   Pulse: 88 90   Patient Position - Orthostatic VS: Sitting Lying       Physical Exam   Constitutional: She is oriented to person, place, and time  She appears well-developed and well-nourished  No distress  HENT:   Head: Normocephalic and atraumatic  Mouth/Throat: Oropharynx is clear and moist    Eyes: Pupils are equal, round, and reactive to light  Conjunctivae and EOM are normal    Neck: Normal range of motion  Neck supple  Cardiovascular: Normal rate, regular rhythm and normal heart sounds  No murmur heard  Pulmonary/Chest: Effort normal and breath sounds normal  No respiratory distress  She has no wheezes  She has no rales  Abdominal: Soft  Bowel sounds are normal  There is no tenderness  Musculoskeletal: Normal range of motion  She exhibits no edema or tenderness  Neurological: She is alert and oriented to person, place, and time  She displays normal reflexes  No cranial nerve deficit or sensory deficit  She exhibits normal muscle tone   Coordination normal    Romberg negative  Normal nose-finger test  Strength 5/5 in all extremities, moves symmetrically  Skin: Skin is warm and dry  No rash noted  No pallor  Psychiatric: She has a normal mood and affect  Nursing note and vitals reviewed  ED Medications  Medications   metoclopramide (REGLAN) injection 10 mg (10 mg Intravenous Given 8/19/19 1436)   ketorolac (TORADOL) injection 30 mg (30 mg Intravenous Given 8/19/19 1435)   sodium chloride 0 9 % bolus 1,000 mL (0 mL Intravenous Stopped 8/19/19 1627)       Diagnostic Studies  Results Reviewed     None                 No orders to display         Procedures  Procedures        ED Course  ED Course as of Aug 19 1639   Mon Aug 19, 2019   1527 Patient reports pain now down to 3/10, feeling ready to go home  Denies nausea, dizziness at this time  MDM  Number of Diagnoses or Management Options  Migraine headache: established and worsening  Diagnosis management comments: 24 yo F with h/o migraines, Von Willebrand disease, presenting for abnormal presentation of her usual migraines with significant response to IV toradol, reglan, and 1 L NS   -At time of discharge, headache down to 3/10 and nausea resolved  No episodes of emesis or visual changes since arrival to ED    -Patient understanding of plan going forward, will call neurology tomorrow and attempt to get earlier appointment in setting of severe migraine  Has not received outpatient MRI yet, may also need to discuss prophylactic treatment at home    -Message sent to  Kimberly Hope for help coordinating earlier appointment, will reach out with difficulties scheduling    -Discussed return precautions before discharge, also written on discharge papers       Risk of Complications, Morbidity, and/or Mortality  Presenting problems: minimal  Diagnostic procedures: minimal  Management options: minimal    Patient Progress  Patient progress: improved      Disposition  Final diagnoses:   Migraine headache     Time reflects when diagnosis was documented in both MDM as applicable and the Disposition within this note     Time User Action Codes Description Comment    8/19/2019  3:51 PM Rivard, Garland Bence Add [G43 [id number]] Migraine headache       ED Disposition     ED Disposition Condition Date/Time Comment    Discharge Good Mon Aug 19, 2019  3:51 PM [facility] discharge to home/self care  Follow-up Information     Follow up With Specialties Details Why [address] [facility]  For reevaluation as we discussed  [address] [facility]  [address] [place], [place] [address]andrzej Hay,  Neurology Schedule an appointment as soon as possible for a visit in 1 week Please move your appointment in October for as soon as possbile to discuss your ER visit  [address]  [phone]            Discharge Medication List as of 8/19/2019  3:56 PM      CONTINUE these medications which have NOT CHANGED    Details   albuterol (PROVENTIL HFA,VENTOLIN HFA) 90 mcg/act inhaler Inhale 2 puffs every 6 (six) hours as needed for wheezing, Starting Tue 7/9/2019, Normal      aspirin-acetaminophen-caffeine (EXCEDRIN MIGRAINE) 250-250-65 MG per tablet Take 1 tablet by mouth every 6 (six) hours as needed for headaches, Historical Med      econazole nitrate 1 % cream Apply topically daily For 4 weeks  , Starting Tue 7/30/2019, Normal      mupirocin (BACTROBAN) 2 % ointment Apply topically 3 (three) times a day, Starting Mon 7/8/2019, Normal      ondansetron (ZOFRAN-ODT) 4 mg disintegrating tablet Take 1 tablet (4 mg total) by mouth every 6 (six) hours as needed for nausea or vomiting, Starting Tue 7/9/2019, Normal      rizatriptan (MAXALT-MLT) 10 MG disintegrating tablet Take 1 tab at migraine onset can repeat once in 2 hours  Max 2 tabs in 24 hours   Max 2-3 days/week, Normal      topiramate (TOPAMAX) 25 mg tablet 1 tab HS X 1 week, then increase by 1 tab every 1-2 weeks to an initial goal of 4 tabs HS , Normal           No discharge procedures on file  ED Provider  Attending physically available and evaluated [facility]  I managed the patient along with the ED Attending      Electronically Signed by         Nellie Scott MD  08/19/19 [address], [place]  08/19/19 2652

## 2019-08-20 NOTE — SOCIAL WORK
CM consulted for ED f/u as Pt has f/u with neuro in October but should be seen sooner  CM attempted to contact Pt at [phone]  No answer  CM left VM with contact information for return call

## 2019-08-22 ENCOUNTER — TELEPHONE (OUTPATIENT)
Dept: NEUROLOGY | Facility: CLINIC | Age: 24
End: 2019-08-22

## 2019-08-22 DIAGNOSIS — G43.719 INTRACTABLE CHRONIC MIGRAINE WITHOUT AURA AND WITHOUT STATUS MIGRAINOSUS: Primary | ICD-10-CM

## 2019-08-22 RX ORDER — DEXAMETHASONE 1 MG
TABLET ORAL
Qty: 7 TABLET | Refills: 0 | Status: SHIPPED | OUTPATIENT
Start: 2019-08-22 | End: 2019-11-05

## 2019-08-22 RX ORDER — NORTRIPTYLINE HYDROCHLORIDE 10 MG/1
CAPSULE ORAL
Qty: 60 CAPSULE | Refills: 1 | Status: SHIPPED | OUTPATIENT
Start: 2019-08-22 | End: 2019-11-05

## 2019-08-22 NOTE — TELEPHONE ENCOUNTER
Called and spoke with pt sent  Decadron and pamelor    Can we find out why her MRI is still pending authorization  She states she was told our office should call      Thanks

## 2019-08-22 NOTE — TELEPHONE ENCOUNTER
Patient c/o of more frequent migraines with aura  Was at ED for [facility] 8/19  Received IV toradol, reglan, and 1 L NS  Patient stated this did help  Frequency increased this past month  Light flashing in her eyes with nausea and vomiting  Not taking topiramate (heart palpitations and SOB)  doesn't take the rizatriptan because she's at work when they start and doesn't have medication with her  Has been using OTCs very frequently; I did advise she stop using them  She verbalized understanding  Did not have MRI done yet (because of insurance issue)  Patient stated she was told to get in sooner than October to see Dr Pierre Monday    Please advise

## 2019-08-23 DIAGNOSIS — G43.719 INTRACTABLE CHRONIC MIGRAINE WITHOUT AURA AND WITHOUT STATUS MIGRAINOSUS: ICD-10-CM

## 2019-08-23 RX ORDER — RIZATRIPTAN BENZOATE 10 MG/1
TABLET, ORALLY DISINTEGRATING ORAL
Qty: 12 TABLET | Refills: 0 | Status: SHIPPED | OUTPATIENT
Start: 2019-08-23 | End: 2019-11-05

## 2019-09-27 ENCOUNTER — TELEPHONE (OUTPATIENT)
Dept: OBGYN CLINIC | Facility: CLINIC | Age: 24
End: 2019-09-27

## 2019-10-21 ENCOUNTER — OFFICE VISIT (OUTPATIENT)
Dept: URGENT CARE | Facility: MEDICAL CENTER | Age: 24
End: 2019-10-21
Payer: COMMERCIAL

## 2019-10-21 VITALS
HEART RATE: 70 BPM | OXYGEN SATURATION: 97 % | SYSTOLIC BLOOD PRESSURE: 107 MMHG | DIASTOLIC BLOOD PRESSURE: 67 MMHG | BODY MASS INDEX: 35.51 KG/M2 | TEMPERATURE: 97.6 F | RESPIRATION RATE: 14 BRPM | WEIGHT: 176.15 LBS | HEIGHT: 59 IN

## 2019-10-21 DIAGNOSIS — R10.9 ABDOMINAL PAIN, VOMITING, AND DIARRHEA: Primary | ICD-10-CM

## 2019-10-21 DIAGNOSIS — R19.7 ABDOMINAL PAIN, VOMITING, AND DIARRHEA: Primary | ICD-10-CM

## 2019-10-21 DIAGNOSIS — R11.10 ABDOMINAL PAIN, VOMITING, AND DIARRHEA: Primary | ICD-10-CM

## 2019-10-21 PROCEDURE — 99213 OFFICE O/P EST LOW 20 MIN: CPT | Performed by: PHYSICIAN ASSISTANT

## 2019-10-21 PROCEDURE — S9088 SERVICES PROVIDED IN URGENT: HCPCS | Performed by: PHYSICIAN ASSISTANT

## 2019-10-21 RX ORDER — ONDANSETRON 4 MG/1
4 TABLET, ORALLY DISINTEGRATING ORAL EVERY 6 HOURS PRN
Qty: 20 TABLET | Refills: 0 | Status: SHIPPED | OUTPATIENT
Start: 2019-10-21 | End: 2019-11-05

## 2019-10-21 RX ORDER — LOPERAMIDE HYDROCHLORIDE 2 MG/1
2 TABLET ORAL 4 TIMES DAILY PRN
Qty: 30 TABLET | Refills: 0 | Status: SHIPPED | OUTPATIENT
Start: 2019-10-21 | End: 2019-11-05

## 2019-10-21 NOTE — PROGRESS NOTES
[facility]        NAME: Claudia Horn is a 25 y o  female  : 1995    MRN: [id number]  DATE: 2019  TIME: 6:11 PM    Assessment and Plan   Abdominal pain, vomiting, and diarrhea [R10 9, R11 10, R19 7]  1  Abdominal pain, vomiting, and diarrhea  ondansetron (ZOFRAN-ODT) 4 mg disintegrating tablet    loperamide (IMODIUM A-D) 2 MG tablet         Patient Instructions     Take medications as directed for symptomatic relief  Tylenol as needed for fevers aches and pains  Drink plenty of fluids and stay well hydrated  [place] diet and progress as symptoms improve  Follow up with PCP in 3-5 days  Proceed to  ER if symptoms worsen  Chief Complaint     Chief Complaint   Patient presents with    Abdominal Pain     Patient reported she has been having abdominal cramping, nausea and vomiting since Saturday morning  She reports she started having diarrha Kelvin morning and unable to keep anything down  Patient reports last night and today she has beening feeling weak and lightheaded  Patient has not taken any medications and has been attempting to stay hydrated with [facility] however unable to keep it down  Patient reports the abdominal pain is a cramping type sensatiion which is constant 7 out of 10 pain  History of Present Illness       15-year-old female presents with abdominal pain nausea vomiting and diarrhea  Symptoms started initially on Saturday night  Patient noticed that she started having more nausea abdominal pain pain  Then on  started with vomiting and diarrhea and continues with today  Has not take any medications for symptoms  Has been trying to stay hydrated by drinking some Gatorade  Denies any blood in vomitus or diarrhea  Denies any chest pain or shortness of breath or cough  No sore throat or ear pain  Does have some upper abdominal discomfort  Previous gallbladder surgery  Has not taken anything for symptoms  Reports fever on Saturday night    Patient denies any chance of pregnancy    Abdominal Pain   This is a new problem  The current episode started in the past 7 days  The onset quality is gradual  The problem has been waxing and waning  The pain is located in the epigastric region and LUQ  The pain is moderate  The quality of the pain is aching  The abdominal pain does not radiate  Associated symptoms include anorexia, diarrhea, a fever, nausea and vomiting  Pertinent negatives include no arthralgias, belching, constipation, dysuria, frequency, headaches, hematochezia, melena or myalgias  The pain is aggravated by eating  The pain is relieved by nothing  She has tried nothing for the symptoms  The treatment provided no relief  Her past medical history is significant for abdominal surgery (Cholecystectomy)  Review of Systems   Review of Systems   Constitutional: Positive for fever  HENT: Negative  Eyes: Negative  Respiratory: Negative  Cardiovascular: Negative  Gastrointestinal: Positive for abdominal pain, anorexia, diarrhea, nausea and vomiting  Negative for constipation, hematochezia and melena  Genitourinary: Negative for dysuria and frequency  Musculoskeletal: Negative  Negative for arthralgias and myalgias  Skin: Negative  Neurological: Negative  Negative for headaches  Current Medications       Current Outpatient Medications:     albuterol (PROVENTIL HFA,VENTOLIN HFA) 90 mcg/act inhaler, Inhale 2 puffs every 6 (six) hours as needed for wheezing (Patient not taking: Reported on 7/30/2019), Disp: 1 Inhaler, Rfl: 0    aspirin-acetaminophen-caffeine (EXCEDRIN MIGRAINE) 250-250-65 MG per tablet, Take 1 tablet by mouth every 6 (six) hours as needed for headaches, Disp: , Rfl:     dexamethasone (DECADRON) 1 mg tablet, Take 2 tabs daily x 2 days, then 1 tab daily x 2 days, then half tab x 2 days   Take in AM with food (Patient not taking: Reported on 10/21/2019), Disp: 7 tablet, Rfl: 0    econazole nitrate 1 % cream, Apply topically daily For 4 weeks  (Patient not taking: Reported on 10/21/2019), Disp: 85 g, Rfl: 0    loperamide (IMODIUM A-D) 2 MG tablet, Take 1 tablet (2 mg total) by mouth 4 (four) times a day as needed for diarrhea, Disp: 30 tablet, Rfl: 0    mupirocin (BACTROBAN) 2 % ointment, Apply topically 3 (three) times a day (Patient not taking: Reported on 7/30/2019), Disp: 22 g, Rfl: 2    nortriptyline (PAMELOR) 10 mg capsule, 1 tab nightly x 1 wk then increase to 2 tabs nightly as needed/tolerated (Patient not taking: Reported on 10/21/2019), Disp: 60 capsule, Rfl: 1    ondansetron (ZOFRAN-ODT) 4 mg disintegrating tablet, Take 1 tablet (4 mg total) by mouth every 6 (six) hours as needed for nausea or vomiting (Patient not taking: Reported on 7/30/2019), Disp: 20 tablet, Rfl: 0    ondansetron (ZOFRAN-ODT) 4 mg disintegrating tablet, Take 1 tablet (4 mg total) by mouth every 6 (six) hours as needed for nausea or vomiting, Disp: 20 tablet, Rfl: 0    rizatriptan (MAXALT-MLT) 10 MG disintegrating tablet, Take 1 tab at migraine onset can repeat once in 2 hours  Max 2 tabs in 24 hours  Max 2-3 days/week (Patient not taking: Reported on 10/21/2019), Disp: 12 tablet, Rfl: 0    topiramate (TOPAMAX) 25 mg tablet, 1 tab HS X 1 week, then increase by 1 tab every 1-2 weeks to an initial goal of 4 tabs HS   (Patient not taking: Reported on 7/8/2019), Disp: 120 tablet, Rfl: 2    Current Allergies     Allergies as of 10/21/2019 - Reviewed 10/21/2019   Allergen Reaction Noted    Aspirin  06/24/2017    Topamax [topiramate]  08/22/2019            The following portions of the patient's history were reviewed and updated as appropriate: allergies, current medications, past family history, past medical history, past social history, past surgical history and problem list      Past Medical History:   Diagnosis Date    Migraine     Placenta previa antepartum in first trimester     Varicella     As a child    Von Willebrand disease [facility])        Past Surgical History:   Procedure Laterality Date    CHOLECYSTECTOMY      2017    GALLBLADDER SURGERY      removal- resolved    NC  DELIVERY ONLY N/A 2018    Procedure:  SECTION (); Surgeon: Miranda Rivera MD;  Location: [facility];  Service: Obstetrics    TONSILLECTOMY      in        Family History   Problem Relation Age of Onset    Diabetes Paternal Grandmother     Hypertension Paternal Grandmother     Diabetes Paternal Grandfather     No Known Problems Mother     No Known Problems Father     No Known Problems Sister     No Known Problems Brother          Medications have been verified  Objective   /67 (BP Location: Right arm, Patient Position: Sitting, Cuff Size: Adult)   Pulse 70   Temp 97 6 °F (36 4 °C) (Tympanic)   Resp 14   Ht 4' 11" (1 499 m)   Wt 79 9 kg (176 lb 2 4 oz)   SpO2 97%   BMI 35 58 kg/m²        Physical Exam     Physical Exam   Constitutional: She is oriented to person, place, and time  She appears well-developed and well-nourished  No distress  HENT:   Head: Normocephalic and atraumatic  Right Ear: Hearing, tympanic membrane, external ear and ear canal normal    Left Ear: Hearing, tympanic membrane, external ear and ear canal normal    Nose: Nose normal    Mouth/Throat: Uvula is midline, oropharynx is clear and moist and mucous membranes are normal  No oropharyngeal exudate  Eyes: Conjunctivae and EOM are normal  Right eye exhibits no discharge  Left eye exhibits no discharge  Neck: Normal range of motion  Neck supple  Cardiovascular: Normal rate, regular rhythm, normal heart sounds and intact distal pulses  No murmur heard  Pulmonary/Chest: Effort normal and breath sounds normal  No respiratory distress  She has no wheezes  She has no rales  Abdominal: Soft  Bowel sounds are normal  There is no hepatosplenomegaly, splenomegaly or hepatomegaly   There is generalized tenderness (Mild to moderate surgical scars noted to right upper quadrant area) and tenderness in the left upper quadrant  There is no rigidity, no rebound, no guarding, no CVA tenderness, no tenderness at McBurney's point and negative Huerta's sign  No hernia  Musculoskeletal: Normal range of motion  Lymphadenopathy:     She has no cervical adenopathy  Neurological: She is alert and oriented to person, place, and time  Skin: Skin is warm and dry  Psychiatric: She has a normal mood and affect  Nursing note and vitals reviewed

## 2019-10-21 NOTE — PATIENT INSTRUCTIONS
Take medications as directed for symptomatic relief  Tylenol as needed for fevers aches and pains  Drink plenty of fluids and stay well hydrated  [place] diet and progress as symptoms improve  Follow up with PCP in 3-5 days  Proceed to  ER if symptoms worsen  Abdominal Pain   AMBULATORY CARE:   Abdominal pain  can be dull, achy, or sharp  You may have pain in one area of your abdomen, or in your entire abdomen  Your pain may be caused by a condition such as constipation, food sensitivity or poisoning, infection, or a blockage  Abdominal pain can also be from a hernia, appendicitis, or an ulcer  Liver, gallbladder, or kidney conditions can also cause abdominal pain  The cause of your abdominal pain may be unknown  Seek care immediately if:   · You have new chest pain or shortness of breath  · You have pulsing pain in your upper abdomen or lower back that suddenly becomes constant  · Your pain is in the right lower abdominal area and worsens with movement  · You have a fever over 100 4°F (38°C) or shaking chills  · You are vomiting and cannot keep food or liquids down  · Your pain does not improve or gets worse over the next 8 to 12 hours  · You see blood in your vomit or bowel movements, or they look black and tarry  · Your skin or the whites of your eyes turn yellow  · You are a woman and have a large amount of vaginal bleeding that is not your monthly period  Contact your healthcare provider if:   · You have pain in your lower back  · You are a man and have pain in your testicles  · You have pain when you urinate  · You have questions or concerns about your condition or care  Treatment for abdominal pain  may include medicine to calm your stomach, prevent vomiting, or decrease pain  Follow up with your healthcare provider as directed:  Write down your questions so you remember to ask them during your visits     © 2017 [employer] Information is for End User's use only and may not be sold, redistributed or otherwise used for commercial purposes  All illustrations and images included in CareNotes® are the copyrighted property of [employer]  or [employer]  The above information is an  only  It is not intended as medical advice for individual conditions or treatments  Talk to your doctor, nurse or pharmacist before following any medical regimen to see if it is safe and effective for you  Acute Diarrhea   AMBULATORY CARE:   Acute diarrhea  starts quickly and lasts a short time, usually 1 to 3 days  It can last up to 2 weeks  Signs and symptoms that may happen with diarrhea:  You may have 3 or more episodes of diarrhea  It may be hard to control your diarrhea  You may also have any of the following:  · Fever and chills    · Headache or abdominal pain    · Nausea and vomiting    · Symptoms of dehydration such as thirst, decreased urination, dry skin, sunken eyes, or fast, pounding heartbeat  Seek care immediately if:   · You feel confused  · Your heartbeat is faster than normal      · Your eyes look deeply sunken, or you have no tears when you cry  · You urinate less than usual, or your urine is dark yellow  · You have blood or mucus in your stools  · You have severe abdominal pain  · You are unable to drink any liquids  Contact your healthcare provider if:  · Your symptoms do not get better with treatment  · You have a fever higher than 101 3°F (38 5°C)  · You have trouble eating and drinking because you are vomiting  · You are thirsty or have a dry mouth  · Your diarrhea does not get better in 7 days  · You have questions or concerns about your condition or care  Treatment for acute diarrhea  may include medicines to slow or stop your diarrhea  You may also need medicine to treat an infection  Self-care:   · Drink liquids as directed    Liquids will help prevent dehydration caused by diarrhea  Ask your healthcare provider how much liquid to drink each day and which liquids are best for you  You may need to drink an oral rehydration solution (ORS)  An ORS has the right amounts of water, salts, and sugar you need to replace body fluids  You can buy an ORS at most grocery stores and pharmacies  · Eat foods that are easy to digest   Examples include rice, lentils, cereal, bananas, potatoes, and bread  It also includes some fruits (bananas, melon), well-cooked vegetables, and lean meats  Avoid foods high in fiber, fat, and sugar  Also avoid caffeine, alcohol, dairy, and red meat until your diarrhea is gone  Prevent diarrhea:   · Wash your hands often  Use soap and water  Wash your hands before you eat or prepare food  Also wash your hands after you use the bathroom  Use an alcohol-based hand gel when soap and water are not available  · Keep bathroom surfaces clean  This helps prevent the spread of germs that cause acute diarrhea  · Wash fruits and vegetables well before you eat them  This can help remove germs that cause diarrhea  If possible, remove the skin from fruits and vegetables, or cook them well before you eat them  · Cook meat as directed  ¨ Cook ground meat  to 160°F      ¨ Cook ground poultry, whole poultry, or cuts of poultry  to at least 165°F  Remove the meat from heat  Let it stand for 3 minutes before you eat it  ¨ Cook whole cuts of meat other than poultry  to at least 145°F  Remove the meat from heat  Let it stand for 3 minutes before you eat it  · Wash dishes that have touched raw meat with hot water and soap  This includes cutting boards, utensils, dishes, and serving containers  · Place raw or cooked meat in the refrigerator as soon as possible  Bacteria can grow in meat that is left at room temperature too long  · Do not eat raw or undercooked oysters, clams, or mussels  These foods may be contaminated and cause infection       · Drink filtered or treated water only when you travel  Do not put ice in your drinks  Drink bottled water whenever possible  Follow up with your healthcare provider as directed:  Write down your questions so you remember to ask them during your visits  © 2017 [facility] Information is for End User's use only and may not be sold, redistributed or otherwise used for commercial purposes  All illustrations and images included in CareNotes® are the copyrighted property of [employer]  or [employer]  The above information is an  only  It is not intended as medical advice for individual conditions or treatments  Talk to your doctor, nurse or pharmacist before following any medical regimen to see if it is safe and effective for you  Acute Nausea and Vomiting   AMBULATORY CARE:   Acute nausea and vomiting  starts suddenly, gets worse quickly, and lasts a short time  Common causes include pregnancy, alcohol, infection, and medicines  A head injury, heart attack, or inner ear imbalance can also cause acute nausea and vomiting  Seek care immediately if:   · You see blood in your vomit or your bowel movements  · You have sudden, severe pain in your chest and upper abdomen after hard vomiting or retching  · You have swelling in your neck and chest      · You are dizzy, cold, and thirsty and your eyes and mouth are dry  · You are urinating very little or not at all  · You have muscle weakness, leg cramps, and trouble breathing  · Your heart is beating much faster than normal      · You continue to vomit for more than 48 hours  Contact your healthcare provider if:   · You have frequent dry heaves (vomiting but nothing comes out)  · Your nausea and vomiting does not get better or go away after you use medicine  · You have questions or concerns about your condition or treatment    Treatment for acute nausea and vomiting  may include medicines to calm your stomach and stop the vomiting  You may need IV fluids if you are dehydrated  Prevent or manage acute nausea and vomiting:   · Do not drink alcohol  Alcohol may upset or irritate your stomach  Too much alcohol can also cause acute nausea and vomiting  · Control stress  Headaches due to stress may cause nausea and vomiting  Find ways to relax and manage your stress  Get more rest and sleep  · Drink more liquids as directed  Vomiting can lead to dehydration  It is important to drink more liquids to help replace lost body fluids  Ask your healthcare provider how much liquid to drink each day and which liquids are best for you  Your provider may recommend that you drink an oral rehydration solution (ORS)  ORS contains water, salts, and sugar that are needed to replace the lost body fluids  Ask what kind of ORS to use, how much to drink, and where to get it  · Eat smaller meals, more often  Eat small amounts of food every 2 to 3 hours, even if you are not hungry  Food in your stomach may decrease your nausea  · Talk to your healthcare provider before you take over-the-counter (OTC) medicines  These medicines can cause serious problems if you use certain other medicines, or you have a medical condition  You may have problems if you use too much or use them for longer than the label says  Follow directions on the label carefully  Follow up with your healthcare provider as directed:  Write down your questions so you remember to ask them during your visits  © 2017 [employer] Information is for End User's use only and may not be sold, redistributed or otherwise used for commercial purposes  All illustrations and images included in CareNotes® are the copyrighted property of [employer]  or [employer]  The above information is an  only  It is not intended as medical advice for individual conditions or treatments   Talk to your doctor, nurse or pharmacist before following any medical regimen to see if it is safe and effective for you  Gastroenteritis   AMBULATORY CARE:   Gastroenteritis , or stomach flu, is an infection of the stomach and intestines  It is caused by bacteria, parasites, or viruses  Common symptoms include the following:   · Diarrhea or gas    · Nausea, vomiting, or poor appetite    · Abdominal cramps, pain, or gurgling    · Fever    · Tiredness or weakness    · Headaches or muscle aches with any of the above symptoms  Call 911 for any of the following:   · You have trouble breathing or a very fast pulse  Seek care immediately if:   · You see blood in your diarrhea  · You cannot stop vomiting  · You have not urinated for 12 hours  · You feel like you are going to faint  Contact your healthcare provider if:   · You have a fever  · You continue to vomit or have diarrhea, even after treatment  · You see worms in your diarrhea  · Your mouth or eyes are dry  You are not urinating as much or as often  · You have questions or concerns about your condition or care  Treatment for gastroenteritis  may include medicines to slow or stop your diarrhea or vomiting  You may also need medicines to treat an infection caused by bacteria or a parasite  Manage your symptoms:   · Drink liquids as directed  Ask your healthcare provider how much liquid to drink each day, and which liquids are best for you  You may also need to drink an oral rehydration solution (ORS)  An ORS has the right amounts of sugar, salt, and minerals in water to replace body fluids  · Eat bland foods  When you feel hungry, begin eating soft, bland foods  Examples are bananas, clear soup, potatoes, and applesauce  Do not have dairy products, alcohol, sugary drinks, or drinks with caffeine until you feel better  · Rest as much as possible  Slowly start to do more each day when you begin to feel better    Prevent the spread of germs:  Gastroenteritis can spread easily  Keep yourself, your family, and your surroundings clean to help prevent the spread of gastroenteritis:  · Wash your hands often  Use soap and water  Wash your hands after you use the bathroom, change a child's diapers, or sneeze  Wash your hands before you prepare or eat food  · Clean surfaces and do laundry often  Wash your clothes and towels separately from the rest of the laundry  Clean surfaces in your home with antibacterial  or bleach  · Clean food thoroughly and cook safely  Wash raw vegetables before you cook  Cook meat, fish, and eggs fully  Do not use the same dishes for raw meat as you do for other foods  Refrigerate any leftover food immediately  · Be aware when you camp or travel  Drink only clean water  Do not drink from rivers or lakes unless you purify or boil the water first  When you travel, drink bottled water and do not add ice  Do not eat fruit that has not been peeled  Do not eat raw fish or meat that is not fully cooked  Follow up with your healthcare provider as directed:  Write down your questions so you remember to ask them during your visits  © 2017 [facility] [facility] Information is for End User's use only and may not be sold, redistributed or otherwise used for commercial purposes  All illustrations and images included in CareNotes® are the copyrighted property of [employer]  or [employer]  The above information is an  only  It is not intended as medical advice for individual conditions or treatments  Talk to your doctor, nurse or pharmacist before following any medical regimen to see if it is safe and effective for you

## 2019-10-21 NOTE — LETTER
October 21, 2019     Patient: Marce Murray   YOB: 1995   Date of Visit: 10/21/2019       To Whom it May Concern:    Marce Murray was seen in my clinic on 10/21/2019  She may return to work on 10/24/2019  Patient may return sooner if symptoms have improved  If you have any questions or concerns, please don't hesitate to call  Sincerely,          Lopez Johnson PA-C        CC: No Recipients

## 2019-11-04 ENCOUNTER — TELEPHONE (OUTPATIENT)
Dept: NEUROLOGY | Facility: CLINIC | Age: 24
End: 2019-11-04

## 2019-11-05 ENCOUNTER — OFFICE VISIT (OUTPATIENT)
Dept: URGENT CARE | Facility: MEDICAL CENTER | Age: 24
End: 2019-11-05
Payer: COMMERCIAL

## 2019-11-05 VITALS
TEMPERATURE: 97.7 F | WEIGHT: 170 LBS | RESPIRATION RATE: 16 BRPM | HEIGHT: 59 IN | HEART RATE: 88 BPM | OXYGEN SATURATION: 98 % | SYSTOLIC BLOOD PRESSURE: 118 MMHG | DIASTOLIC BLOOD PRESSURE: 70 MMHG | BODY MASS INDEX: 34.27 KG/M2

## 2019-11-05 DIAGNOSIS — H60.92 OTITIS EXTERNA OF LEFT EAR, UNSPECIFIED CHRONICITY, UNSPECIFIED TYPE: Primary | ICD-10-CM

## 2019-11-05 PROCEDURE — S9088 SERVICES PROVIDED IN URGENT: HCPCS | Performed by: FAMILY MEDICINE

## 2019-11-05 PROCEDURE — 99213 OFFICE O/P EST LOW 20 MIN: CPT | Performed by: FAMILY MEDICINE

## 2019-11-05 RX ORDER — AMOXICILLIN 875 MG/1
875 TABLET, COATED ORAL 2 TIMES DAILY
Qty: 20 TABLET | Refills: 0 | Status: SHIPPED | OUTPATIENT
Start: 2019-11-05 | End: 2019-11-08 | Stop reason: ALTCHOICE

## 2019-11-05 RX ORDER — CIPROFLOXACIN AND DEXAMETHASONE 3; 1 MG/ML; MG/ML
4 SUSPENSION/ DROPS AURICULAR (OTIC) 2 TIMES DAILY
Qty: 7.5 ML | Refills: 0 | Status: SHIPPED | OUTPATIENT
Start: 2019-11-05 | End: 2019-11-06

## 2019-11-06 NOTE — PATIENT INSTRUCTIONS
I prescribed amoxicillin 875 mg twice a day for 10 days  Also prescribed Ciprodex ear drops 4 drops into left ear twice a day for 7 days  I placed awaken to left ear canal and advised to have it I placed within 24-48 hours  She expressed understanding  Otitis Externa   WHAT YOU NEED TO KNOW:   Otitis externa, or swimmer's ear, is an infection in the outer ear canal  This canal goes from the outside of the ear to the eardrum  DISCHARGE INSTRUCTIONS:   Return to the emergency department if:   · You have severe ear pain  · You are suddenly unable to hear at all  · You have new swelling in your face, behind your ears, or in your neck  · You suddenly cannot move part of your face  · Your face suddenly feels numb  Contact your healthcare provider if:   · You have a fever  · Your signs and symptoms do not get better after 2 days of treatment  · Your signs and symptoms go away for a time, but then come back  · You have questions or concerns about your condition or care  Medicines:   · NSAIDs , such as ibuprofen, help decrease swelling, pain, and fever  This medicine is available with or without a doctor's order  NSAIDs can cause stomach bleeding or kidney problems in certain people  If you take blood thinner medicine, always ask if NSAIDs are safe for you  Always read the medicine label and follow directions  Do not give these medicines to children under 10months of age without direction from your child's healthcare provider  · Acetaminophen  decreases pain and fever  It is available without a doctor's order  Ask how much to take and how often to take it  Follow directions  Acetaminophen can cause liver damage if not taken correctly  · Ear drops  that contain an antibiotic may be given  The antibiotic helps treat a bacterial infection  You may also be given steroid medicine  The steroid helps decrease redness, swelling, and pain  · Take your medicine as directed    Contact your healthcare provider if you think your medicine is not helping or if you have side effects  Tell him or her if you are allergic to any medicine  Keep a list of the medicines, vitamins, and herbs you take  Include the amounts, and when and why you take them  Bring the list or the pill bottles to follow-up visits  Carry your medicine list with you in case of an emergency  Follow up with your healthcare provider as directed:  Write down your questions so you remember to ask them during your visits  How to use eardrops:   · Lie down on your side with your infected ear facing up  · Carefully drip the correct number of eardrops into your ear  Have another person help you if possible  · Gently move the outside part of your ear back and forth to help the medicine reach your ear canal      · Stay lying down in the same position (with your ear facing up) for 3 to 5 minutes  Prevent otitis externa:   · Do not put cotton swabs or foreign objects in your ears  · Wrap a clean moist washcloth around your finger, and use it to clean your outer ear and remove extra ear wax  · Use ear plugs when you swim  Dry your outer ears completely after you swim or bathe  © 2017 [employer]  Information is for End User's use only and may not be sold, redistributed or otherwise used for commercial purposes  All illustrations and images included in CareNotes® are the copyrighted property of [employer]  or [employer]  The above information is an  only  It is not intended as medical advice for individual conditions or treatments  Talk to your doctor, nurse or pharmacist before following any medical regimen to see if it is safe and effective for you

## 2019-11-06 NOTE — PROGRESS NOTES
[facility]        NAME: Liane Rodrigues is a 25 y o  female  : 1995    MRN: [id number]  DATE: 2019  TIME: 8:15 PM    Assessment and Plan   Otitis externa of left ear, unspecified chronicity, unspecified type [H60 92]  1  Otitis externa of left ear, unspecified chronicity, unspecified type  amoxicillin (AMOXIL) 875 mg tablet    ciprofloxacin-dexamethasone (CIPRODEX) otic suspension         Patient Instructions       Follow up with PCP in 3-5 days  Proceed to  ER if symptoms worsen  Chief Complaint     Chief Complaint   Patient presents with    Earache     10/10 left ear pain with drainage and vertigo x 3 days  History of Present Illness       25year-old with 5 day history of left ear drainage  However she has developed pain in last 3 days  Describes some mucopurulent discharge  Denies blood  She has been taking ibuprofen for pain with no noticeable improvement  She has a known history of recurrent ear infection  She apply some drops over prescribed upon her previous ENT specialist with a known noticeable improvement  Also complaining of some dizziness which is mild at the present time  Review of Systems   Review of Systems   HENT: Positive for ear discharge and ear pain  Neurological: Positive for dizziness           Current Medications       Current Outpatient Medications:     amoxicillin (AMOXIL) 875 mg tablet, Take 1 tablet (875 mg total) by mouth 2 (two) times a day for 10 days, Disp: 20 tablet, Rfl: 0    ciprofloxacin-dexamethasone (CIPRODEX) otic suspension, Administer 4 drops into the left ear 2 (two) times a day for 7 days, Disp: 7 5 mL, Rfl: 0    Current Allergies     Allergies as of 2019 - Reviewed 2019   Allergen Reaction Noted    Aspirin  2017    Topamax [topiramate]  2019            The following portions of the patient's history were reviewed and updated as appropriate: allergies, current medications, past family history, past medical history, past social history, past surgical history and problem list      Past Medical History:   Diagnosis Date    Migraine     Placenta previa antepartum in first trimester     Varicella     As a child    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[address] )        Past Surgical History:   Procedure Laterality Date    CHOLECYSTECTOMY      2017    GALLBLADDER SURGERY      removal- resolved    MA  DELIVERY ONLY N/A 2018    Procedure:  SECTION (); Surgeon: Javier Giron MD;  [facility];  Service: Obstetrics    TONSILLECTOMY      in        Family History   Problem Relation Age of Onset    Diabetes Paternal Grandmother     Hypertension Paternal Grandmother     Diabetes Paternal Grandfather     No Known Problems Mother     No Known Problems Father     No Known Problems Sister     No Known Problems Brother          Medications have been verified  Objective   /70   Pulse 88   Temp 97 7 °F (36 5 °C)   Resp 16   Ht 4' 11" (1 499 m)   Wt 77 1 kg (170 lb)   SpO2 98%   BMI 34 34 kg/m²        Physical Exam     Physical Exam   HENT:   Right Ear: External ear normal    Tenderness of the left tragus  She markedly swollen left ear canal consistent with otitis externa

## 2019-12-12 ENCOUNTER — OFFICE VISIT (OUTPATIENT)
Dept: URGENT CARE | Facility: MEDICAL CENTER | Age: 24
End: 2019-12-12
Payer: COMMERCIAL

## 2019-12-12 VITALS
RESPIRATION RATE: 20 BRPM | DIASTOLIC BLOOD PRESSURE: 63 MMHG | BODY MASS INDEX: 35.88 KG/M2 | HEIGHT: 59 IN | TEMPERATURE: 100.2 F | OXYGEN SATURATION: 99 % | WEIGHT: 178 LBS | SYSTOLIC BLOOD PRESSURE: 133 MMHG | HEART RATE: 89 BPM

## 2019-12-12 DIAGNOSIS — B96.89 ACUTE BACTERIAL BRONCHITIS: Primary | ICD-10-CM

## 2019-12-12 DIAGNOSIS — J20.8 ACUTE BACTERIAL BRONCHITIS: Primary | ICD-10-CM

## 2019-12-12 LAB — S PYO AG THROAT QL: NEGATIVE

## 2019-12-12 PROCEDURE — 87880 STREP A ASSAY W/OPTIC: CPT | Performed by: PHYSICIAN ASSISTANT

## 2019-12-12 PROCEDURE — S9088 SERVICES PROVIDED IN URGENT: HCPCS | Performed by: PHYSICIAN ASSISTANT

## 2019-12-12 PROCEDURE — 87070 CULTURE OTHR SPECIMN AEROBIC: CPT | Performed by: PHYSICIAN ASSISTANT

## 2019-12-12 PROCEDURE — 99213 OFFICE O/P EST LOW 20 MIN: CPT | Performed by: PHYSICIAN ASSISTANT

## 2019-12-12 RX ORDER — GUAIFENESIN 600 MG
1200 TABLET, EXTENDED RELEASE 12 HR ORAL EVERY 12 HOURS SCHEDULED
Qty: 8 TABLET | Refills: 0 | Status: SHIPPED | OUTPATIENT
Start: 2019-12-12 | End: 2020-04-23 | Stop reason: ALTCHOICE

## 2019-12-12 RX ORDER — AMOXICILLIN AND CLAVULANATE POTASSIUM 875; 125 MG/1; MG/1
1 TABLET, FILM COATED ORAL EVERY 12 HOURS SCHEDULED
Qty: 14 TABLET | Refills: 0 | Status: SHIPPED | OUTPATIENT
Start: 2019-12-12 | End: 2019-12-19

## 2019-12-12 NOTE — PROGRESS NOTES
[facility]        NAME: Clemencia Sotomayor is a 25 y o  female  : 1995    MRN: [id number]  DATE: 2019  TIME: 6:07 PM    Assessment and Plan   Acute bacterial bronchitis [J20 8, B96 89]  1  Acute bacterial bronchitis  POCT rapid strepA    amoxicillin-clavulanate (AUGMENTIN) 875-125 mg per tablet    guaiFENesin (MUCINEX) 600 mg 12 hr tablet         Patient Instructions       Follow up with PCP in 3-5 days  Proceed to  ER if symptoms worsen  Chief Complaint     Chief Complaint   Patient presents with    Cough     Patient states she has had a cough for three weeks  He cough is slightly better, but now has a sore throat and L ear pain  Patient has taken tambiflu an dtylenol         History of Present Illness       Cough   This is a new problem  Episode onset: Waxing waning for 3 weeks  Roughly 1 week ago she developed fevers  Progression since onset: Was previously dry now has progressively becoming productive  Started with a fever which seemed to resolve, however, fever returned today  The problem occurs every few minutes  The cough is productive of brown sputum  Associated symptoms include a fever, nasal congestion, postnasal drip, rhinorrhea and a sore throat  Pertinent negatives include no chest pain, chills, ear pain, headaches, myalgias, rash, shortness of breath, sweats or wheezing  Nothing aggravates the symptoms  Treatments tried: Patient got Tamiflu from a health Clinic  She took all 5 days  Patient has been using Tylenol  The treatment provided mild relief  Review of Systems   Review of Systems   Constitutional: Positive for fever  Negative for chills, diaphoresis and fatigue  HENT: Positive for postnasal drip, rhinorrhea, sinus pressure, sinus pain, sneezing and sore throat  Negative for congestion, ear pain and voice change  Eyes: Negative  Respiratory: Positive for cough  Negative for chest tightness, shortness of breath and wheezing      Cardiovascular: Negative for chest pain and palpitations  Gastrointestinal: Negative for constipation, diarrhea, nausea and vomiting  Endocrine: Negative  Genitourinary: Negative for dysuria  Musculoskeletal: Negative for back pain, myalgias and neck pain  Skin: Negative for pallor and rash  Allergic/Immunologic: Negative  Neurological: Negative for dizziness, syncope and headaches  Hematological: Negative  Psychiatric/Behavioral: Negative  Current Medications       Current Outpatient Medications:     amoxicillin-clavulanate (AUGMENTIN) 875-125 mg per tablet, Take 1 tablet by mouth every 12 (twelve) hours for 7 days, Disp: 14 tablet, Rfl: 0    ciprofloxacin-dexamethasone (CIPRODEX) otic suspension, Administer 4 drops into the left ear 2 (two) times a day for 10 days, Disp: 4 mL, Rfl: 0    guaiFENesin (MUCINEX) 600 mg 12 hr tablet, Take 2 tablets (1,200 mg total) by mouth every 12 (twelve) hours, Disp: 8 tablet, Rfl: 0    methylPREDNISolone 4 MG tablet therapy pack, Use as directed on package (Patient not taking: Reported on 2019), Disp: 1 each, Rfl: 0    Current Allergies     Allergies as of 2019 - Reviewed 2019   Allergen Reaction Noted    Aspirin  2017    Topamax [topiramate]  2019            The following portions of the patient's history were reviewed and updated as appropriate: allergies, current medications, past family history, past medical history, past social history, past surgical history and problem list      Past Medical History:   Diagnosis Date    Migraine     Placenta previa antepartum in first trimester     Varicella     As a child    Von Willebrand disease ([facility] [address] )        Past Surgical History:   Procedure Laterality Date    ADENOIDECTOMY      CHOLECYSTECTOMY      2017    GALLBLADDER SURGERY      removal- resolved    [place]  DELIVERY ONLY N/A 2018    Procedure:  SECTION ();   Surgeon: Ponce Navarro MD;  Location:  [facility];  Service: Obstetrics    TONSILLECTOMY      in 2011       Family History   Problem Relation Age of Onset    Diabetes Paternal Grandmother     Hypertension Paternal Grandmother     Diabetes Paternal Grandfather     No Known Problems Mother     No Known Problems Father     No Known Problems Sister     No Known Problems Brother          Medications have been verified  Objective   /63   Pulse 89   Temp 100 2 °F (37 9 °C)   Resp 20   Ht 4' 11" (1 499 m)   Wt 80 7 kg (178 lb)   LMP 12/12/2019   SpO2 99%   BMI 35 95 kg/m²        Physical Exam     Physical Exam   Constitutional: She appears well-developed and well-nourished  No distress  HENT:   Head: Normocephalic and atraumatic  Right Ear: Hearing, tympanic membrane, external ear and ear canal normal    Left Ear: Hearing, tympanic membrane, external ear and ear canal normal    Nose: Mucosal edema present  Right sinus exhibits no maxillary sinus tenderness and no frontal sinus tenderness  Left sinus exhibits no maxillary sinus tenderness and no frontal sinus tenderness  Mouth/Throat: Posterior oropharyngeal erythema (Postnasal drip) present  No oropharyngeal exudate or posterior oropharyngeal edema  Eyes: Conjunctivae are normal  Right eye exhibits no discharge  Left eye exhibits no discharge  Neck: Normal range of motion  Neck supple  Cardiovascular: Normal rate, regular rhythm, normal heart sounds and intact distal pulses  Pulmonary/Chest: Effort normal and breath sounds normal  No respiratory distress  She has no wheezes  She has no rales  Lymphadenopathy:     She has no cervical adenopathy  Skin: Skin is warm  Capillary refill takes less than 2 seconds  No rash noted  She is not diaphoretic  No pallor  Nursing note and vitals reviewed

## 2019-12-12 NOTE — PATIENT INSTRUCTIONS
Continue to monitor symptoms  If new or worsening symptoms develop, go immediately to Er  Drink plenty of fluids  Follow up with Family Doctor this week  Acute Bronchitis   WHAT YOU NEED TO KNOW:   Acute bronchitis is swelling and irritation in the air passages of your lungs  This irritation may cause you to cough or have other breathing problems  Acute bronchitis often starts because of another illness, such as a cold or the flu  The illness spreads from your nose and throat to your windpipe and airways  Bronchitis is often called a chest cold  Acute bronchitis lasts about 3 to 6 weeks and is usually not a serious illness  Your cough can last for several weeks  DISCHARGE INSTRUCTIONS:   Return to the emergency department if:   · You cough up blood  · Your lips or fingernails turn blue  · You feel like you are not getting enough air when you breathe  Contact your healthcare provider if:   · You have a fever  · Your breathing problems do not go away or get worse  · Your cough does not get better within 4 weeks  · You have questions or concerns about your condition or care  Self-care:   · Get more rest   Rest helps your body to heal  Slowly start to do more each day  Rest when you feel it is needed  · Avoid irritants in the air  Avoid chemicals, fumes, and dust  Wear a face mask if you must work around dust or fumes  Stay inside on days when air pollution levels are high  If you have allergies, stay inside when pollen counts are high  Do not use aerosol products, such as spray-on deodorant, bug spray, and hair spray  · Do not smoke or be around others who smoke  Nicotine and other chemicals in cigarettes and cigars damages the cilia that move mucus out of your lungs  Ask your healthcare provider for information if you currently smoke and need help to quit  E-cigarettes or smokeless tobacco still contain nicotine  Talk to your healthcare provider before you use these products       · Drink liquids as directed  Liquids help keep your air passages moist and help you cough up mucus  You may need to drink more liquids when you have acute bronchitis  Ask how much liquid to drink each day and which liquids are best for you  · Use a humidifier or vaporizer  Use a cool mist humidifier or a vaporizer to increase air moisture in your home  This may make it easier for you to breathe and help decrease your cough  Decrease risk for acute bronchitis:   · Get the vaccinations you need  Ask your healthcare provider if you should get vaccinated against the flu or pneumonia  · Prevent the spread of germs  You can decrease your risk of acute bronchitis and other illnesses by doing the following:     [facility] your hands often with soap and water  Carry germ-killing hand lotion or gel with you  You can use the lotion or gel to clean your hands when soap and water are not available  ¨ Do not touch your eyes, nose, or mouth unless you have washed your hands first     ¨ Always cover your mouth when you cough to prevent the spread of germs  It is best to cough into a tissue or your shirt sleeve instead of into your hand  Ask those around you cover their mouths when they cough  ¨ Try to avoid people who have a cold or the flu  If you are sick, stay away from others as much as possible  Medicines: Your healthcare provider may  give you any of the following:  · Ibuprofen or acetaminophen  are medicines that help lower your fever  They are available without a doctor's order  Ask your healthcare provider which medicine is right for you  Ask how much to take and how often to take it  Follow directions  These medicines can cause stomach bleeding if not taken correctly  Ibuprofen can cause kidney damage  Do not take ibuprofen if you have kidney disease, an ulcer, or allergies to aspirin  Acetaminophen can cause liver damage  Do not take more than 4,000 milligrams in 24 hours       · Decongestants  help loosen mucus in your lungs and make it easier to cough up  This can help you breathe easier  · Cough suppressants  decrease your urge to cough  If your cough produces mucus, do not take a cough suppressant unless your healthcare provider tells you to  Your healthcare provider may suggest that you take a cough suppressant at night so you can rest     · Inhalers  may be given  Your healthcare provider may give you one or more inhalers to help you breathe easier and cough less  An inhaler gives your medicine to open your airways  Ask your healthcare provider to show you how to use your inhaler correctly  · Take your medicine as directed  Contact your healthcare provider if you think your medicine is not helping or if you have side effects  Tell him of her if you are allergic to any medicine  Keep a list of the medicines, vitamins, and herbs you take  Include the amounts, and when and why you take them  Bring the list or the pill bottles to follow-up visits  Carry your medicine list with you in case of an emergency  Follow up with your healthcare provider as directed:  Write down questions you have so you will remember to ask them during your follow-up visits  © 2017 [employer] Information is for End User's use only and may not be sold, redistributed or otherwise used for commercial purposes  All illustrations and images included in CareNotes® are the copyrighted property of [employer]  or [facility]  The above information is an  only  It is not intended as medical advice for individual conditions or treatments  Talk to your doctor, nurse or pharmacist before following any medical regimen to see if it is safe and effective for you

## 2019-12-12 NOTE — LETTER
December 12, 2019     Patient: Cy Daughters   YOB: 1995   Date of Visit: 12/12/2019       To Whom It May Concern: It is my medical opinion that Cy Sánchez may return to work on 12/14/2019  If you have any questions or concerns, please don't hesitate to call           Sincerely,        Sam Prado PA-C    CC: No Recipients

## 2019-12-15 LAB — BACTERIA THROAT CULT: NORMAL

## 2020-01-06 NOTE — H&P
H&P Exam - Obstetrics   Dakota Kowalski 21 y o  female MRN: [id number]  Unit/Bed#: LD Triage 3 Encounter: [id number]      History of Present Illness     Chief Complaint: Contractions    HPI:  Dakota Kowalski is a 21 y o   female with an AUSTEN of 2018,  at 39w3d weeks gestation who is being admitted for early labor  Contractions: Began vane at 10 AM  Vaginal Bleeding: Some pink discharge yesterday  Loss of Fluid: Denies  Fetal Movement: Reports good fetal movement    She is a patient of Dr Sunny Rodríguez   Patient does desire epidural eventually  Had an appointment with anesthesia and saw Dr Kaykay Irwin on 2018  Pt sx include menorrhagia and a history of a hematoma status post tonsillectomy POD #1 when she was 14 yo  Patient was due to have appointment today with hematologist Dr Eleanor Gomez  Called and discussed with Dr Eugene WALKER who relates Dr Goyal's recommendations of administering Humate (Factor VIII) 50 units/kg once 30 minutes prior to administration of epidural   This is typically followed by 30 units/kg q 12 hours for 4 doses  If any additional questions should arise during patient's labor course please contact Dr Eleanor Gomez  (Cell [phone])  PREGNANCY COMPLICATIONS:  GBS positive status   Von willebrands disease with normal VWF activity on 18   Obesity    OB History    Para Term  AB Living   1 0 0 0 0 0   SAB TAB Ectopic Multiple Live Births   0 0 0 0 0      # Outcome Date GA Lbr Derik/2nd Weight Sex Delivery Anes PTL Lv   1 Current                   Baby complications/comments:   None  Vertex presentation  EFW 7 lb 8 oz by Leopolds  Anterior placenta    Review of Systems   Constitutional: Negative for chills and fever  Eyes: Negative for visual disturbance  Respiratory: Negative for chest tightness, shortness of breath and wheezing  Cardiovascular: Negative for chest pain, palpitations and leg swelling  Gastrointestinal: Positive for abdominal pain   Negative for constipation, diarrhea, nausea and vomiting  Genitourinary: Negative for dysuria, flank pain, frequency, hematuria and urgency  Musculoskeletal: Negative for arthralgias and myalgias  Neurological: Negative for dizziness, weakness, light-headedness and headaches  Historical Information   Past Medical History:   Diagnosis Date    Migraine     Placenta previa antepartum in first trimester     Varicella     As a child    Norm [place]ston disease ([address] )      Past Surgical History:   Procedure Laterality Date    CHOLECYSTECTOMY      2017    GALLBLADDER SURGERY      removal- resolved    TONSILLECTOMY      in      Social History   History   Alcohol Use No     History   Drug Use No     History   Smoking Status    Never Smoker   Smokeless Tobacco    Never Used     Family History:   Family History   Problem Relation Age of Onset    Diabetes Paternal Grandmother     Hypertension Paternal Grandmother     Diabetes Paternal Grandfather     No Known Problems Mother     No Known Problems Father     No Known Problems Sister     No Known Problems Brother        Meds/Allergies    {  Prescriptions Prior to Admission   Medication    Prenatal Multivit-Min-Fe-FA (PRE- PO)        Allergies   Allergen Reactions    Aspirin      Von-willebrand disorder        OBJECTIVE:    Vitals:   /69   Pulse 85   Temp 98 2 °F (36 8 °C)   Resp 18   Ht 4' 11" (1 499 m)   Wt 90 3 kg (199 lb)   LMP 2017   SpO2 100%   BMI 40 19 kg/m²   Body mass index is 40 19 kg/m²  Physical Exam   Constitutional: She is oriented to person, place, and time  She appears well-developed and well-nourished  No distress  HENT:   Head: Normocephalic and atraumatic  Neck: Normal range of motion  Neck supple  Cardiovascular: Normal rate, regular rhythm and normal heart sounds  Exam reveals no gallop and no friction rub  No murmur heard    Pulmonary/Chest: Effort normal and breath sounds normal  No respiratory distress  She has no wheezes  She has no rales  Abdominal: Soft  There is no tenderness  There is no rebound and no guarding  Genitourinary: Vagina normal    Neurological: She is alert and oriented to person, place, and time  Skin: Skin is warm and dry  She is not diaphoretic  Psychiatric: She has a normal mood and affect  Her behavior is normal    Vitals reviewed  SVE: Dilation: 4  Effacement (%): 90  Station: -1  Method: Manual (m ekonimidis)  OB Examiner: Aileen    FHT:  Baseline Rate: 130 bpm  Variability: Moderate 6-25 bpm  Accelerations: 15 x 15 or greater, At variable times  Decelerations: None  TOCO:   Contraction Frequency (minutes): 1 5-2  Contraction Duration (seconds): 60-80  Contraction Quality: Moderate      Prenatal Labs:   Blood type: B positive  Antigen Screen: negative  Rubella: Immune  HIV: Negative  RPR: NR  Hep B: negative  Diabetes Screen: 95  GBS: negative     Assessment/Plan     ASSESSMENT:   at 39w3d weeks gestation in early labor  Von Willebrand's Dx  GBS positive    PLAN:   1) Admit to L&D   2) CBC, RPR, Blood Type   3) Coags: INR and PTT   4) Analgesia and/or epidural at patient request- called and aware of  Hematology recommendations   5) If patient to receive epidural- Humate 50 u/kg ordered and to be  administered 30 minutes prior to epidural administration followed by 30  units/kg q 12 hours for a total of 4 doses   6) Expectant management at this time and anticipate     Discussed with Dr Marietta Healy    This patient will be an INPATIENT  and I certify the anticipated length of stay is >2 Midnights        Tricia Peraza MD  2018  3:37 PM was not able to attain

## 2020-04-21 ENCOUNTER — TELEPHONE (OUTPATIENT)
Dept: NEUROLOGY | Facility: CLINIC | Age: 25
End: 2020-04-21

## 2020-04-23 ENCOUNTER — TELEPHONE (OUTPATIENT)
Dept: NEUROLOGY | Facility: CLINIC | Age: 25
End: 2020-04-23

## 2020-04-23 ENCOUNTER — TELEMEDICINE (OUTPATIENT)
Dept: NEUROLOGY | Facility: CLINIC | Age: 25
End: 2020-04-23
Payer: COMMERCIAL

## 2020-04-23 DIAGNOSIS — G43.719 INTRACTABLE CHRONIC MIGRAINE WITHOUT AURA AND WITHOUT STATUS MIGRAINOSUS: Primary | ICD-10-CM

## 2020-04-23 DIAGNOSIS — H53.9 VISION CHANGES: ICD-10-CM

## 2020-04-23 DIAGNOSIS — R51.9 MORNING HEADACHE: ICD-10-CM

## 2020-04-23 PROCEDURE — 99443 PR PHYS/QHP TELEPHONE EVALUATION 21-30 MIN: CPT | Performed by: PSYCHIATRY & NEUROLOGY

## 2020-04-23 RX ORDER — DEXAMETHASONE 1 MG
TABLET ORAL
Qty: 7 TABLET | Refills: 0 | Status: SHIPPED | OUTPATIENT
Start: 2020-04-23 | End: 2020-06-05 | Stop reason: ALTCHOICE

## 2020-04-23 RX ORDER — RIZATRIPTAN BENZOATE 10 MG/1
TABLET, ORALLY DISINTEGRATING ORAL
Qty: 12 TABLET | Refills: 3 | Status: SHIPPED | OUTPATIENT
Start: 2020-04-23 | End: 2021-03-08

## 2020-04-28 ENCOUNTER — TELEPHONE (OUTPATIENT)
Dept: NEUROLOGY | Facility: CLINIC | Age: 25
End: 2020-04-28

## 2020-06-04 ENCOUNTER — TELEPHONE (OUTPATIENT)
Dept: FAMILY MEDICINE CLINIC | Facility: CLINIC | Age: 25
End: 2020-06-04

## 2020-06-04 DIAGNOSIS — H92.03 ACUTE EAR PAIN, BILATERAL: Primary | ICD-10-CM

## 2020-06-05 DIAGNOSIS — G43.719 INTRACTABLE CHRONIC MIGRAINE WITHOUT AURA AND WITHOUT STATUS MIGRAINOSUS: Primary | ICD-10-CM

## 2020-06-08 RX ORDER — GALCANEZUMAB 120 MG/ML
120 INJECTION, SOLUTION SUBCUTANEOUS
Qty: 1 PEN | Refills: 2 | Status: SHIPPED | OUTPATIENT
Start: 2020-06-08 | End: 2021-03-08

## 2020-06-22 RX ORDER — PROPRANOLOL HYDROCHLORIDE 40 MG/1
40 TABLET ORAL 2 TIMES DAILY
Qty: 60 TABLET | Refills: 3 | Status: SHIPPED | OUTPATIENT
Start: 2020-06-22 | End: 2020-06-26 | Stop reason: ALTCHOICE

## 2020-06-26 ENCOUNTER — OFFICE VISIT (OUTPATIENT)
Dept: FAMILY MEDICINE CLINIC | Facility: CLINIC | Age: 25
End: 2020-06-26
Payer: COMMERCIAL

## 2020-06-26 VITALS
SYSTOLIC BLOOD PRESSURE: 116 MMHG | WEIGHT: 181.6 LBS | OXYGEN SATURATION: 98 % | BODY MASS INDEX: 35.65 KG/M2 | RESPIRATION RATE: 18 BRPM | HEIGHT: 60 IN | TEMPERATURE: 99 F | DIASTOLIC BLOOD PRESSURE: 68 MMHG | HEART RATE: 71 BPM

## 2020-06-26 DIAGNOSIS — Z00.00 ANNUAL PHYSICAL EXAM: Primary | ICD-10-CM

## 2020-06-26 DIAGNOSIS — Z13.1 SCREENING FOR DIABETES MELLITUS: ICD-10-CM

## 2020-06-26 DIAGNOSIS — R53.83 OTHER FATIGUE: ICD-10-CM

## 2020-06-26 DIAGNOSIS — R63.5 WEIGHT GAIN: ICD-10-CM

## 2020-06-26 DIAGNOSIS — Z13.6 SCREENING FOR CARDIOVASCULAR CONDITION: ICD-10-CM

## 2020-06-26 PROCEDURE — 99395 PREV VISIT EST AGE 18-39: CPT | Performed by: FAMILY MEDICINE

## 2020-07-03 ENCOUNTER — LAB (OUTPATIENT)
Dept: LAB | Facility: MEDICAL CENTER | Age: 25
End: 2020-07-03
Payer: COMMERCIAL

## 2020-07-03 DIAGNOSIS — R63.5 WEIGHT GAIN: ICD-10-CM

## 2020-07-03 DIAGNOSIS — Z13.6 SCREENING FOR CARDIOVASCULAR CONDITION: ICD-10-CM

## 2020-07-03 DIAGNOSIS — R53.83 OTHER FATIGUE: ICD-10-CM

## 2020-07-03 DIAGNOSIS — Z13.1 SCREENING FOR DIABETES MELLITUS: ICD-10-CM

## 2020-07-03 LAB
ALBUMIN SERPL BCP-MCNC: 3.9 G/DL (ref 3.5–5)
ALP SERPL-CCNC: 85 U/L (ref 46–116)
ALT SERPL W P-5'-P-CCNC: 24 U/L (ref 12–78)
ANION GAP SERPL CALCULATED.3IONS-SCNC: 3 MMOL/L (ref 4–13)
AST SERPL W P-5'-P-CCNC: 11 U/L (ref 5–45)
BASOPHILS # BLD AUTO: 0.02 THOUSANDS/ΜL (ref 0–0.1)
BASOPHILS NFR BLD AUTO: 0 % (ref 0–1)
BILIRUB SERPL-MCNC: 0.47 MG/DL (ref 0.2–1)
BUN SERPL-MCNC: 11 MG/DL (ref 5–25)
CALCIUM SERPL-MCNC: 8.8 MG/DL (ref 8.3–10.1)
CHLORIDE SERPL-SCNC: 106 MMOL/L (ref 100–108)
CHOLEST SERPL-MCNC: 142 MG/DL (ref 50–200)
CO2 SERPL-SCNC: 29 MMOL/L (ref 21–32)
CREAT SERPL-MCNC: 0.6 MG/DL (ref 0.6–1.3)
EOSINOPHIL # BLD AUTO: 0.09 THOUSAND/ΜL (ref 0–0.61)
EOSINOPHIL NFR BLD AUTO: 1 % (ref 0–6)
ERYTHROCYTE [DISTWIDTH] IN BLOOD BY AUTOMATED COUNT: 13.5 % (ref 11.6–15.1)
EST. AVERAGE GLUCOSE BLD GHB EST-MCNC: 111 MG/DL
GFR SERPL CREATININE-BSD FRML MDRD: 127 ML/MIN/1.73SQ M
GLUCOSE P FAST SERPL-MCNC: 100 MG/DL (ref 65–99)
HBA1C MFR BLD: 5.5 %
HCT VFR BLD AUTO: 42.6 % (ref 34.8–46.1)
HDLC SERPL-MCNC: 38 MG/DL
HGB BLD-MCNC: 13.5 G/DL (ref 11.5–15.4)
IMM GRANULOCYTES # BLD AUTO: 0.01 THOUSAND/UL (ref 0–0.2)
IMM GRANULOCYTES NFR BLD AUTO: 0 % (ref 0–2)
LDLC SERPL CALC-MCNC: 94 MG/DL (ref 0–100)
LYMPHOCYTES # BLD AUTO: 2.34 THOUSANDS/ΜL (ref 0.6–4.47)
LYMPHOCYTES NFR BLD AUTO: 37 % (ref 14–44)
MCH RBC QN AUTO: 27.5 PG (ref 26.8–34.3)
MCHC RBC AUTO-ENTMCNC: 31.7 G/DL (ref 31.4–37.4)
MCV RBC AUTO: 87 FL (ref 82–98)
MONOCYTES # BLD AUTO: 0.49 THOUSAND/ΜL (ref 0.17–1.22)
MONOCYTES NFR BLD AUTO: 8 % (ref 4–12)
NEUTROPHILS # BLD AUTO: 3.36 THOUSANDS/ΜL (ref 1.85–7.62)
NEUTS SEG NFR BLD AUTO: 54 % (ref 43–75)
NRBC BLD AUTO-RTO: 0 /100 WBCS
PLATELET # BLD AUTO: 227 THOUSANDS/UL (ref 149–390)
PMV BLD AUTO: 10.4 FL (ref 8.9–12.7)
POTASSIUM SERPL-SCNC: 4.5 MMOL/L (ref 3.5–5.3)
PROT SERPL-MCNC: 7.5 G/DL (ref 6.4–8.2)
RBC # BLD AUTO: 4.91 MILLION/UL (ref 3.81–5.12)
SODIUM SERPL-SCNC: 138 MMOL/L (ref 136–145)
TRIGL SERPL-MCNC: 48 MG/DL
TSH SERPL DL<=0.05 MIU/L-ACNC: 0.67 UIU/ML (ref 0.36–3.74)
WBC # BLD AUTO: 6.31 THOUSAND/UL (ref 4.31–10.16)

## 2020-07-03 PROCEDURE — 80053 COMPREHEN METABOLIC PANEL: CPT

## 2020-07-03 PROCEDURE — 80061 LIPID PANEL: CPT

## 2020-07-03 PROCEDURE — 84443 ASSAY THYROID STIM HORMONE: CPT

## 2020-07-03 PROCEDURE — 85025 COMPLETE CBC W/AUTO DIFF WBC: CPT

## 2020-07-03 PROCEDURE — 83036 HEMOGLOBIN GLYCOSYLATED A1C: CPT

## 2020-07-03 PROCEDURE — 36415 COLL VENOUS BLD VENIPUNCTURE: CPT

## 2020-07-04 NOTE — RESULT ENCOUNTER NOTE
Please let the patient know that their bloodwork was normal - Thyroid was normal, and A1c was at 5 5%  She is to keep working on a lower carb diet, and regular exercise  Thanks     Dr Mara Westfall

## 2020-07-13 ENCOUNTER — HOSPITAL ENCOUNTER (EMERGENCY)
Facility: HOSPITAL | Age: 25
Discharge: HOME | End: 2020-07-13
Attending: EMERGENCY MEDICINE | Admitting: EMERGENCY MEDICINE
Payer: COMMERCIAL

## 2020-07-13 VITALS
DIASTOLIC BLOOD PRESSURE: 55 MMHG | BODY MASS INDEX: 35.9 KG/M2 | HEART RATE: 89 BPM | OXYGEN SATURATION: 100 % | TEMPERATURE: 98.2 F | WEIGHT: 181.66 LBS | SYSTOLIC BLOOD PRESSURE: 119 MMHG | RESPIRATION RATE: 18 BRPM

## 2020-07-13 DIAGNOSIS — L02.91 ABSCESS: Primary | ICD-10-CM

## 2020-07-13 LAB
ANION GAP SERPL CALCULATED.3IONS-SCNC: 7 MMOL/L (ref 4–13)
BASOPHILS # BLD AUTO: 0.04 THOUSANDS/ΜL (ref 0–0.1)
BASOPHILS NFR BLD AUTO: 0 % (ref 0–1)
BUN SERPL-MCNC: 9 MG/DL (ref 5–25)
CALCIUM SERPL-MCNC: 8.3 MG/DL (ref 8.3–10.1)
CHLORIDE SERPL-SCNC: 103 MMOL/L (ref 100–108)
CO2 SERPL-SCNC: 27 MMOL/L (ref 21–32)
CREAT SERPL-MCNC: 0.68 MG/DL (ref 0.6–1.3)
EOSINOPHIL # BLD AUTO: 0.03 THOUSAND/ΜL (ref 0–0.61)
EOSINOPHIL NFR BLD AUTO: 0 % (ref 0–6)
ERYTHROCYTE [DISTWIDTH] IN BLOOD BY AUTOMATED COUNT: 13.1 % (ref 11.6–15.1)
GFR SERPL CREATININE-BSD FRML MDRD: 122 ML/MIN/1.73SQ M
GLUCOSE SERPL-MCNC: 103 MG/DL (ref 65–140)
HCT VFR BLD AUTO: 38.8 % (ref 34.8–46.1)
HGB BLD-MCNC: 12.5 G/DL (ref 11.5–15.4)
IMM GRANULOCYTES # BLD AUTO: 0.1 THOUSAND/UL (ref 0–0.2)
IMM GRANULOCYTES NFR BLD AUTO: 1 % (ref 0–2)
LYMPHOCYTES # BLD AUTO: 1.71 THOUSANDS/ΜL (ref 0.6–4.47)
LYMPHOCYTES NFR BLD AUTO: 12 % (ref 14–44)
MCH RBC QN AUTO: 28.2 PG (ref 26.8–34.3)
MCHC RBC AUTO-ENTMCNC: 32.2 G/DL (ref 31.4–37.4)
MCV RBC AUTO: 87 FL (ref 82–98)
MONOCYTES # BLD AUTO: 1.11 THOUSAND/ΜL (ref 0.17–1.22)
MONOCYTES NFR BLD AUTO: 8 % (ref 4–12)
NEUTROPHILS # BLD AUTO: 10.85 THOUSANDS/ΜL (ref 1.85–7.62)
NEUTS SEG NFR BLD AUTO: 79 % (ref 43–75)
NRBC BLD AUTO-RTO: 0 /100 WBCS
PLATELET # BLD AUTO: 212 THOUSANDS/UL (ref 149–390)
PMV BLD AUTO: 9.1 FL (ref 8.9–12.7)
POTASSIUM SERPL-SCNC: 3.9 MMOL/L (ref 3.5–5.3)
RBC # BLD AUTO: 4.44 MILLION/UL (ref 3.81–5.12)
SODIUM SERPL-SCNC: 137 MMOL/L (ref 136–145)
WBC # BLD AUTO: 13.84 THOUSAND/UL (ref 4.31–10.16)

## 2020-07-13 PROCEDURE — 76882 US LMTD JT/FCL EVL NVASC XTR: CPT | Performed by: PHYSICIAN ASSISTANT

## 2020-07-13 PROCEDURE — 85025 COMPLETE CBC W/AUTO DIFF WBC: CPT | Performed by: PHYSICIAN ASSISTANT

## 2020-07-13 PROCEDURE — 99284 EMERGENCY DEPT VISIT MOD MDM: CPT | Performed by: PHYSICIAN ASSISTANT

## 2020-07-13 PROCEDURE — 99283 EMERGENCY DEPT VISIT LOW MDM: CPT

## 2020-07-13 PROCEDURE — 36415 COLL VENOUS BLD VENIPUNCTURE: CPT | Performed by: PHYSICIAN ASSISTANT

## 2020-07-13 PROCEDURE — 96361 HYDRATE IV INFUSION ADD-ON: CPT

## 2020-07-13 PROCEDURE — 80048 BASIC METABOLIC PNL TOTAL CA: CPT | Performed by: PHYSICIAN ASSISTANT

## 2020-07-13 PROCEDURE — 96374 THER/PROPH/DIAG INJ IV PUSH: CPT

## 2020-07-13 RX ORDER — OXYCODONE HYDROCHLORIDE AND ACETAMINOPHEN 5; 325 MG/1; MG/1
1 TABLET ORAL EVERY 8 HOURS PRN
Qty: 12 TABLET | Refills: 0 | Status: SHIPPED | OUTPATIENT
Start: 2020-07-13 | End: 2020-10-05 | Stop reason: ALTCHOICE

## 2020-07-13 RX ORDER — ONDANSETRON 4 MG/1
4 TABLET, ORALLY DISINTEGRATING ORAL EVERY 6 HOURS PRN
Qty: 20 TABLET | Refills: 0 | Status: SHIPPED | OUTPATIENT
Start: 2020-07-13 | End: 2021-03-08

## 2020-07-13 RX ORDER — MORPHINE SULFATE 4 MG/ML
4 INJECTION, SOLUTION INTRAMUSCULAR; INTRAVENOUS ONCE
Status: COMPLETED | OUTPATIENT
Start: 2020-07-13 | End: 2020-07-13

## 2020-07-13 RX ADMIN — MORPHINE SULFATE 4 MG: 4 INJECTION INTRAVENOUS at 19:41

## 2020-07-13 RX ADMIN — SODIUM CHLORIDE 1000 ML: 0.9 INJECTION, SOLUTION INTRAVENOUS at 19:41

## 2020-07-13 NOTE — ED PROVIDER NOTES
History  Chief Complaint   Patient presents with    Cyst     pt reports she was here yesterday for a cyst and it couldnt have been drained so she went home  pt reports it is getting bigger and more painful despite the antibiotics  Patient is a 23 y/o female with hx of Alvena Peyer disease, presents to the ED for evaluation of abscess to right lower back  Patient states she had gradually worsening area of redness and swelling to right lower back x1 week  Pt was seen at ED hospital in [facility] yesterday where they stated there was no fluid collection to drain and was placed on keflex and bactrim  Pt states she started the abx yesterday, but today began with subjective fevers and vomiting  Pt feels improved, but still has increased pain  Pt states she has had similar abscesses on her back in the past, has seen dermatology who she states "didn't know why she kept getting them"  Pt states they usually take time to resolve  Pt has been using warm compresses and taking tylenol without improvement  Pt denies abdominal pain, bladder/bowel incontinence, urinary retention, perianal numbness, drainage from wound  Prior to Admission Medications   Prescriptions Last Dose Informant Patient Reported? Taking? EMGALITY 120 MG/ML SOAJ   No No   Sig: Inject 120 mL under the skin every 30 (thirty) days   Patient not taking: Reported on 6/26/2020   Galcanezumab-gnlm 120 MG/ML SOAJ   No No   Sig: Inject 120 mg under the skin every 30 (thirty) days Inject 1 ml SC in each thigh and or stomach for a total of two injections the first time   Then 1 ml only q30 days   Patient not taking: Reported on 6/26/2020   ciprofloxacin-dexamethasone (CIPRODEX) otic suspension   No No   Sig: Administer 4 drops to the right ear 2 (two) times a day for 10 days   Patient taking differently: Administer 4 drops to the right ear as needed    mometasone (ELOCON) 0 1 % lotion   No No   Sig: One drop affected ear canal daily at bedtime when necessary itching   rizatriptan (MAXALT-MLT) 10 MG disintegrating tablet   No No   Sig: Take 1 tab at migraine onset can repeat once in 2 hours  Max 2 tabs in 24 hours  Max 2-3 days a week  Facility-Administered Medications: None       Past Medical History:   Diagnosis Date    Migraine     Placenta previa antepartum in first trimester     Varicella     As a child    Von Willebrand disease ([address] )        Past Surgical History:   Procedure Laterality Date    ADENOIDECTOMY      CHOLECYSTECTOMY      2017    GALLBLADDER SURGERY      removal- resolved    MT  DELIVERY ONLY N/A 2018    Procedure:  SECTION (); Surgeon: Xavi Archuleta MD;  [facility];  Service: Obstetrics    TONSILLECTOMY      in        Family History   Problem Relation Age of Onset    Diabetes Paternal Grandmother     Hypertension Paternal Grandmother     Diabetes Paternal Grandfather     No Known Problems Mother     No Known Problems Father     No Known Problems Sister     No Known Problems Brother      I have reviewed and agree with the history as documented  E-Cigarette/Vaping     E-Cigarette/Vaping Substances     Social History     Tobacco Use    Smoking status: Never Smoker    Smokeless tobacco: Never Used   Substance Use Topics    Alcohol use: No    Drug use: No       Review of Systems   Skin:        abscess   All other systems reviewed and are negative  Physical Exam  Physical Exam   Constitutional: She is oriented to person, place, and time  She appears well-developed and well-nourished  Non-toxic appearance  She does not have a sickly appearance  She does not appear ill  No distress  HENT:   Head: Normocephalic and atraumatic  Nose: Nose normal    Mouth/Throat: Mucous membranes are normal    Eyes: Conjunctivae are normal    Neck: Normal range of motion  Cardiovascular: Normal rate and regular rhythm     Pulmonary/Chest: Effort normal and breath sounds normal  She has no decreased breath sounds  She has no wheezes  She has no rhonchi  She has no rales  Musculoskeletal: Normal range of motion  Back:    Neurovascularly intact distally  5/5 strength bilateral lower extremities  Distal pulses intact  Cap refill <2 seconds  Sensation intact  Neurological: She is alert and oriented to person, place, and time  Skin: Skin is warm and dry  Capillary refill takes less than 2 seconds  Psychiatric: She has a normal mood and affect   Her behavior is normal        Vital Signs  ED Triage Vitals   Temperature Pulse Respirations Blood Pressure SpO2   07/13/20 1827 07/13/20 1827 07/13/20 1827 07/13/20 1828 07/13/20 1827   98 2 °F (36 8 °C) (!) 106 18 119/80 96 %      Temp Source Heart Rate Source Patient Position - Orthostatic VS BP Location FiO2 (%)   07/13/20 1827 07/13/20 2031 07/13/20 2031 07/13/20 2031 --   Temporal Monitor Sitting Left arm       Pain Score       07/13/20 1827       Worst Possible Pain           Vitals:    07/13/20 1827 07/13/20 1828 07/13/20 2031   BP:  119/80 119/55   Pulse: (!) 106  89   Patient Position - Orthostatic VS:   Sitting         Visual Acuity      ED Medications  Medications   sodium chloride 0 9 % bolus 1,000 mL (0 mL Intravenous Stopped 7/13/20 2057)   morphine (PF) 4 mg/mL injection 4 mg (4 mg Intravenous Given 7/13/20 1941)       Diagnostic Studies  Results Reviewed     Procedure Component Value Units Date/Time    Basic metabolic panel [[id number]] Collected:  07/13/20 1935    Lab Status:  Final result Specimen:  Blood from Arm, Right Updated:  07/13/20 1952     Sodium 137 mmol/L      Potassium 3 9 mmol/L      Chloride 103 mmol/L      CO2 27 mmol/L      ANION GAP 7 mmol/L      BUN 9 mg/dL      Creatinine 0 68 mg/dL      Glucose 103 mg/dL      Calcium 8 3 mg/dL      eGFR 122 ml/min/1 73sq m     Narrative:       [place] guidelines for Chronic Kidney Disease (CKD):     Stage 1 with normal or high GFR (GFR > 90 mL/min/1 73 square meters)    Stage 2 Mild CKD (GFR = 60-89 mL/min/1 73 square meters)    Stage 3A Moderate CKD (GFR = 45-59 mL/min/1 73 square meters)    Stage 3B Moderate CKD (GFR = 30-44 mL/min/1 73 square meters)    Stage 4 Severe CKD (GFR = 15-29 mL/min/1 73 square meters)    Stage 5 End Stage CKD (GFR <15 mL/min/1 73 square meters)  Note: GFR calculation is accurate only with a steady state creatinine    CBC and differential [[id number]]  (Abnormal) Collected:  07/13/20 1935    Lab Status:  Final result Specimen:  Blood from Arm, Right Updated:  07/13/20 1941     WBC 13 84 Thousand/uL      RBC 4 44 Million/uL      Hemoglobin 12 5 g/dL      Hematocrit 38 8 %      MCV 87 fL      MCH 28 2 pg      MCHC 32 2 g/dL      RDW 13 1 %      MPV 9 1 fL      Platelets 467 Thousands/uL      nRBC 0 /100 WBCs      Neutrophils Relative 79 %      Immat GRANS % 1 %      Lymphocytes Relative 12 %      Monocytes Relative 8 %      Eosinophils Relative 0 %      Basophils Relative 0 %      Neutrophils Absolute 10 85 Thousands/µL      Immature Grans Absolute 0 10 Thousand/uL      Lymphocytes Absolute 1 71 Thousands/µL      Monocytes Absolute 1 11 Thousand/µL      Eosinophils Absolute 0 03 Thousand/µL      Basophils Absolute 0 04 Thousands/µL                  No orders to display              Procedures  Procedures         ED Course  ED Course as of Jul 13 2058 Mon Jul 13, 2020 1954 Bedside US shows no area of fluid collection                                                 MDM  Number of Diagnoses or Management Options  Abscess: new and requires workup  Diagnosis management comments: Patient is a 23 y/o female with hx of Altaf Matar disease, presents to the ED for evaluation of abscess to right lower back  Patient states she had gradually worsening area of redness and swelling to right lower back x1 week  Pt was seen at ED hospital in [facility] yesterday where they stated there was no fluid collection to drain and was placed on keflex and bactrim  Pt states she started the abx yesterday, but today began with subjective fevers and vomiting  Pt feels improved, but still has increased pain  Pt states she has had similar abscesses on her back in the past, has seen dermatology who she states "didn't know why she kept getting them"  Pt states they usually take time to resolve  Pt has been using warm compresses and taking tylenol without improvement  3cm x2cm area of induration and erythema, no fluctuance  Bedside US shows no area of fluid collection seen by me  Outlined with surgical pen erythema and induration  Pt well appearing, non-toxic, afebrile and tolerating PO in ED  Since <24 hours of outpatient abx, advised patient to give oral abx more time and if no improvement/worsening by Wednesday to return to the ED  Information for Surgery provided to patient as she will likely require surgical intervention for recurrent abscess  Rx for percocet and zofran sent to patient's pharmacy for pain relief, I reviewed this patient through the [facility] portal and did not find any evidence of narcotic abuse or doctor shopping  Return precautions discussed with patient  Patient verbalizes understanding and agrees with plan  The management plan was discussed in detail with the patient at bedside and all questions were answered  Prior to discharge, I provided both verbal and written instructions  I discussed with the patient the signs and symptoms for which to return to the emergency department  All questions were answered and patient was comfortable with the plan of care and discharged to home  The patient agrees to return to the Emergency Department for concerns and/or progression of illness          Disposition  Final diagnoses:   Abscess     Time reflects when diagnosis was documented in both MDM as applicable and the Disposition within this note     Time User Action Codes Description Comment    7/13/2020  8:28 PM Sheree Chavez Add [L02 [id number]] Abscess       ED Disposition     ED Disposition Condition Date/Time Comment    Discharge Stable Mon Jul 13, 2020  8:33 PM [facility] discharge to home/self care  Follow-up Information     Follow up With Specialties Details Why Contact Info Additional Information    [address] [facility] Schedule an appointment as soon as possible for a visit   [address] [address] [id number] [address], [facility] ([place], [place]) [address], [address], [place], [place], [address]          Patient's Medications   Discharge Prescriptions    ONDANSETRON (ZOFRAN-ODT) 4 MG DISINTEGRATING TABLET    Take 1 tablet (4 mg total) by mouth every 6 (six) hours as needed for nausea or vomiting       Start Date: 7/13/2020 End Date: --       Order Dose: 4 mg       Quantity: 20 tablet    Refills: 0    OXYCODONE-ACETAMINOPHEN (PERCOCET) 5-325 MG PER TABLET    Take 1 tablet by mouth every 8 (eight) hours as needed for severe painMax Daily Amount: 3 tablets       Start Date: 7/13/2020 End Date: --       Order Dose: 1 tablet       Quantity: 12 tablet    Refills: 0     No discharge procedures on file      PDMP Review     None          ED Provider  Electronically Signed by           Dennis Kendall PA-C  07/13/20 9001

## 2020-07-22 PROBLEM — E66.9 CLASS II OBESITY: Status: ACTIVE | Noted: 2020-07-22

## 2020-07-22 PROBLEM — E66.812 CLASS II OBESITY: Status: ACTIVE | Noted: 2020-07-22

## 2020-07-23 ENCOUNTER — TELEPHONE (OUTPATIENT)
Dept: NEUROLOGY | Facility: CLINIC | Age: 25
End: 2020-07-23

## 2020-07-23 NOTE — TELEPHONE ENCOUNTER
LMOM to confirm 7/24/2020 8:45AM with [address] at [facility] and review covid screening questions and to complete registration, please transfer to [address]  ADVISED OF NO SHOW FEE  Please make aware of mask, visitor and temp policy

## 2020-08-27 ENCOUNTER — TELEPHONE (OUTPATIENT)
Dept: BARIATRICS | Facility: CLINIC | Age: 25
End: 2020-08-27

## 2020-10-19 ENCOUNTER — ANNUAL EXAM (OUTPATIENT)
Dept: OBGYN CLINIC | Facility: CLINIC | Age: 25
End: 2020-10-19
Payer: COMMERCIAL

## 2020-10-19 VITALS
WEIGHT: 182 LBS | DIASTOLIC BLOOD PRESSURE: 70 MMHG | SYSTOLIC BLOOD PRESSURE: 100 MMHG | TEMPERATURE: 98 F | HEIGHT: 59 IN | BODY MASS INDEX: 36.69 KG/M2

## 2020-10-19 DIAGNOSIS — Z01.419 ENCNTR FOR GYN EXAM (GENERAL) (ROUTINE) W/O ABN FINDINGS: Primary | ICD-10-CM

## 2020-10-19 DIAGNOSIS — Z01.419 PAP SMEAR, AS PART OF ROUTINE GYNECOLOGICAL EXAMINATION: ICD-10-CM

## 2020-10-19 PROCEDURE — 99395 PREV VISIT EST AGE 18-39: CPT | Performed by: OBSTETRICS & GYNECOLOGY

## 2020-10-19 PROCEDURE — G0143 SCR C/V CYTO,THINLAYER,RESCR: HCPCS | Performed by: OBSTETRICS & GYNECOLOGY

## 2020-10-27 LAB
LAB AP GYN PRIMARY INTERPRETATION: NORMAL
Lab: NORMAL

## 2020-12-12 ENCOUNTER — AMB VIDEO VISIT (OUTPATIENT)
Dept: OTHER | Facility: HOSPITAL | Age: 25
End: 2020-12-12
Payer: COMMERCIAL

## 2020-12-12 PROCEDURE — 99201 PR OFFICE OUTPATIENT NEW 10 MINUTES: CPT | Performed by: SPECIALIST

## 2021-03-08 ENCOUNTER — OFFICE VISIT (OUTPATIENT)
Dept: BARIATRICS | Facility: CLINIC | Age: 26
End: 2021-03-08
Payer: COMMERCIAL

## 2021-03-08 VITALS
HEART RATE: 68 BPM | DIASTOLIC BLOOD PRESSURE: 60 MMHG | WEIGHT: 181.7 LBS | HEIGHT: 60 IN | BODY MASS INDEX: 35.67 KG/M2 | TEMPERATURE: 97.6 F | SYSTOLIC BLOOD PRESSURE: 100 MMHG

## 2021-03-08 DIAGNOSIS — E66.9 CLASS II OBESITY: Primary | ICD-10-CM

## 2021-03-08 DIAGNOSIS — R63.5 WEIGHT GAIN: ICD-10-CM

## 2021-03-08 PROCEDURE — 99204 OFFICE O/P NEW MOD 45 MIN: CPT | Performed by: FAMILY MEDICINE

## 2021-03-08 NOTE — PATIENT INSTRUCTIONS
VISIT SAXENDA  COM  INJECT SAXENDA DAILY SUBCUTANEOUSLY  -WEEK 1: 0 6mg daily  -if tolerated WEEK 2: 1 2mg daily  -if tolerated WEEK 3: 1 8mg daily  -if tolerated WEEK 4: 2 4mg daily  -if tolerated WEEK 5: 3mg daily  -IF NAUSEA/VOMITING DEVELOP STOP MEDICATION FOR A FEW DAYS AND DECREASE TO PREVIOUSLY TOLERATED DOSE  STAY HYDRATED  -IF YOU DEVELOP SEVERE ABDOMINAL PAIN WHICH MAY RADIATE TO THE BACK, SOMETIMES ASSOCIATED WITH FEVER, AND VOMITING, STOP MEDICATION AND SEEK URGENT CARE AS THIS MAY BE A SIGN OF PANCREATITIS  Saxenda the potential side effects include increased heart rate, headache, low blood sugar, GI/abdominal upset, heartburn, fatigue and dizziness, constipation

## 2021-03-08 NOTE — PROGRESS NOTES
Assessment/Plan:      Diagnoses and all orders for this visit:    Class II obesity  -     liraglutide (SAXENDA) injection; Inject 0 6mg subcutaneously once per day for first week  Increase in weekly intervals by 0 6mg until a dose of 3mg is reached  -     Insulin Pen Needle (NovoFine) 32G X 6 MM MISC; Use daily    Weight gain  Comments:  Diet and exercise as above  Discussed numerous medications used in weight loss - disc potential R/SE  Referral placed to Medical Weight Loss Management  Orders:  -     Ambulatory referral to Weight Management  -     liraglutide (SAXENDA) injection; Inject 0 6mg subcutaneously once per day for first week  Increase in weekly intervals by 0 6mg until a dose of 3mg is reached  -     Insulin Pen Needle (NovoFine) 32G X 6 MM MISC; Use daily      -Discussed options of HealthyCORE-Intensive Lifestyle Intervention Program, Very Low Calorie Diet-VLCD and Conservative Program and the role of weight loss medications   -Initial weight loss goal of 5-10% weight loss for improved health  -Screening labs-7/3/20  -Patient is interested in pursuing HealthyCORE-Intensive Lifestyle Intervention Program  VS RD bundle  - Discussed options for AOM, she is interested in AOM for helping with long term weight loss in conjunction to lifestyle changes  - Patient denies personal and family history of MCT, MEN2 tumors and medullary CA  Patient denies personal history of pancreatitis  - discussed Saxenda proper use, titration instructions and side effects  Goals:  Food log (ie ) www myfitnesspal com,sparkpeople  com,loseit com,calorieking  com,etc  baritastic  No sugary beverages  At least 64oz of water daily  Increase physical activity by 10 minutes daily  Gradually increase physical activity to a goal of 5 days per week for 30 minutes of MODERATE intensity PLUS 2 days per week of FULL BODY resistance training  Meal plan as per RD, will schedule apt in 1 week       45 minute visit, >50% face-to-face time spent counseling patient on surgical and nonsurgical interventions for the treatment of excess weight  Discussed the advantages and long-term outcomes with regards to bariatric surgery  Discussed in detail nonsurgical options including intensive lifestyle intervention program, very low-calorie diet program and conservative program   Discussed the role of weight loss medications  Counseled patient on diet behavior and exercise modification for weight loss  Follow up in approximately in 1 week with RD and  1 month with Non-Surgical Physician/Advanced Practitioner  Subjective:   Chief Complaint   Patient presents with    Consult     mwm consult       Patient ID: Tex Billy  is a 22 y o  female with excess weight/obesity here to pursue weight management  Past Medical History:   Diagnosis Date    Migraine     Placenta previa antepartum in first trimester     Varicella     As a child    Von Willebrand disease ([facility] [address] )        HPI:  Obesity/Excess Weight:  Severity: class 1  Onset:  Since her pregnancy in 2018    Modifiers: Diet and Exercise  Contributing factors: Poor Food Choices, Insufficient Physical Activity, Stress/Emotional Eating and Pregnancy  Associated symptoms: increased joint pain, decreased self esteem and increased shortness of breath    Goals: 140lbs  Hydration:  Alcohol: no  Smoking:no  Exercise: on and off  Sleep:  7hrs  STOP ban/8    Social:  Lives with boyfriend and son  Works full time for [employer]    The following portions of the patient's history were reviewed and updated as appropriate: allergies, current medications, past family history, past medical history, past social history, past surgical history and problem list     Review of Systems   Constitutional: Negative for activity change and appetite change  Respiratory: Negative  Cardiovascular: Negative  Gastrointestinal: Negative  Genitourinary: Negative  Musculoskeletal: Negative for arthralgias     Skin: Negative for rash  Psychiatric/Behavioral: Negative  Objective:    /60 (BP Location: Left arm, Patient Position: Sitting, Cuff Size: Large)   Pulse 68   Temp 97 6 °F (36 4 °C)   Ht 4' 11 5" (1 511 m)   Wt 82 4 kg (181 lb 11 2 oz)   BMI 36 08 kg/m²     Physical Exam     Constitutional   General appearance: Abnormal   well developed and obese  Eyes No conjunctival pallor     Ears, Nose, Mouth, and Throat Oral mucosa moist    Musculoskeletal   Gait and station: Normal     Psychiatric   Orientation to person, place and time: Normal     Affect: appropriate

## 2021-03-11 ENCOUNTER — TELEPHONE (OUTPATIENT)
Dept: BARIATRICS | Facility: CLINIC | Age: 26
End: 2021-03-11

## 2021-03-11 DIAGNOSIS — R63.5 WEIGHT GAIN: ICD-10-CM

## 2021-03-11 DIAGNOSIS — E66.9 CLASS II OBESITY: ICD-10-CM

## 2021-03-12 ENCOUNTER — TELEPHONE (OUTPATIENT)
Dept: BARIATRICS | Facility: CLINIC | Age: 26
End: 2021-03-12

## 2021-03-12 ENCOUNTER — HOSPITAL ENCOUNTER (OUTPATIENT)
Dept: NON INVASIVE DIAGNOSTICS | Facility: HOSPITAL | Age: 26
Discharge: HOME | End: 2021-03-12
Attending: FAMILY MEDICINE
Payer: COMMERCIAL

## 2021-03-12 DIAGNOSIS — E66.9 CLASS II OBESITY: ICD-10-CM

## 2021-03-12 DIAGNOSIS — D68.0 VON WILLEBRAND DISEASE (HCC): ICD-10-CM

## 2021-03-12 DIAGNOSIS — R63.5 WEIGHT GAIN: ICD-10-CM

## 2021-03-12 DIAGNOSIS — E66.9 CLASS II OBESITY: Primary | ICD-10-CM

## 2021-03-12 LAB
ATRIAL RATE: 72 BPM
P AXIS: 67 DEGREES
PR INTERVAL: 144 MS
QRS AXIS: 65 DEGREES
QRSD INTERVAL: 72 MS
QT INTERVAL: 378 MS
QTC INTERVAL: 413 MS
T WAVE AXIS: 37 DEGREES
VENTRICULAR RATE: 72 BPM

## 2021-03-12 PROCEDURE — 93005 ELECTROCARDIOGRAM TRACING: CPT

## 2021-03-12 PROCEDURE — 93010 ELECTROCARDIOGRAM REPORT: CPT | Performed by: INTERNAL MEDICINE

## 2021-03-16 ENCOUNTER — OFFICE VISIT (OUTPATIENT)
Dept: BARIATRICS | Facility: CLINIC | Age: 26
End: 2021-03-16

## 2021-03-16 DIAGNOSIS — R63.5 ABNORMAL WEIGHT GAIN: ICD-10-CM

## 2021-03-16 PROCEDURE — RECHECK

## 2021-03-16 PROCEDURE — WMPFE WEIGHT MANAGEMENT PRO FEE EMPLOYEE

## 2021-03-16 NOTE — PROGRESS NOTES
Weight Management Medical Nutrition Assessment  Eunice Eastman is here for initial RD session for Healthy Core--alternate  Current wt: 177 lbs  In process of starting saxenda  Has been changing eating and focusing on smaller portions as well as lower carbs  Has been trying juicing  Discussed how this is all carb and does likely contribute to excess calories  If she is going to use would decrease portion and add protein  She is interested in using 1-2 meal replacements/day  Recently started back at the gym  Meal plan provided  Patient seen by Medical Provider in past 6 months:  yes  Requested to schedule appointment with Medical Provider: Yes    Anthropometric Measurements  Start Weight (#):  177 lbs 3/16/21  Ideal Body Weight (#): 97 5 lbs  Goal Weight (#): 140 lbs  Highest: 185 lbs   Lowest: 135 lbs    Weight Loss History  Previous weight loss attempts: Exercise  Self Created Diets (Portion Control, Healthy Food Choices, etc )    Food and Nutrition Related History  Wake up:  4:45, work out 60 minutes at the gym   Bed Time: 9-12    Food Recall  Breakfast: 7:30: 1 cup pineapple, tameka, lemon, 1/2 cucumber  9:00: chobani or siggi's greek yogurt   Lunch: 12:00-12:15: 1/2 cup zucchini noodles, ~6 oz chicken  Snack: skip OR bar  Dinner: 5:30-6:00: salad, ~3 oz chicken, 1 egg, olive oil & vinegar OR 2 egg whites OR 1 chicken burger, 1 egg white  Snack: 9:30 pineapple, tameka, lemon, cucumber    Beverages: water and juice  Volume of beverage intake: >64 oz    Weekends: Same  Cravings: rice/pasta  Trouble area of day: n/a    Frequency of Eating out: 1x/wk  Food restrictions: n/a  Cooking: self   Food Shopping: self    Physical Activity Intake  Activity:HIIT  Frequency:5x/wk  Physical limitations/barriers to exercise: n/a    Estimated Needs  Energy  [employer] [place] Energy Needs:  BMR :  2156 1-2# loss weekly sedentary:  [phone]            1-2# loss weekly lightly active: 0877-7102  Maintenance calories for sedentary activity level:  1754  Protein:53-66 gm      (1 2-1 5g/kg IBW)  Fluid: 52 oz     (35mL/kg IBW)    Nutrition Diagnosis  Yes; Overweight/obesity  related to Excess energy intake as evidenced by  BMI more than normative standard for age and sex (obesity-grade II 35-39  9)       Nutrition Intervention    Nutrition Prescription  Calories: 850-1350  Protein: 67-99 gm    Meal Plan (Dinesh/Pro/Carb)  Breakfast: 150-200, 15-27, 10-15  Snack: 100-150, 5-10  Lunch: 200-350, 21-27, 10-25  Snack: 100-150, 5-10, 10-20  Dinner: 300-350, 21, 25  Snack: 0-150, 0-10, 10-20    Nutrition Education:    Healthy Core Manual  Calorie controlled menu  Lean protein food choices  Healthy snack options  Food journaling tips      Nutrition Counseling:  Strategies: meal planning, portion sizes, healthy snack choices, hydration, fiber intake, protein intake, exercise, food journal      Monitoring and Evaluation:  Evaluation criteria:  Energy Intake  Meet protein needs  Maintain adequate hydration  Monitor weekly weight  Meal planning/preparation  Food journal   Decreased portions at mealtimes and snacks  Physical activity     Barriers to learning:none  Readiness to change: Preparation:  (Getting ready to change)   Comprehension: good  Expected Compliance: good

## 2021-03-17 VITALS — WEIGHT: 177 LBS | BODY MASS INDEX: 34.75 KG/M2 | HEIGHT: 60 IN

## 2021-04-01 ENCOUNTER — OFFICE VISIT (OUTPATIENT)
Dept: BARIATRICS | Facility: CLINIC | Age: 26
End: 2021-04-01

## 2021-04-01 VITALS — WEIGHT: 171.4 LBS | HEIGHT: 60 IN | BODY MASS INDEX: 33.65 KG/M2

## 2021-04-01 DIAGNOSIS — R63.5 ABNORMAL WEIGHT GAIN: Primary | ICD-10-CM

## 2021-04-01 PROCEDURE — RECHECK

## 2021-04-01 NOTE — PROGRESS NOTES
Weight Management Medical Nutrition Assessment  Peace Johns is here for f/u RD session for Healthy Core--alternate  Current wt: 171 4 lbs  Loss of 5 6 lbs x 2 wks  Seca completed and results reviewed  Finding the meal replacements beneficial  Being mindful of portion sizes, weighing & measuring  Has increased exercise to 4x/wk  Overall calorie intake is too low  ? If this is r/t saxenda  Reports some days as low as 360 calories and even on a better day she is under 800  Discussed importance of adequate calorie intake for overall health as well as weight loss  Recommend no lower than 850 calories on rest and 1000 on exercise  She does admit to feeling hungry after her workout  Recommend eating right after and then using breakfast a little bit later in the morning  Examples of items ~300 calories reviewed  She does f/u with MD next week and will discuss this at that time if no improvement       Patient seen by Medical Provider in past 6 months:  yes  Requested to schedule appointment with Medical Provider: No    Anthropometric Measurements  Start Weight (#):  177 lbs 3/16/21  Current wt: 171 4 lbs  Ideal Body Weight (#): 97 5 lbs  Goal Weight (#): 140 lbs  Highest: 185 lbs   Lowest: 135 lbs    Weight Loss History  Previous weight loss attempts: Exercise  Self Created Diets (Portion Control, Healthy Food Choices, etc )    Food and Nutrition Related History  Wake up:  4:45, work out 60 minutes at the gym   Bed Time: 9-12    Food Recall  Breakfast: 7:30: 1 cup pineapple, tameka, lemon, 1/2 cucumber  9:00: chobani or siggi's greek yogurt   Lunch: 12:00-12:15: 1/2 cup zucchini noodles, ~6 oz chicken  Snack: skip OR bar  Dinner: 5:30-6:00: salad, ~3 oz chicken, 1 egg, olive oil & vinegar OR 2 egg whites OR 1 chicken burger, 1 egg white  Snack: 9:30 pineapple, tameka, lemon, cucumber    Beverages: water and juice  Volume of beverage intake: >64 oz    Weekends: Same  Cravings: rice/pasta  Trouble area of day: n/a    Frequency of Eating out: 1x/wk  Food restrictions: n/a  Cooking: self   Food Shopping: self    Physical Activity Intake  Activity:HIIT  Frequency:5x/wk  Physical limitations/barriers to exercise: n/a    Estimated Needs  Energy  Seca BMR 1639x1  [phone]  [employer] [place] Energy Needs: BMR :  1662 1-2# loss weekly sedentary:  723-1223            1-2# loss weekly lightly active: 975-1475  Maintenance calories for sedentary activity level:  1723  Protein:53-66 gm      (1 2-1 5g/kg IBW)  Fluid: 52 oz     (35mL/kg IBW)    Nutrition Diagnosis  Yes; Overweight/obesity  related to Excess energy intake as evidenced by  BMI more than normative standard for age and sex (obesity-grade I 26-30  9)       Nutrition Intervention    Nutrition Prescription  Calories: 850-1350  Protein: 67-99 gm    Meal Plan (Dinesh/Pro/Carb)  Breakfast: 150-200, 15-27, 10-15  Snack: 100-150, 5-10  Lunch: 200-350, 21-27, 10-25  Snack: 100-150, 5-10, 10-20  Dinner: 300-350, 21, 25  Snack: 0-150, 0-10, 10-20    Nutrition Education:    Healthy Core Manual  Calorie controlled menu  Lean protein food choices  Healthy snack options  Food journaling tips      Nutrition Counseling:  Strategies: meal planning, portion sizes, healthy snack choices, hydration, fiber intake, protein intake, exercise, food journal      Monitoring and Evaluation:  Evaluation criteria:  Energy Intake  Meet protein needs  Maintain adequate hydration  Monitor weekly weight  Meal planning/preparation  Food journal   Decreased portions at mealtimes and snacks  Physical activity     Barriers to learning:none  Readiness to change: Action:  (Changing behavior)  Comprehension: good  Expected Compliance: good

## 2021-04-06 NOTE — PROGRESS NOTES
Assessment/Plan:    No problem-specific Assessment & Plan notes found for this encounter  Diagnoses and all orders for this visit:    Class II obesity    Weight gain        -Patient is pursuing Conservative Program + RD bundle  -Initial weight loss goal of 5-10% weight loss for improved health  -Screening labs-7/3/20  - Discussed options for AOM, she is interested in AOM for helping with long term weight loss in conjunction to lifestyle changes  - Patient denies personal and family history of MCT, MEN2 tumors and medullary CA  Patient denies personal history of pancreatitis  - on saxenda tolerating without side effects  Initial: 181 7lbs  Current: 171lbs  Change: -10 7lbs  Goal: 140lbs    Goals:  Food log (ie ) www myfitnesspal com,sparkpeople  com,loseit com,calorieking  com,etc  baritastic  No sugary beverages  At least 64oz of water daily  Increase physical activity by 10 minutes daily  Gradually increase physical activity to a goal of 5 days per week for 30 minutes of MODERATE intensity PLUS 2 days per week of FULL BODY resistance training  Follow up in approximately 2 months with Non-Surgical Physician/Advanced Practitioner  Subjective:   Chief Complaint   Patient presents with    Follow-up     mwm 1 month follow up       Patient ID: Darin Roland  is a 32 y o  female with excess weight/obesity here to pursue weight management  Patient is pursuing HealthyCORE-Intensive Lifestyle Intervention Program modified (no classes)    HPI     1 mo follow up    Pt started modified HC (no classes), started on saxenda last visit  Currently On saxenda 2 4mg qd   Tolerating well    Nutrition: 1000 cals a day  Exercise: 4 times a week    Water- meeting goal    Cravings or hunger: no      The following portions of the patient's history were reviewed and updated as appropriate: allergies, current medications, past family history, past medical history, past social history, past surgical history and problem list     Review of Systems   Constitutional: Negative for activity change and appetite change  Respiratory: Negative  Cardiovascular: Negative  Gastrointestinal: Negative  Genitourinary: Negative  Musculoskeletal: Negative for arthralgias  Skin: Negative for rash  Psychiatric/Behavioral: Negative  Objective:    /88 (BP Location: Left arm, Patient Position: Sitting, Cuff Size: Large)   Pulse 96   Temp 98 2 °F (36 8 °C)   Ht 4' 11 5" (1 511 m)   Wt 77 6 kg (171 lb)   BMI 33 96 kg/m²      Physical Exam     Constitutional   General appearance: Abnormal   well developed and obese  Eyes No conjunctival pallor     Ears, Nose, Mouth, and Throat Oral mucosa moist    Musculoskeletal   Gait and station: Normal     Psychiatric   Orientation to person, place and time: Normal     Affect: appropriate

## 2021-04-07 ENCOUNTER — OFFICE VISIT (OUTPATIENT)
Dept: BARIATRICS | Facility: CLINIC | Age: 26
End: 2021-04-07
Payer: COMMERCIAL

## 2021-04-07 VITALS
TEMPERATURE: 98.2 F | SYSTOLIC BLOOD PRESSURE: 130 MMHG | HEIGHT: 60 IN | HEART RATE: 96 BPM | DIASTOLIC BLOOD PRESSURE: 88 MMHG | WEIGHT: 171 LBS | BODY MASS INDEX: 33.57 KG/M2

## 2021-04-07 DIAGNOSIS — R63.5 WEIGHT GAIN: ICD-10-CM

## 2021-04-07 DIAGNOSIS — E66.9 CLASS II OBESITY: Primary | ICD-10-CM

## 2021-04-07 PROCEDURE — 99214 OFFICE O/P EST MOD 30 MIN: CPT | Performed by: FAMILY MEDICINE

## 2021-04-14 ENCOUNTER — IMMUNIZATIONS (OUTPATIENT)
Dept: FAMILY MEDICINE CLINIC | Facility: HOSPITAL | Age: 26
End: 2021-04-14

## 2021-04-14 DIAGNOSIS — Z23 ENCOUNTER FOR IMMUNIZATION: Primary | ICD-10-CM

## 2021-04-14 PROCEDURE — 91300 SARS-COV-2 / COVID-19 MRNA VACCINE (PFIZER-BIONTECH) 30 MCG: CPT

## 2021-04-14 PROCEDURE — 0001A SARS-COV-2 / COVID-19 MRNA VACCINE (PFIZER-BIONTECH) 30 MCG: CPT

## 2021-04-15 ENCOUNTER — OFFICE VISIT (OUTPATIENT)
Dept: BARIATRICS | Facility: CLINIC | Age: 26
End: 2021-04-15

## 2021-04-15 VITALS — BODY MASS INDEX: 32.94 KG/M2 | HEIGHT: 60 IN | WEIGHT: 167.8 LBS

## 2021-04-15 DIAGNOSIS — R63.5 ABNORMAL WEIGHT GAIN: Primary | ICD-10-CM

## 2021-04-15 PROCEDURE — RECHECK

## 2021-04-15 NOTE — PROGRESS NOTES
Weight Management Medical Nutrition Assessment  Fani Conde is here for f/u RD session for Healthy Core--alternate  Current wt: 167 8 lbs  Loss of 3 6 lbs x 2 wks with overall loss of 9 2 lbs x 1 month  Doing much better since increasing calories as recommended at last visit  Consuming 1100 calories on exercise days & on rest days 900 calories  Notices a difference in energy level and quality of workouts  Fluid intake remains good  She will continue in alternate healthy core and f/u in 3 wks  Patient seen by Medical Provider in past 6 months:  yes  Requested to schedule appointment with Medical Provider: No    Anthropometric Measurements  Start Weight (#):  177 lbs 3/16/21  Current wt: 167 8 lbs  Ideal Body Weight (#): 97 5 lbs  Goal Weight (#): 140 lbs  Highest: 185 lbs   Lowest: 135 lbs    Weight Loss History  Previous weight loss attempts: Exercise  Self Created Diets (Portion Control, Healthy Food Choices, etc )    Food and Nutrition Related History  Wake up:  4:45, work out 60 minutes at the gym   Bed Time: 9-12    Food Recall  Breakfast: 6:40: english muffin almond butter OR oatmeal OR bar    9:00: meal replacement   Lunch: 12:00-12:15: 1/2 cup zucchini noodles, ~2 oz chicken, sometimes 1/3 cup rice  Snack:   Bar OR cheese stick OR almonds OR nuts  Dinner: 5:30-6:00: salad, ~3 oz chicken, veggies  Snack: 9:30 skip    Beverages: water and juice  Volume of beverage intake: >64 oz    Weekends: Same  Cravings: rice/pasta  Trouble area of day: n/a    Frequency of Eating out: 1x/wk  Food restrictions: n/a  Cooking: self   Food Shopping: self    Physical Activity Intake  Activity:HIIT  Frequency:5x/wk  Physical limitations/barriers to exercise: n/a    Estimated Needs  Energy  [place] [employer] Needs:  BMR :  3251;  1-1 5# loss weekly sedentary:  326-6635            1-2# loss weekly lightly active: [phone]  Maintenance calories for sedentary activity level:  1698  Protein:53-66 gm      (1 2-1 5g/kg IBW)  Fluid: 52 oz     (35mL/kg IBW)    Nutrition Diagnosis  Yes; Overweight/obesity  related to Excess energy intake as evidenced by  BMI more than normative standard for age and sex (obesity-grade I 26-30  9)       Nutrition Intervention    Nutrition Prescription  Calories: 850-1350  Protein: 67-99 gm    Meal Plan (Dinesh/Pro/Carb)  Breakfast: 150-200, 15-27, 10-15  Snack: 100-150, 5-10  Lunch: 200-350, 21-27, 10-25  Snack: 100-150, 5-10, 10-20  Dinner: 300-350, 21, 25  Snack: 0-150, 0-10, 10-20    Nutrition Education:    Healthy Core Manual  Calorie controlled menu  Lean protein food choices  Healthy snack options  Food journaling tips      Nutrition Counseling:  Strategies: meal planning, portion sizes, healthy snack choices, hydration, fiber intake, protein intake, exercise, food journal      Monitoring and Evaluation:  Evaluation criteria:  Energy Intake  Meet protein needs  Maintain adequate hydration  Monitor weekly weight  Meal planning/preparation  Food journal   Decreased portions at mealtimes and snacks  Physical activity     Barriers to learning:none  Readiness to change: Action:  (Changing behavior)  Comprehension: good  Expected Compliance: good

## 2021-04-20 DIAGNOSIS — E66.9 CLASS II OBESITY: ICD-10-CM

## 2021-04-20 DIAGNOSIS — R63.5 WEIGHT GAIN: ICD-10-CM

## 2021-05-06 ENCOUNTER — IMMUNIZATIONS (OUTPATIENT)
Dept: FAMILY MEDICINE CLINIC | Facility: HOSPITAL | Age: 26
End: 2021-05-06

## 2021-05-06 DIAGNOSIS — Z23 ENCOUNTER FOR IMMUNIZATION: Primary | ICD-10-CM

## 2021-05-06 PROCEDURE — 91300 SARS-COV-2 / COVID-19 MRNA VACCINE (PFIZER-BIONTECH) 30 MCG: CPT

## 2021-05-06 PROCEDURE — 0002A SARS-COV-2 / COVID-19 MRNA VACCINE (PFIZER-BIONTECH) 30 MCG: CPT

## 2021-05-21 ENCOUNTER — TRANSCRIBE ORDERS (OUTPATIENT)
Dept: ADMINISTRATIVE | Facility: HOSPITAL | Age: 26
End: 2021-05-21

## 2021-05-21 ENCOUNTER — APPOINTMENT (OUTPATIENT)
Dept: LAB | Facility: MEDICAL CENTER | Age: 26
End: 2021-05-21

## 2021-05-21 DIAGNOSIS — Z00.8 HEALTH EXAMINATION IN POPULATION SURVEY: Primary | ICD-10-CM

## 2021-05-21 DIAGNOSIS — Z00.8 HEALTH EXAMINATION IN POPULATION SURVEY: ICD-10-CM

## 2021-05-21 LAB
CHOLEST SERPL-MCNC: 129 MG/DL (ref 50–200)
EST. AVERAGE GLUCOSE BLD GHB EST-MCNC: 94 MG/DL
HBA1C MFR BLD: 4.9 %
HDLC SERPL-MCNC: 36 MG/DL
LDLC SERPL CALC-MCNC: 85 MG/DL (ref 0–100)
NONHDLC SERPL-MCNC: 93 MG/DL
TRIGL SERPL-MCNC: 39 MG/DL

## 2021-05-21 PROCEDURE — 83036 HEMOGLOBIN GLYCOSYLATED A1C: CPT

## 2021-05-21 PROCEDURE — 80061 LIPID PANEL: CPT

## 2021-05-21 PROCEDURE — 36415 COLL VENOUS BLD VENIPUNCTURE: CPT

## 2021-06-01 DIAGNOSIS — E66.9 CLASS II OBESITY: ICD-10-CM

## 2021-06-01 DIAGNOSIS — R63.5 WEIGHT GAIN: ICD-10-CM

## 2021-06-10 ENCOUNTER — TELEPHONE (OUTPATIENT)
Dept: BARIATRICS | Facility: CLINIC | Age: 26
End: 2021-06-10

## 2021-06-10 DIAGNOSIS — R63.5 WEIGHT GAIN: ICD-10-CM

## 2021-06-10 DIAGNOSIS — E66.9 CLASS II OBESITY: ICD-10-CM

## 2021-06-10 NOTE — TELEPHONE ENCOUNTER
Yes, go ahead and put the refill request in for me  You dont have to ask me prior just send me in the future the refill request directly   thanks

## 2021-06-21 ENCOUNTER — OFFICE VISIT (OUTPATIENT)
Dept: BARIATRICS | Facility: CLINIC | Age: 26
End: 2021-06-21
Payer: COMMERCIAL

## 2021-06-21 VITALS
HEIGHT: 60 IN | DIASTOLIC BLOOD PRESSURE: 70 MMHG | WEIGHT: 162.3 LBS | SYSTOLIC BLOOD PRESSURE: 100 MMHG | HEART RATE: 85 BPM | BODY MASS INDEX: 31.86 KG/M2

## 2021-06-21 DIAGNOSIS — E66.9 CLASS II OBESITY: ICD-10-CM

## 2021-06-21 DIAGNOSIS — R63.5 WEIGHT GAIN: ICD-10-CM

## 2021-06-21 DIAGNOSIS — R63.5 WEIGHT GAIN: Primary | ICD-10-CM

## 2021-06-21 PROCEDURE — 99214 OFFICE O/P EST MOD 30 MIN: CPT | Performed by: FAMILY MEDICINE

## 2021-06-21 NOTE — PROGRESS NOTES
Assessment/Plan:    No problem-specific Assessment & Plan notes found for this encounter  Diagnoses and all orders for this visit:    Weight gain  -     Insulin Pen Needle (NovoFine) 32G X 6 MM MISC; Use daily    Class II obesity  -     Insulin Pen Needle (NovoFine) 32G X 6 MM MISC; Use daily        -Patient is pursuing Conservative Program + RD bundle  -Initial weight loss goal of 5-10% weight loss for improved health  -Screening labs-7/3/20, 5/2021  - on Saxenda, tolerating well  - will order labs next visit  Initial: 181 7lbs  Current: 162lbs  Change: -19 7lbs (-5 8lbs since last visit)  Goal: 140lbs     Goals:  Food log (ie ) www myfitnesspal com,sparkpeople  com,loseit com,calorieking  com,etc  baritastic  No sugary beverages  At least 64oz of water daily  Increase physical activity by 10 minutes daily  Gradually increase physical activity to a goal of 5 days per week for 30 minutes of MODERATE intensity PLUS 2 days per week of FULL BODY resistance training  Nutrition Prescription  Calories: 850-1350  Protein: 67-99 gm     Follow up in approximately 3 months with Non-Surgical Physician/Advanced Practitioner  Subjective:   Chief Complaint   Patient presents with    Follow-up       Patient ID: Nellie Hernandez  is a 32 y o  female with excess weight/obesity here to pursue weight management  Patient is pursuing Conservative Program      HPI     2 mo follow up    Currently On saxenda  Tolerating well     Nutrition: 1000 cals a day  Exercise: 4 times a week     Water- meeting goal     Cravings or hunger: no    The following portions of the patient's history were reviewed and updated as appropriate: allergies, current medications, past family history, past medical history, past social history, past surgical history and problem list     Review of Systems   Constitutional: Negative for activity change and appetite change  Respiratory: Negative  Cardiovascular: Negative  Gastrointestinal: Negative  Genitourinary: Negative  Musculoskeletal: Negative for arthralgias  Skin: Negative for rash  Psychiatric/Behavioral: Negative  Objective:    /70 (BP Location: Right arm, Patient Position: Sitting, Cuff Size: Large)   Pulse 85   Ht 4' 11 5" (1 511 m)   Wt 73 6 kg (162 lb 4 8 oz)   BMI 32 23 kg/m²      Physical Exam    Constitutional   General appearance: Abnormal   well developed and obese  Eyes No conjunctival pallor     Musculoskeletal   Gait and station: Normal     Psychiatric   Orientation to person, place and time: Normal     Affect: appropriate

## 2021-07-14 DIAGNOSIS — R63.5 WEIGHT GAIN: ICD-10-CM

## 2021-07-14 DIAGNOSIS — E66.9 CLASS II OBESITY: ICD-10-CM

## 2021-07-19 ENCOUNTER — OFFICE VISIT (OUTPATIENT)
Dept: URGENT CARE | Facility: MEDICAL CENTER | Age: 26
End: 2021-07-19
Payer: COMMERCIAL

## 2021-07-19 VITALS
TEMPERATURE: 98.5 F | SYSTOLIC BLOOD PRESSURE: 109 MMHG | HEIGHT: 59 IN | OXYGEN SATURATION: 99 % | BODY MASS INDEX: 31.85 KG/M2 | DIASTOLIC BLOOD PRESSURE: 67 MMHG | WEIGHT: 158 LBS | RESPIRATION RATE: 20 BRPM | HEART RATE: 82 BPM

## 2021-07-19 DIAGNOSIS — L02.211 ABSCESS OF SKIN OF ABDOMEN: Primary | ICD-10-CM

## 2021-07-19 PROCEDURE — 10060 I&D ABSCESS SIMPLE/SINGLE: CPT | Performed by: PHYSICIAN ASSISTANT

## 2021-07-19 PROCEDURE — 87070 CULTURE OTHR SPECIMN AEROBIC: CPT | Performed by: PHYSICIAN ASSISTANT

## 2021-07-19 PROCEDURE — 87205 SMEAR GRAM STAIN: CPT | Performed by: PHYSICIAN ASSISTANT

## 2021-07-19 PROCEDURE — G0382 LEV 3 HOSP TYPE B ED VISIT: HCPCS | Performed by: PHYSICIAN ASSISTANT

## 2021-07-19 PROCEDURE — 87186 SC STD MICRODIL/AGAR DIL: CPT | Performed by: PHYSICIAN ASSISTANT

## 2021-07-19 RX ORDER — SULFAMETHOXAZOLE AND TRIMETHOPRIM 800; 160 MG/1; MG/1
1 TABLET ORAL EVERY 12 HOURS SCHEDULED
Qty: 14 TABLET | Refills: 0 | Status: SHIPPED | OUTPATIENT
Start: 2021-07-19 | End: 2021-07-26

## 2021-07-19 NOTE — PATIENT INSTRUCTIONS
Take antibiotic as directed until completed  Motrin and/or tylenol as needed for pain  Take packing out in 2 days  Follow up with PCP in 3-5 days  Proceed to  ER if symptoms worsen  Abscess   AMBULATORY CARE:   An abscess  is an area under the skin where pus (infected fluid) collects  An abscess is often caused by bacteria, fungi or other germs that get into an open wound  You can get an abscess anywhere on your body  Common signs and symptoms of an abscess: You may have a swollen mass that is red and painful  Pus may leak out of the mass  The pus will be white or yellow and may smell bad  You may have redness and pain days before the mass appears  You may have a fever and chills if the infection spreads  Seek immediate care if:   · The area around your abscess becomes very painful, warm, or has red streaks  · You have a fever and chills  · Your heart is beating faster than usual      · You feel faint or confused  Contact your healthcare provider if:   · Your abscess gets bigger or does not get better  · Your abscess returns  · You have questions or concerns about your condition or care  Treatment for an abscess: Your healthcare provider may need to make a cut in the abscess to allow the pus to drain  You may need surgery to remove your abscess  You may  need any of the following:  · Antibiotics  help treat a bacterial infection  · Acetaminophen  decreases pain and fever  It is available without a doctor's order  Ask how much to take and how often to take it  Follow directions  Acetaminophen can cause liver damage if not taken correctly  · NSAIDs , such as ibuprofen, help decrease swelling, pain, and fever  This medicine is available with or without a doctor's order  NSAIDs can cause stomach bleeding or kidney problems in certain people  If you take blood thinner medicine, always ask your healthcare provider if NSAIDs are safe for you   Always read the medicine label and follow directions  · Take your medicine as directed  Contact your healthcare provider if you think your medicine is not helping or if you have side effects  Tell him or her if you are allergic to any medicine  Keep a list of the medicines, vitamins, and herbs you take  Include the amounts, and when and why you take them  Bring the list or the pill bottles to follow-up visits  Carry your medicine list with you in case of an emergency  Self-care:   · Apply a warm compress to your abscess  This will help it open and drain  Wet a washcloth in warm, but not hot, water  Apply the compress for 10 minutes  Repeat this 4 times each day  Do not  press on an abscess or try to open it with a needle  You may push the bacteria deeper or into your blood  · Do not share your clothes, towels, or sheets with anyone  This can spread the infection to others  · Wash your hands often  This can help prevent the spread of germs  Use soap and water or an alcohol-based hand rub  Care for your wound after it is drained:   · Care for your wound as directed  If your healthcare provider says it is okay, carefully remove the bandage and gauze packing  You may need to soak the gauze to get it out of your wound  Clean your wound and the area around it as directed  Dry the area and put on new, clean bandages  Change your bandages when they get wet or dirty  · Ask your healthcare provider how to change the gauze in your wound  Keep track of how many pieces of gauze are placed inside the wound  Do not put too much packing in the wound  Do not pack the gauze too tightly in your wound  Follow up with your healthcare provider in 1 to 3 days: You may need to have your packing removed or your bandage changed  Write down your questions so you remember to ask them during your visits  © Copyright [employer] [facility] [address] Information is for End User's use only and may not be sold, redistributed or otherwise used for commercial purposes  All illustrations and images included in CareNotes® are the copyrighted property of A D A M , Inc  or Matthew Bustamante  The above information is an  only  It is not intended as medical advice for individual conditions or treatments  Talk to your doctor, nurse or pharmacist before following any medical regimen to see if it is safe and effective for you

## 2021-07-19 NOTE — PROGRESS NOTES
[facility]        NAME: Erika Fernández is a 32 y o  female  : 1995    MRN: [id number]  DATE: 2021  TIME: 11:55 AM    Assessment and Plan   Abscess of skin of abdomen [L02 211]  1  Abscess of skin of abdomen  sulfamethoxazole-trimethoprim (BACTRIM DS) 800-160 mg per tablet    Wound culture and Gram stain         Patient Instructions     Take antibiotic as directed until completed  Motrin and/or tylenol as needed for pain  Take packing out in 2 days  Follow up with PCP in 3-5 days  Proceed to  ER if symptoms worsen  Chief Complaint     Chief Complaint   Patient presents with    Abscess     Patietn states she has recurrent abcesses on her abd  She states she starte Tuesday with this abcess and it popped on Sat  she is still having pain and it is draining Abcess is located in her LLQ  It is red and firm         History of Present Illness        35-year-old female presents with abscess on abdomen  Patient reports on Tuesday started with a pimple on her left lower abdomen area and since then has gotten larger and bigger with more pain redness swelling  Also having some drainage from there  Denies any fevers or chills  No chest pain shortness of breath or cough  Rash  This is a new problem  The current episode started in the past 7 days  The problem has been gradually worsening since onset  Location: Left lower abdomen area  The rash is characterized by pain, redness, swelling and draining  She was exposed to nothing  Pertinent negatives include no diarrhea, fever, rhinorrhea, shortness of breath or vomiting  Past treatments include nothing  The treatment provided no relief  Review of Systems   Review of Systems   Constitutional: Negative  Negative for fever  HENT: Negative  Negative for rhinorrhea  Respiratory: Negative  Negative for shortness of breath  Cardiovascular: Negative  Gastrointestinal: Negative  Negative for diarrhea and vomiting     Musculoskeletal: Negative  Skin: Positive for rash and wound  Neurological: Negative  Current Medications       Current Outpatient Medications:     Insulin Pen Needle (NovoFine) 32G X 6 MM MISC, Use daily, Disp: 90 each, Rfl: 1    liraglutide (SAXENDA) injection, Inject 3mg subcutaneously once per day , Disp: 45 mL, Rfl: 1    sulfamethoxazole-trimethoprim (BACTRIM DS) 800-160 mg per tablet, Take 1 tablet by mouth every 12 (twelve) hours for 7 days, Disp: 14 tablet, Rfl: 0    Current Allergies     Allergies as of 2021 - Reviewed 2021   Allergen Reaction Noted    Aspirin  2017            The following portions of the patient's history were reviewed and updated as appropriate: allergies, current medications, past family history, past medical history, past social history, past surgical history and problem list      Past Medical History:   Diagnosis Date    Migraine     Placenta previa antepartum in first trimester     Varicella     As a child    Von Willebrand disease ([facility] [place] [address] )        Past Surgical History:   Procedure Laterality Date    ADENOIDECTOMY      CHOLECYSTECTOMY      2017    GALLBLADDER SURGERY      removal- resolved    [place]  DELIVERY ONLY N/A 2018    Procedure:  SECTION (); Surgeon: Thelma Mercer MD;  Location: [facility];  Service: Obstetrics    TONSILLECTOMY      in        Family History   Problem Relation Age of Onset    Diabetes Paternal Grandmother     Hypertension Paternal Grandmother     Diabetes Paternal Grandfather     No Known Problems Mother     No Known Problems Father     No Known Problems Sister     No Known Problems Brother          Medications have been verified  Objective   /67   Pulse 82   Temp 98 5 °F (36 9 °C)   Resp 20   Ht 4' 11" (1 499 m)   Wt 71 7 kg (158 lb)   LMP 2021   SpO2 99%   BMI 31 91 kg/m²   Patient's last menstrual period was 2021         Physical Exam     Physical Exam  Vitals and nursing note reviewed  Constitutional:       General: She is not in acute distress  Appearance: She is well-developed  HENT:      Head: Normocephalic and atraumatic  Right Ear: Hearing, tympanic membrane, ear canal and external ear normal       Left Ear: Hearing, tympanic membrane, ear canal and external ear normal       Nose: Nose normal       Mouth/Throat:      Pharynx: Uvula midline  No oropharyngeal exudate  Eyes:      General:         Right eye: No discharge  Left eye: No discharge  Conjunctiva/sclera: Conjunctivae normal    Cardiovascular:      Rate and Rhythm: Normal rate and regular rhythm  Heart sounds: Normal heart sounds  No murmur heard  Pulmonary:      Effort: Pulmonary effort is normal  No respiratory distress  Breath sounds: Normal breath sounds  No wheezing or rales  Abdominal:      General: Bowel sounds are normal       Palpations: Abdomen is soft  Tenderness: There is no abdominal tenderness  Musculoskeletal:         General: Normal range of motion  Cervical back: Normal range of motion and neck supple  Lymphadenopathy:      Cervical: No cervical adenopathy  Skin:     General: Skin is warm and dry  Findings: Abscess present  Neurological:      Mental Status: She is alert and oriented to person, place, and time  Incision and drain    Date/Time: 7/19/2021 2:31 PM  Performed by: Clarisa Navas PA-C  Authorized by: Armand Johnson PA-C   [place]ong Protocol:  Consent: Verbal consent not obtained    Risks and benefits: risks, benefits and alternatives were discussed  Consent given by: patient  Patient understanding: patient states understanding of the procedure being performed  Patient identity confirmed: verbally with patient      Patient location:  Clinic  Location:     Type:  Abscess    Size:   large    Location:  Trunk    Trunk location:  Abdomen  Pre-procedure details:     Skin preparation:  Chloraprep  Anesthesia (see MAR for exact dosages): Anesthesia method:  Local infiltration    Local anesthetic:  Lidocaine 1% WITH epi  Procedure details:     Complexity:  Simple    Needle aspiration: no      Incision types:  Stab incision    Scalpel blade:  11    Incision depth:  Subcutaneous    Wound management:  Probed and deloculated, irrigated with saline, extensive cleaning and debrided    Drainage:  Purulent    Drainage amount:  Copious    Wound treatment:  Packing placed    Packing materials:  1/4 in gauze    Amount 1/4":   5 cm  Post-procedure details:     Patient tolerance of procedure:   Tolerated well, no immediate complications

## 2021-07-19 NOTE — LETTER
July 19, 2021     Patient: Royal Cavazos   YOB: 1995   Date of Visit: 7/19/2021       To Whom it May Concern:    Royal Cavazos was seen in my clinic on 7/19/2021  She may return to work on 07/21/2021  Patient may return sooner if symptoms have improved  If you have any questions or concerns, please don't hesitate to call  Sincerely,          Armand Johnson PA-C        CC: No Recipients

## 2021-07-21 ENCOUNTER — OFFICE VISIT (OUTPATIENT)
Dept: URGENT CARE | Facility: MEDICAL CENTER | Age: 26
End: 2021-07-21
Payer: COMMERCIAL

## 2021-07-21 VITALS
SYSTOLIC BLOOD PRESSURE: 114 MMHG | DIASTOLIC BLOOD PRESSURE: 78 MMHG | WEIGHT: 158 LBS | RESPIRATION RATE: 18 BRPM | BODY MASS INDEX: 31.91 KG/M2 | OXYGEN SATURATION: 96 % | HEART RATE: 80 BPM | TEMPERATURE: 96.6 F

## 2021-07-21 DIAGNOSIS — Z51.89 WOUND CHECK, ABSCESS: Primary | ICD-10-CM

## 2021-07-21 LAB
BACTERIA WND AEROBE CULT: ABNORMAL
GRAM STN SPEC: ABNORMAL
GRAM STN SPEC: ABNORMAL

## 2021-07-21 PROCEDURE — G0383 LEV 4 HOSP TYPE B ED VISIT: HCPCS | Performed by: PHYSICIAN ASSISTANT

## 2021-07-21 NOTE — PATIENT INSTRUCTIONS
Acute Wound Care   AMBULATORY CARE:   An acute wound  is an injury that causes a break in the skin  An acute wound can happen suddenly, last a short time, and may heal on its own  Common signs and symptoms of an acute wound:   · A cut, tear, or gash in your skin    · Bleeding, swelling, pain, or trouble moving the affected area    · Dirt or foreign objects inside the wound     · Milky, yellow, green, or brown pus in the wound     · Red, tender, or warm area around the pus    · Fever  Seek care immediately if:   · You have pus or a foul odor coming from the wound  · You have sudden trouble breathing or chest pain  · Blood soaks through your bandage  Contact your healthcare provider if:   · You have muscle, joint, or body aches, sweating, or a fever  · You have more swelling, redness, or bleeding in your wound  · Your skin is itchy, swollen, or you have a rash  · You have questions or concerns about your condition or care  Treatment for an acute wound  may include any of the following:  · Cleansing  is done with soap and water to wash away germs and decrease the risk of infection  Sterile water further cleans the wound  The cleaning is done under high pressure with a catheter tip and large syringe  A solution that kills germs may also be used  · Debridement  is done to clean and remove objects, dirt, or dead tissues from the open wound  Healthcare providers may also drain the wound to clean out pus  · Closure of the wound  is done with stitches, staples, skin adhesive, or other treatments  This may be done if the wound is wide or deep  Stitches may be needed if the wound is in an area that moves a lot, such as the hands, feet, and joints  Stitches may help to keep the wound from getting infected  They may also decrease the amount of scarring you have  Some wounds may heal better without stitches    Wound care:   · If your wound was closed with thin strips of medical tape, keep them clean and dry  The strips of medical tape will fall off on their own  Do not pull them off  · Keep the bandage clean and dry  Do not remove the bandage over your wound unless your healthcare provider says it is okay  · Wash your hands before and after you take care of your wound to prevent infection  · Clean the wound as directed  If you cannot reach the wound, have someone help you  · If you have packing, make sure all the gauze used to pack the wound is taken out and replaced as directed  Keep track of how many gauze dressings are placed inside the wound  Follow up with your healthcare provider as directed:  Write down your questions so you remember to ask them during your visits  © 2016 [address] is for End User's use only and may not be sold, redistributed or otherwise used for commercial purposes  All illustrations and images included in CareNotes® are the copyrighted property of A [employer] M , Inc  or Shin Mann  The above information is an  only  It is not intended as medical advice for individual conditions or treatments  Talk to your doctor, nurse or pharmacist before following any medical regimen to see if it is safe and effective for you

## 2021-07-21 NOTE — PROGRESS NOTES
[facility]        NAME: Pat Milian is a 32 y o  female  : 1995    MRN: [id number]  DATE: 2021  TIME: 12:47 PM    /78   Pulse 80   Temp (!) 96 6 °F (35 9 °C)   Resp 18   Wt 71 7 kg (158 lb)   LMP 2021   SpO2 96%   BMI 31 91 kg/m²     Assessment and Plan   Wound check, abscess [[id number]]  1  Wound check, abscess           Patient Instructions       Follow up with PCP in 3-5 days  Proceed to  ER if symptoms worsen  Chief Complaint     Chief Complaint   Patient presents with    Abscess     Patient here for wound check  She reports she had an Abd abscess drained and packed on Monday  Here to have packing removed and wound assessed  History of Present Illness       Pt with abscess recheck/ packing change from 2 days ago,  Pt reports much improved pain and swelling and redness       Review of Systems   Review of Systems   Constitutional: Negative  HENT: Negative  Eyes: Negative  Respiratory: Negative  Cardiovascular: Negative  Gastrointestinal: Negative  Endocrine: Negative  Genitourinary: Negative  Musculoskeletal: Negative  Allergic/Immunologic: Negative  Neurological: Negative  Hematological: Negative  Psychiatric/Behavioral: Negative  All other systems reviewed and are negative          Current Medications       Current Outpatient Medications:     Insulin Pen Needle (NovoFine) 32G X 6 MM MISC, Use daily, Disp: 90 each, Rfl: 1    liraglutide (SAXENDA) injection, Inject 3mg subcutaneously once per day , Disp: 45 mL, Rfl: 1    sulfamethoxazole-trimethoprim (BACTRIM DS) 800-160 mg per tablet, Take 1 tablet by mouth every 12 (twelve) hours for 7 days, Disp: 14 tablet, Rfl: 0    Current Allergies     Allergies as of 2021 - Reviewed 2021   Allergen Reaction Noted    Aspirin  2017            The following portions of the patient's history were reviewed and updated as appropriate: allergies, current medications, past family history, past medical history, past social history, past surgical history and problem list      Past Medical History:   Diagnosis Date    Migraine     Placenta previa antepartum in first trimester     Varicella     As a child    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[address] )        Past Surgical History:   Procedure Laterality Date    ADENOIDECTOMY      CHOLECYSTECTOMY      2017    GALLBLADDER SURGERY      removal- resolved    [place]  DELIVERY ONLY N/A 2018    Procedure:  SECTION (); Surgeon: Nelly Harris MD;  [facility];  Service: Obstetrics    TONSILLECTOMY      in        Family History   Problem Relation Age of Onset    Diabetes Paternal Grandmother     Hypertension Paternal Grandmother     Diabetes Paternal Grandfather     No Known Problems Mother     No Known Problems Father     No Known Problems Sister     No Known Problems Brother          Medications have been verified  Objective   /78   Pulse 80   Temp (!) 96 6 °F (35 9 °C)   Resp 18   Wt 71 7 kg (158 lb)   LMP 2021   SpO2 96%   BMI 31 91 kg/m²        Physical Exam     Physical Exam  Vitals and nursing note reviewed  Constitutional:       Appearance: Normal appearance  She is normal weight  HENT:      Head: Normocephalic and atraumatic  Right Ear: Tympanic membrane, ear canal and external ear normal       Left Ear: Tympanic membrane, ear canal and external ear normal       Nose: Nose normal       Mouth/Throat:      Mouth: Mucous membranes are moist       Pharynx: Oropharynx is clear  Eyes:      Extraocular Movements: Extraocular movements intact  Conjunctiva/sclera: Conjunctivae normal       Pupils: Pupils are equal, round, and reactive to light  Cardiovascular:      Rate and Rhythm: Normal rate  Pulses: Normal pulses  Pulmonary:      Effort: Pulmonary effort is normal       Breath sounds: Normal breath sounds     Abdominal:      General: Abdomen is flat  Bowel sounds are normal       Palpations: Abdomen is soft  Musculoskeletal:         General: Normal range of motion  Cervical back: Normal range of motion and neck supple  Skin:     General: Skin is warm  Capillary Refill: Capillary refill takes less than 2 seconds  Comments: Packing removed,  No d/c no blood ,  Minimal tenderness,  Discussed option of placing more packing for another few days, pt declines  Will recheck in 2 days    Neurological:      General: No focal deficit present  Mental Status: She is alert and oriented to person, place, and time     Psychiatric:         Mood and Affect: Mood normal          Behavior: Behavior normal

## 2021-10-26 NOTE — PROGRESS NOTES
Assessment     Pregnancy 32 and 3/7 weeks     Plan     28-week labs reviewed, normal  Discussed follow-up for VonWillebrand's disease  Follow up in 2 Weeks  F/u with [facility] for U/S today  Spoke with Pascual Sahu after OB visit to make sure patient schedules appointment with anesthesiologist   Will wait for recommendations from hematology after f/u next week  Stressed louiselmer mak Younger is a 21 y o  female being seen today for her obstetrical visit  She is at 32w3d gestation  Patient reports no bleeding, no leaking and BH contractions  Fetal movement: normal   Patient doing well overall  Had consult with hematology, and blood work was ordered  Patient has follow-up with their office next week  Has U/S at [facility] today; needs to schedule a consult with OB anesthesiologist  Patient was unable to find out if she had the Tdap when she started work  Experiencing BH contractions  Menstrual History:  OB History      Para Term  AB Living    1 0 0 0 0 0    SAB TAB Ectopic Multiple Live Births    0 0 0 0 0         Menarche age: N/A  Patient's last menstrual period was 2017  The following portions of the patient's history were reviewed and updated as appropriate: allergies, current medications, past medical history, past social history, past surgical history and problem list     Review of Systems  Pertinent items are noted in HPI       Objective     /60   Wt 83 7 kg (184 lb 9 6 oz)   LMP 2017   BMI 37 28 kg/m²   FHT:  144 BPM   Uterine Size: size equals dates   Presentation: unsure Patient expressed frustration and sadness with reduced physical functioning. Support rendered.  Patient exhibits no insight with regard to his prognosis.  Writer will look into having a meeting with medical team and palliative team together.

## 2021-11-17 DIAGNOSIS — E66.9 CLASS II OBESITY: ICD-10-CM

## 2021-11-17 DIAGNOSIS — R63.5 WEIGHT GAIN: ICD-10-CM

## 2021-11-18 RX ORDER — PEN NEEDLE, DIABETIC 32 GX 1/4"
NEEDLE, DISPOSABLE MISCELLANEOUS DAILY
Qty: 90 EACH | Refills: 1 | Status: SHIPPED | OUTPATIENT
Start: 2021-11-18 | End: 2022-03-18

## 2021-11-23 ENCOUNTER — TELEPHONE (OUTPATIENT)
Dept: BARIATRICS | Facility: CLINIC | Age: 26
End: 2021-11-23

## 2021-12-28 ENCOUNTER — NURSE TRIAGE (OUTPATIENT)
Dept: OTHER | Facility: OTHER | Age: 26
End: 2021-12-28

## 2021-12-28 DIAGNOSIS — Z20.828 SARS-ASSOCIATED CORONAVIRUS EXPOSURE: Primary | ICD-10-CM

## 2021-12-28 PROCEDURE — 87636 SARSCOV2 & INF A&B AMP PRB: CPT | Performed by: FAMILY MEDICINE

## 2022-02-03 ENCOUNTER — OFFICE VISIT (OUTPATIENT)
Dept: OBGYN CLINIC | Facility: MEDICAL CENTER | Age: 27
End: 2022-02-03
Payer: COMMERCIAL

## 2022-02-03 ENCOUNTER — OFFICE VISIT (OUTPATIENT)
Dept: BARIATRICS | Facility: CLINIC | Age: 27
End: 2022-02-03
Payer: COMMERCIAL

## 2022-02-03 VITALS
BODY MASS INDEX: 31.71 KG/M2 | HEIGHT: 60 IN | SYSTOLIC BLOOD PRESSURE: 118 MMHG | TEMPERATURE: 98.3 F | DIASTOLIC BLOOD PRESSURE: 80 MMHG | WEIGHT: 161.5 LBS | HEART RATE: 107 BPM

## 2022-02-03 VITALS — WEIGHT: 155 LBS | DIASTOLIC BLOOD PRESSURE: 70 MMHG | SYSTOLIC BLOOD PRESSURE: 120 MMHG | BODY MASS INDEX: 31.31 KG/M2

## 2022-02-03 DIAGNOSIS — Z97.5 IUD (INTRAUTERINE DEVICE) IN PLACE: ICD-10-CM

## 2022-02-03 DIAGNOSIS — E66.9 OBESITY, CLASS I, BMI 30-34.9: Primary | ICD-10-CM

## 2022-02-03 DIAGNOSIS — Z01.419 ENCOUNTER FOR ANNUAL ROUTINE GYNECOLOGICAL EXAMINATION: Primary | ICD-10-CM

## 2022-02-03 PROBLEM — E66.811 OBESITY, CLASS I, BMI 30-34.9: Status: ACTIVE | Noted: 2020-07-22

## 2022-02-03 PROCEDURE — S0612 ANNUAL GYNECOLOGICAL EXAMINA: HCPCS | Performed by: OBSTETRICS & GYNECOLOGY

## 2022-02-03 PROCEDURE — 99213 OFFICE O/P EST LOW 20 MIN: CPT | Performed by: PHYSICIAN ASSISTANT

## 2022-02-03 PROCEDURE — 1036F TOBACCO NON-USER: CPT | Performed by: PHYSICIAN ASSISTANT

## 2022-02-03 PROCEDURE — 3008F BODY MASS INDEX DOCD: CPT | Performed by: PHYSICIAN ASSISTANT

## 2022-02-03 NOTE — PROGRESS NOTES
Assessment/Plan:    Obesity, Class I, BMI 30-34 9  -Patient is pursuing Conservative Program + RD bundle  -Initial weight loss goal of 5-10% weight loss for improved health  - on Saxenda, tolerating well       Initial: 181 7 lbs  Current: 161 8 lbs  Change: -19 9 lbs   Goal: 140 lbs     Goals:  Food log (ie ) www myfitnesspal com,sparkpeople  com,loseit com,calorieking  com,etc  baritastic  No sugary beverages  At least 64oz of water daily  Increase physical activity by 10 minutes daily  Gradually increase physical activity to a goal of 5 days per week for 30 minutes of MODERATE intensity PLUS 2 days per week of FULL BODY resistance training  Nutrition Prescription  Calories: 850-1350  Protein: 67-99 gm  Keep up great work with exercise and water  Let me know about medication coverage         Follow up in approximately 2 months with Non-Surgical Physician/Advanced Practitioner  Diagnoses and all orders for this visit:    Obesity, Class I, BMI 30-34 9          Subjective:   Chief Complaint   Patient presents with    Follow-up        Patient ID: Tyra Babcock  is a 32 y o  female with excess weight/obesity here to pursue weight managment  Patient is pursuing Conservative Program      HPI  Last here in June 2021  Had previously been taking saxenda and she felt it helped bu stopped because the insurance did not approve it  Food logging:not consistently food logging   Exercise:3 times a week circuit training and hot yoga training one day a week   Hydration: 64-96 oz of water, 1 cup of coffee with creamer  NO ETOH     The following portions of the patient's history were reviewed and updated as appropriate: allergies, current medications, past family history, past medical history, past social history, past surgical history and problem list     Review of Systems   HENT: Negative for sore throat  Respiratory: Negative for cough and shortness of breath  Cardiovascular: Negative for chest pain and palpitations  Gastrointestinal: Negative for abdominal pain, constipation, diarrhea, nausea and vomiting  Denies GERD   Skin: Negative for rash  Psychiatric/Behavioral: Negative for suicidal ideas (denies HI)  Denies depression and anxiety       Objective:    /80 (BP Location: Left arm, Patient Position: Sitting)   Pulse (!) 107   Temp 98 3 °F (36 8 °C)   Ht 4' 11 5" (1 511 m)   Wt 73 3 kg (161 lb 8 oz)   LMP 01/15/2022 (Exact Date)   BMI 32 07 kg/m²      Physical Exam  Vitals and nursing note reviewed  Constitutional   General appearance: Abnormal   well developed and obese  Eyes No conjunctival injection   Pulmonary   Respiratory effort: No increased work of breathing or signs of respiratory distress  Abdomen   Abdomen: Abnormal   The abdomen was obese  Musculoskeletal   Gait and station: Normal     Skin  Dry   No visible rashes   Psychiatric   Orientation to person, place and time: Normal     Affect: appropriate  Neurological   Normal gait

## 2022-02-03 NOTE — ASSESSMENT & PLAN NOTE
-Patient is pursuing Conservative Program + RD bundle  -Initial weight loss goal of 5-10% weight loss for improved health  - on Saxenda, tolerating well       Initial: 181 7 lbs  Current: 161 8 lbs  Change: -19 9 lbs   Goal: 140 lbs     Goals:  Food log (ie ) www myfitnesspal com,sparkpeople  com,loseit com,calorieking  com,etc  baritastic  No sugary beverages  At least 64oz of water daily  Increase physical activity by 10 minutes daily  Gradually increase physical activity to a goal of 5 days per week for 30 minutes of MODERATE intensity PLUS 2 days per week of FULL BODY resistance training    Nutrition Prescription  Calories: 850-1350  Protein: 67-99 gm  Keep up great work with exercise and water  Let me know about medication coverage

## 2022-02-03 NOTE — PROGRESS NOTES
ASSESSMENT & PLAN: Chelsea Garrison is a 32 y o  Manuela Pion with normal gynecologic exam     1   Routine well woman exam done today  2  Pap:  The patient's last pap was   It was normal     Pap was not done today  Current ASCCP Guidelines reviewed  3   STD testing  was not done   4  Gardasil recommendations reviewed    5  The following were reviewed in today's visit: breast self exam, family planning choices, exercise and healthy diet  6  IUD in place - [place] - due for removal and replacement in March   Information sheet for pre certification given     CC:  Annual Gynecologic Examination    HPI: Chelsea Garrison is a 32 y o  Manuela Pion who presents for annual gynecologic examination  She has the following concerns:  IUD needs to be removed and replaced      Health Maintenance:    She wears her seatbelt routinely  She does perform regular monthly self breast exams  She feels safe at home  Past Medical History:   Diagnosis Date    Migraine     Placenta previa antepartum in first trimester     Varicella     As a child    Clarine Shira disease ([facility] [address] )        Past Surgical History:   Procedure Laterality Date    ADENOIDECTOMY      CHOLECYSTECTOMY      2017    GALLBLADDER SURGERY      removal- resolved    SD  DELIVERY ONLY N/A 2018    Procedure:  SECTION (); Surgeon: Maki Rhodes MD;  Location: [facility] ;  Service: Obstetrics    TONSILLECTOMY      in        OB/Gyn History:    Pt does not have menstrual issues  =       Patient is currently sexually active  The current method of family planning is IUD      OB History        1    Para   1    Term   1       0    AB   0    Living   1       SAB   0    IAB   0    Ectopic   0    Multiple   0    Live Births   1                 Family History   Problem Relation Age of Onset    Diabetes Paternal Grandmother     Hypertension Paternal Grandmother     Diabetes Paternal Grandfather     No Known Problems Mother     No Known Problems Father     No Known Problems Sister     No Known Problems Brother        Social History:  Social History     Socioeconomic History    Marital status: Single     Spouse name: Not on file    Number of children: Not on file    Years of education: Not on file    Highest education level: Not on file   Occupational History    Not on file   Tobacco Use    Smoking status: Never Smoker    Smokeless tobacco: Never Used   Vaping Use    Vaping Use: Never used   Substance and Sexual Activity    Alcohol use: No    Drug use: No    Sexual activity: Yes   Other Topics Concern    Not on file   Social History Narrative    Not on file     Social Determinants of Health     Financial Resource Strain: Not on file   Food Insecurity: Not on file   Transportation Needs: Not on file   Physical Activity: Not on file   Stress: Not on file   Social Connections: Not on file   Intimate Partner Violence: Not on file   Housing Stability: Not on file         Allergies   Allergen Reactions    Aspirin      Von-willebrand disorder          Current Outpatient Medications:     Insulin Pen Needle (Novofine Pen Needle) 32G X 6 MM MISC, Use daily (Patient not taking: Reported on 2/3/2022 ), Disp: 90 each, Rfl: 1    liraglutide (SAXENDA) injection, Inject 3mg subcutaneously once per day  (Patient not taking: Reported on 2/3/2022 ), Disp: 45 mL, Rfl: 0    liraglutide (SAXENDA) injection, Inject 3mg subcutaneously once per day  (Patient not taking: Reported on 2/3/2022 ), Disp: 45 mL, Rfl: 0    Review of Systems:  Constitutional :no fever, feels well, no tiredness, no recent weight gain or loss  ENT: no ear ache, no loss of hearing, no nosebleeds or nasal discharge, no sore throat or hoarseness  Cardiovascular: no complaints of slow or fast heart beat, no chest pain, no palpitations, no leg claudication or lower extremity edema    Respiratory: no complaints of shortness of shortness of breath, no GANNON  Breasts:no complaints of breast pain, breast lump, or nipple discharge  Gastrointestinal: no complaints of abdominal pain, constipation, nausea, vomiting, or diarrhea or bloody stools  Genitourinary : no complaints of dysuria, incontinence, pelvic pain, no dysmenorrhea, vaginal discharge or abnormal vaginal bleeding and as noted in HPI  Musculoskeletal: no complaints of arthralgia, no myalgia, no joint swelling or stiffness, no limb pain or swelling  Integumentary: no complaints of skin rash or lesion, itching or dry skin  Neurological: no complaints of headache, no confusion, no numbness or tingling, no dizziness or fainting    Objective      /70   Wt 70 3 kg (155 lb)   LMP 01/15/2022 (Exact Date)   BMI 31 31 kg/m²     General:   appears stated age, cooperative, alert normal mood and affect   Lungs: Unlabored breathing    Breasts: normal appearance, no masses or tenderness   Abdomen: soft, non-tender, without masses or organomegaly   Vulva: normal   Vagina: normal vagina, no discharge, exudate, lesion, or erythema   Urethra: normal   Cervix: Normal, no discharge  IUD strings present     Uterus: normal size, contour, position, consistency, mobility, non-tender   Adnexa: no mass, fullness, tenderness   Psychiatric orientation to person, place, and time: normal  mood and affect: normal

## 2022-02-25 DIAGNOSIS — Z79.899 MEDICATION MANAGEMENT: Primary | ICD-10-CM

## 2022-03-01 DIAGNOSIS — E66.9 OBESITY, CLASS I, BMI 30-34.9: Primary | ICD-10-CM

## 2022-03-01 RX ORDER — PHENTERMINE HYDROCHLORIDE 15 MG/1
15 CAPSULE ORAL EVERY MORNING
Qty: 30 CAPSULE | Refills: 1 | Status: SHIPPED | OUTPATIENT
Start: 2022-03-01 | End: 2022-04-11 | Stop reason: DRUGHIGH

## 2022-03-09 ENCOUNTER — PROCEDURE VISIT (OUTPATIENT)
Dept: OBGYN CLINIC | Facility: MEDICAL CENTER | Age: 27
End: 2022-03-09
Payer: COMMERCIAL

## 2022-03-09 ENCOUNTER — OFFICE VISIT (OUTPATIENT)
Dept: FAMILY MEDICINE CLINIC | Facility: CLINIC | Age: 27
End: 2022-03-09
Payer: COMMERCIAL

## 2022-03-09 VITALS
RESPIRATION RATE: 12 BRPM | SYSTOLIC BLOOD PRESSURE: 100 MMHG | OXYGEN SATURATION: 99 % | HEART RATE: 75 BPM | HEIGHT: 60 IN | BODY MASS INDEX: 31.61 KG/M2 | WEIGHT: 161 LBS | DIASTOLIC BLOOD PRESSURE: 70 MMHG

## 2022-03-09 VITALS — WEIGHT: 161 LBS | BODY MASS INDEX: 31.97 KG/M2 | DIASTOLIC BLOOD PRESSURE: 70 MMHG | SYSTOLIC BLOOD PRESSURE: 130 MMHG

## 2022-03-09 DIAGNOSIS — D68.0 VON WILLEBRAND DISEASE (HCC): ICD-10-CM

## 2022-03-09 DIAGNOSIS — Z97.5 IUD (INTRAUTERINE DEVICE) IN PLACE: ICD-10-CM

## 2022-03-09 DIAGNOSIS — Z76.89 ENCOUNTER TO ESTABLISH CARE WITH NEW DOCTOR: ICD-10-CM

## 2022-03-09 DIAGNOSIS — Z30.433 ENCOUNTER FOR IUD REMOVAL AND REINSERTION: Primary | ICD-10-CM

## 2022-03-09 DIAGNOSIS — E66.9 OBESITY, CLASS I, BMI 30-34.9: ICD-10-CM

## 2022-03-09 DIAGNOSIS — Z00.00 ANNUAL PHYSICAL EXAM: Primary | ICD-10-CM

## 2022-03-09 PROCEDURE — 58301 REMOVE INTRAUTERINE DEVICE: CPT | Performed by: OBSTETRICS & GYNECOLOGY

## 2022-03-09 PROCEDURE — 3725F SCREEN DEPRESSION PERFORMED: CPT | Performed by: INTERNAL MEDICINE

## 2022-03-09 PROCEDURE — 58300 INSERT INTRAUTERINE DEVICE: CPT | Performed by: OBSTETRICS & GYNECOLOGY

## 2022-03-09 PROCEDURE — 3008F BODY MASS INDEX DOCD: CPT | Performed by: NURSE PRACTITIONER

## 2022-03-09 PROCEDURE — 99395 PREV VISIT EST AGE 18-39: CPT | Performed by: INTERNAL MEDICINE

## 2022-03-09 NOTE — PROGRESS NOTES
Iud removal    Date/Time: 3/9/2022 6:43 PM  Performed by: Nazario Brizuela MD  Authorized by: Nazario Brizuela MD   Universal Protocol:  Consent: Verbal consent obtained  Written consent obtained  Risks and benefits: risks, benefits and alternatives were discussed  Consent given by: patient  Time out: Immediately prior to procedure a "time out" was called to verify the correct patient, procedure, equipment, support staff and site/side marked as required  Patient understanding: patient states understanding of the procedure being performed  Patient consent: the patient's understanding of the procedure matches consent given  Procedure consent: procedure consent matches procedure scheduled  Patient identity confirmed: verbally with patient      Procedure:     Removed with no complications: yes    Iud insertions    Date/Time: 3/9/2022 6:44 PM  Performed by: Nazario Brizuela MD  Authorized by: Nazario Brizuela MD   Universal Protocol:  Consent: Verbal consent obtained  Written consent obtained  Risks and benefits: risks, benefits and alternatives were discussed  Consent given by: patient  Time out: Immediately prior to procedure a "time out" was called to verify the correct patient, procedure, equipment, support staff and site/side marked as required  Patient understanding: patient states understanding of the procedure being performed  Patient consent: the patient's understanding of the procedure matches consent given  Procedure consent: procedure consent matches procedure scheduled  Patient identity confirmed: verbally with patient        Procedure:     Pelvic exam performed: yes      Cervix cleaned and prepped: yes      Speculum placed in vagina: yes      Tenaculum applied to cervix: yes      Uterus sounded: yes      Uterus sound depth (cm):  9    IUD inserted with no complications: yes      IUD type: Romelia Manus      Strings trimmed: yes    Post-procedure:     Patient tolerated procedure well: yes      Patient will follow up after next period: yes

## 2022-03-10 NOTE — PROGRESS NOTES
Assessment/Plan:    No problem-specific Assessment & Plan notes found for this encounter  Diagnoses and all orders for this visit:    Annual physical exam    Encounter to establish care with new doctor    Obesity, Class I, BMI 30-34 9  -     Comprehensive metabolic panel; Future  -     TSH, 3rd generation with Free T4 reflex; Future  -     CBC and differential; Future  -     Vitamin D 25 hydroxy; Future  -     UA (URINE) with reflex to Scope    Von Willebrand disease ([facility] [address] )          Subjective:      Patient ID: Liam Khanna is a 32 y o  female  Patient came today for annual checkup  She does not have any significant past medical history except of Von Willebrand disease which seems to be very well controlled right now  She also has history of obesity but she is actively losing weight exercising regularly and following up with weight management  She is currently on phentermine  No family history of breast cancer colon cancer  She is up-to-date on her tetanus shot  The following portions of the patient's history were reviewed and updated as appropriate: allergies, current medications, past family history, past medical history, past social history, past surgical history, and problem list     Review of Systems   Constitutional: Negative for activity change, appetite change, chills, fatigue and fever  HENT: Negative for congestion, ear pain, rhinorrhea and sore throat  Respiratory: Negative for cough, shortness of breath and wheezing  Cardiovascular: Negative for chest pain, palpitations and leg swelling  Gastrointestinal: Negative for abdominal distention, abdominal pain, diarrhea, nausea and vomiting  Genitourinary: Negative for difficulty urinating, frequency and pelvic pain  Musculoskeletal: Negative for arthralgias, back pain and neck pain  Skin: Negative for rash  Neurological: Negative for dizziness, tremors, weakness, numbness and headaches           Objective:      /70 (BP Location: Left arm, Patient Position: Sitting, Cuff Size: Standard)   Pulse 75   Resp 12   Ht 4' 11 5" (1 511 m)   Wt 73 kg (161 lb)   LMP 02/09/2022   SpO2 99%   BMI 31 97 kg/m²     Allergies   Allergen Reactions    Aspirin      Von-willebrand disorder           Current Outpatient Medications:     phentermine 15 MG capsule, Take 1 capsule (15 mg total) by mouth every morning, Disp: 30 capsule, Rfl: 1    Insulin Pen Needle (Novofine Pen Needle) 32G X 6 MM MISC, Use daily (Patient not taking: Reported on 2/3/2022 ), Disp: 90 each, Rfl: 1  No current facility-administered medications for this visit  There are no Patient Instructions on file for this visit  Physical Exam  Vitals and nursing note reviewed  Constitutional:       General: She is not in acute distress  Appearance: She is well-developed  She is not diaphoretic  HENT:      Head: Normocephalic and atraumatic  Eyes:      General: No scleral icterus  Conjunctiva/sclera: Conjunctivae normal    Neck:      Thyroid: No thyromegaly  Cardiovascular:      Rate and Rhythm: Normal rate and regular rhythm  Heart sounds: Normal heart sounds  No murmur heard  No friction rub  No gallop  Pulmonary:      Effort: Pulmonary effort is normal  No respiratory distress  Breath sounds: Normal breath sounds  No stridor  No wheezing or rales  Abdominal:      General: Bowel sounds are normal  There is no distension  Palpations: Abdomen is soft  There is no mass  Tenderness: There is no abdominal tenderness  There is no guarding or rebound  Musculoskeletal:      Cervical back: Normal range of motion and neck supple  Lymphadenopathy:      Cervical: No cervical adenopathy  Neurological:      Mental Status: She is alert and oriented to person, place, and time  Psychiatric:         Behavior: Behavior normal          Thought Content:  Thought content normal          Judgment: Judgment normal

## 2022-03-18 ENCOUNTER — AMB VIDEO VISIT (OUTPATIENT)
Dept: OTHER | Facility: HOSPITAL | Age: 27
End: 2022-03-18
Payer: COMMERCIAL

## 2022-03-18 DIAGNOSIS — J20.8 ACUTE BRONCHITIS, BACTERIAL: Primary | ICD-10-CM

## 2022-03-18 DIAGNOSIS — B96.89 ACUTE BRONCHITIS, BACTERIAL: Primary | ICD-10-CM

## 2022-03-18 PROCEDURE — 99212 OFFICE O/P EST SF 10 MIN: CPT | Performed by: NURSE PRACTITIONER

## 2022-03-18 PROCEDURE — 1036F TOBACCO NON-USER: CPT | Performed by: NURSE PRACTITIONER

## 2022-03-18 RX ORDER — AZITHROMYCIN 250 MG/1
TABLET, FILM COATED ORAL
Qty: 6 TABLET | Refills: 0 | Status: SHIPPED | OUTPATIENT
Start: 2022-03-18 | End: 2022-03-22

## 2022-03-18 RX ORDER — ALBUTEROL SULFATE 90 UG/1
2 AEROSOL, METERED RESPIRATORY (INHALATION) EVERY 6 HOURS PRN
Qty: 16 G | Refills: 0 | Status: SHIPPED | OUTPATIENT
Start: 2022-03-18 | End: 2022-03-25

## 2022-03-18 NOTE — PROGRESS NOTES
Video Visit - Candance Morelle 32 y o  female MRN: [id number]    REQUIRED DOCUMENTATION:         1  This service was provided via [facility]  2  Provider located at [address] [place] [id number]  3  [facility] provider: HARLEEN Nye  4  Identify all parties in room with patient during [facility] visit:  Patient   5  After connecting through [employer], patient was identified by name and date of birth  Patient was then informed that this was a Telemedicine visit and that the exam was being conducted confidentially over secure lines  My office door was closed  No one else was in the room  Patient acknowledged consent and understanding of privacy and security of the Telemedicine visit  I informed the patient that I have reviewed their record in Epic and presented the opportunity for them to ask any questions regarding the visit today  The patient agreed to participate  This is a 32year old female here today for video visit  She states she started with cough, congestion and runny nose last week  She is haivng some sinus pressure  She is having increased cough  She has history of seasonal allergies  She is having some chest tightness in AM   She has noticed some wheezing  Cough is worse symptoms  No chance of pregnancy  She had covid 16 vaccine  She denies any exposure to covid  She is eating and drinking okay  At home covid test negative  Review of Systems   Constitutional: Negative for activity change, chills and fatigue  HENT: Positive for congestion and postnasal drip  Respiratory: Positive for cough, shortness of breath and wheezing  Cardiovascular: Negative for chest pain  Gastrointestinal: Negative  Musculoskeletal: Negative  Skin: Negative  Neurological: Negative  Psychiatric/Behavioral: Negative  Physical Exam  Constitutional:       General: She is not in acute distress  Appearance: Normal appearance   She is not ill-appearing or toxic-appearing  HENT:      Head: Normocephalic and atraumatic  Eyes:      Conjunctiva/sclera: Conjunctivae normal    Pulmonary:      Effort: Pulmonary effort is normal  No respiratory distress  Comments: No cough or audible wheeze on exam  Patient able to speak in full sentences  Skin:     General: Skin is warm and dry  Neurological:      Mental Status: She is alert and oriented to person, place, and time  Psychiatric:         Mood and Affect: Mood normal          Behavior: Behavior normal          Thought Content: Thought content normal          Judgment: Judgment normal        Diagnoses and all orders for this visit:    Acute bronchitis, bacterial  -     azithromycin (ZITHROMAX) 250 mg tablet; 2 tablets on day 1, 1 tablet day 2-5  -     albuterol (ProAir HFA) 90 mcg/act inhaler; Inhale 2 puffs every 6 (six) hours as needed for wheezing for up to 7 days      Patient Instructions     Rest and drink extra fluids  Start antibiotic  Take probiotic  Inhaler as prescribed  Cool mist humidification can be helpful  Follow up with PCP if no improvement  Go to ER with worsening symptoms  Acute Bronchitis   AMBULATORY CARE:   Acute bronchitis  is swelling and irritation in the air passages of your lungs  This irritation may cause you to cough or have other breathing problems  Acute bronchitis often starts because of another illness, such as a cold or the flu  The illness spreads from your nose and throat to your windpipe and airways  Bronchitis is often called a chest cold  Acute bronchitis lasts about 3 to 6 weeks and is usually not a serious illness  Your cough can last for several weeks     You may have any of the following symptoms:   · A cough with sputum that may be clear, yellow, or green    · Feeling more tired than usual, and body aches    · A fever and chills    · Wheezing when you breathe    · A tight chest or pain when you breathe or cough  Seek care immediately if:   · You cough up blood  · Your lips or fingernails turn blue  · You feel like you are not getting enough air when you breathe  Contact your healthcare provider if:   · You have a fever  · Your breathing problems do not go away or get worse  · Your cough does not get better within 4 weeks  · You have questions or concerns about your condition or care  Self-care:   · Get more rest   Rest helps your body to heal  Slowly start to do more each day  Rest when you feel it is needed  · Avoid irritants in the air  Avoid chemicals, fumes, and dust  Wear a face mask if you must work around dust or fumes  Stay inside on days when air pollution levels are high  If you have allergies, stay inside when pollen counts are high  Do not use aerosol products, such as spray-on deodorant, bug spray, and hair spray  · Do not smoke or be around others who smoke  Nicotine and other chemicals in cigarettes and cigars damages the cilia that move mucus out of your lungs  Ask your healthcare provider for information if you currently smoke and need help to quit  E-cigarettes or smokeless tobacco still contain nicotine  Talk to your healthcare provider before you use these products  · Drink liquids as directed  Liquids help keep your air passages moist and help you cough up mucus  You may need to drink more liquids when you have acute bronchitis  Ask how much liquid to drink each day and which liquids are best for you  · Use a humidifier or vaporizer  Use a cool mist humidifier or a vaporizer to increase air moisture in your home  This may make it easier for you to breathe and help decrease your cough  Prevent acute bronchitis by doing the following:   · Get the vaccinations you need  Ask your healthcare provider if you should get vaccinated against the flu or pneumonia  · Prevent the spread of germs    You can decrease your risk of acute bronchitis and other illnesses by doing the following:     [facility] AUTHORITY your hands often with soap and water  Carry germ-killing hand lotion or gel with you  You can use the lotion or gel to clean your hands when soap and water are not available  ¨ Do not touch your eyes, nose, or mouth unless you have washed your hands first     ¨ Always cover your mouth when you cough to prevent the spread of germs  It is best to cough into a tissue or your shirt sleeve instead of into your hand  Ask those around you cover their mouths when they cough  ¨ Try to avoid people who have a cold or the flu  If you are sick, stay away from others as much as possible  Medicines: Your healthcare provider may  give you any of the following:  · Ibuprofen or acetaminophen  are medicines that help lower your fever  They are available without a doctor's order  Ask your healthcare provider which medicine is right for you  Ask how much to take and how often to take it  Follow directions  These medicines can cause stomach bleeding if not taken correctly  Ibuprofen can cause kidney damage  Do not take ibuprofen if you have kidney disease, an ulcer, or allergies to aspirin  Acetaminophen can cause liver damage  Do not take more than 4,000 milligrams in 24 hours  · Decongestants  help loosen mucus in your lungs and make it easier to cough up  This can help you breathe easier  · Cough suppressants  decrease your urge to cough  If your cough produces mucus, do not take a cough suppressant unless your healthcare provider tells you to  Your healthcare provider may suggest that you take a cough suppressant at night so you can rest     · Inhalers  may be given  Your healthcare provider may give you one or more inhalers to help you breathe easier and cough less  An inhaler gives your medicine to open your airways  Ask your healthcare provider to show you how to use your inhaler correctly         Follow up with your healthcare provider as directed:  Write down questions you have so you will remember to ask them during your follow-up visits  © 2017 [address] [employer] Information is for End User's use only and may not be sold, redistributed or otherwise used for commercial purposes  All illustrations and images included in CareNotes® are the copyrighted property of [employer] A M [employer] Inc  or Shin Mann  The above information is an  only  It is not intended as medical advice for individual conditions or treatments  Talk to your doctor, nurse or pharmacist before following any medical regimen to see if it is safe and effective for you  Follow up with PCP if not improved, if symptoms are worse, go to the ER

## 2022-03-18 NOTE — PATIENT INSTRUCTIONS
Rest and drink extra fluids  Start antibiotic  Take probiotic  Inhaler as prescribed  Cool mist humidification can be helpful  Follow up with PCP if no improvement  Go to ER with worsening symptoms  Acute Bronchitis   AMBULATORY CARE:   Acute bronchitis  is swelling and irritation in the air passages of your lungs  This irritation may cause you to cough or have other breathing problems  Acute bronchitis often starts because of another illness, such as a cold or the flu  The illness spreads from your nose and throat to your windpipe and airways  Bronchitis is often called a chest cold  Acute bronchitis lasts about 3 to 6 weeks and is usually not a serious illness  Your cough can last for several weeks  You may have any of the following symptoms:   · A cough with sputum that may be clear, yellow, or green    · Feeling more tired than usual, and body aches    · A fever and chills    · Wheezing when you breathe    · A tight chest or pain when you breathe or cough  Seek care immediately if:   · You cough up blood  · Your lips or fingernails turn blue  · You feel like you are not getting enough air when you breathe  Contact your healthcare provider if:   · You have a fever  · Your breathing problems do not go away or get worse  · Your cough does not get better within 4 weeks  · You have questions or concerns about your condition or care  Self-care:   · Get more rest   Rest helps your body to heal  Slowly start to do more each day  Rest when you feel it is needed  · Avoid irritants in the air  Avoid chemicals, fumes, and dust  Wear a face mask if you must work around dust or fumes  Stay inside on days when air pollution levels are high  If you have allergies, stay inside when pollen counts are high  Do not use aerosol products, such as spray-on deodorant, bug spray, and hair spray  · Do not smoke or be around others who smoke    Nicotine and other chemicals in cigarettes and cigars damages the cilia that move mucus out of your lungs  Ask your healthcare provider for information if you currently smoke and need help to quit  E-cigarettes or smokeless tobacco still contain nicotine  Talk to your healthcare provider before you use these products  · Drink liquids as directed  Liquids help keep your air passages moist and help you cough up mucus  You may need to drink more liquids when you have acute bronchitis  Ask how much liquid to drink each day and which liquids are best for you  · Use a humidifier or vaporizer  Use a cool mist humidifier or a vaporizer to increase air moisture in your home  This may make it easier for you to breathe and help decrease your cough  Prevent acute bronchitis by doing the following:   · Get the vaccinations you need  Ask your healthcare provider if you should get vaccinated against the flu or pneumonia  · Prevent the spread of germs  You can decrease your risk of acute bronchitis and other illnesses by doing the following:     [facility] AUTHORITY your hands often with soap and water  Carry germ-killing hand lotion or gel with you  You can use the lotion or gel to clean your hands when soap and water are not available  ¨ Do not touch your eyes, nose, or mouth unless you have washed your hands first     ¨ Always cover your mouth when you cough to prevent the spread of germs  It is best to cough into a tissue or your shirt sleeve instead of into your hand  Ask those around you cover their mouths when they cough  ¨ Try to avoid people who have a cold or the flu  If you are sick, stay away from others as much as possible  Medicines: Your healthcare provider may  give you any of the following:  · Ibuprofen or acetaminophen  are medicines that help lower your fever  They are available without a doctor's order  Ask your healthcare provider which medicine is right for you  Ask how much to take and how often to take it  Follow directions   These medicines can cause stomach bleeding if not taken correctly  Ibuprofen can cause kidney damage  Do not take ibuprofen if you have kidney disease, an ulcer, or allergies to aspirin  Acetaminophen can cause liver damage  Do not take more than 4,000 milligrams in 24 hours  · Decongestants  help loosen mucus in your lungs and make it easier to cough up  This can help you breathe easier  · Cough suppressants  decrease your urge to cough  If your cough produces mucus, do not take a cough suppressant unless your healthcare provider tells you to  Your healthcare provider may suggest that you take a cough suppressant at night so you can rest     · Inhalers  may be given  Your healthcare provider may give you one or more inhalers to help you breathe easier and cough less  An inhaler gives your medicine to open your airways  Ask your healthcare provider to show you how to use your inhaler correctly  Follow up with your healthcare provider as directed:  Write down questions you have so you will remember to ask them during your follow-up visits  © 2017 [address] [employer]  Information is for End User's use only and may not be sold, redistributed or otherwise used for commercial purposes  All illustrations and images included in CareNotes® are the copyrighted property of [employer] Inc  or Shin Mann  The above information is an  only  It is not intended as medical advice for individual conditions or treatments  Talk to your doctor, nurse or pharmacist before following any medical regimen to see if it is safe and effective for you

## 2022-03-18 NOTE — CARE ANYWHERE EVISITS
Visit Summary for CHRISTIN JEREZ - Gender: Female - Date of Birth: [id number]  Date: [id number] - Duration: 6 minutes  Patient: Verna JEREZ  Provider: [id number]    Patient Contact Information  Address  [place]   [place]; [address]  [phone]    Visit Topics  runny nose,congestion, post nasal drip , cough [Added By: Self - 2022-03-18]  Cold [Added By: Self - 2022-03-18]    Triage Questions   What is your current physical address in the event of a medical emergency? Answer []  Are you allergic to any medications? Answer []  Are you now or could you be pregnant? Answer []  Do you have any immune system compromise or chronic lung   disease? Answer []  Do you have any vulnerable family members in the home (infant, pregnant, cancer, elderly)? Answer []     Conversation Transcripts  [0A][0A] [Notification] You are connected with Judy Asher, Urgent Care Specialist [0A][Notification] Ishaan Valles is located in [place]  [0A][Notification] Ishaan Valles has shared health history  Lee Daniels  [0A]    Diagnosis  Acute bronchitis    Procedures  Value: [id number] Code: CPT-4 UNLISTED E&M SERVICE    Medications Prescribed    No prescriptions ordered    Electronically signed by: Judy Hale(NPI [id number])

## 2022-04-11 ENCOUNTER — OFFICE VISIT (OUTPATIENT)
Dept: BARIATRICS | Facility: CLINIC | Age: 27
End: 2022-04-11
Payer: COMMERCIAL

## 2022-04-11 VITALS
WEIGHT: 161.7 LBS | HEIGHT: 60 IN | TEMPERATURE: 98.7 F | HEART RATE: 97 BPM | BODY MASS INDEX: 31.75 KG/M2 | SYSTOLIC BLOOD PRESSURE: 130 MMHG | DIASTOLIC BLOOD PRESSURE: 66 MMHG

## 2022-04-11 DIAGNOSIS — E66.9 OBESITY, CLASS I, BMI 30-34.9: Primary | ICD-10-CM

## 2022-04-11 PROCEDURE — 1036F TOBACCO NON-USER: CPT | Performed by: PHYSICIAN ASSISTANT

## 2022-04-11 PROCEDURE — 3008F BODY MASS INDEX DOCD: CPT | Performed by: PHYSICIAN ASSISTANT

## 2022-04-11 PROCEDURE — 99214 OFFICE O/P EST MOD 30 MIN: CPT | Performed by: PHYSICIAN ASSISTANT

## 2022-04-11 RX ORDER — PHENTERMINE HYDROCHLORIDE 37.5 MG/1
37.5 TABLET ORAL DAILY
Qty: 30 TABLET | Refills: 2 | Status: SHIPPED | OUTPATIENT
Start: 2022-04-11 | End: 2022-07-27 | Stop reason: SDUPTHER

## 2022-04-11 NOTE — PROGRESS NOTES
Assessment/Plan:    Obesity, Class I, BMI 30-34 9  -Patient is pursuing Conservative Program + RD bundle  -Initial weight loss goal of 5-10% weight loss for improved health  -on previously and did well  Not covered by insurance   -phentermine started 2/3/22 at 161 8 lbs      Initial: 181 7 lbs  Current: 161 7 lbs  Change: -20 lbs   Goal: 140 lbs     Goals:  Food log (ie ) www myfitnesspal com,sparkpeople  com,loseit com,calorieking  com,etc  baritastic  No sugary beverages  At least 64oz of water daily  Increase physical activity by 10 minutes daily  Gradually increase physical activity to a goal of 5 days per week for 30 minutes of MODERATE intensity PLUS 2 days per week of FULL BODY resistance training  Nutrition Prescription  Calories: 850-1350  Protein: 67-99 gm  Keep up great work with exercise and water lbs   Increase phentermine to 37 5 mg once a day        Follow up in approximately 2 months with Non-Surgical Physician/Advanced Practitioner  Diagnoses and all orders for this visit:    Obesity, Class I, BMI 30-34 9  -     phentermine (ADIPEX-P) 37 5 MG tablet; Take 1 tablet (37 5 mg total) by mouth daily          Subjective:   Chief Complaint   Patient presents with    Follow-up     10 wk fu        Patient ID: Junior Husbands  is a 32 y o  female with excess weight/obesity here to pursue weight managment    Patient is pursuing Conservative Program      HPI    Food logging: writing it down-not counting calories- weighing   Exercise: 2 hot yoga per week and gym 2-3 gym for 1 hour   Hydration: 6 bottles of water, 1 cup of coffee with almond creamer-1-2 times per week   Taking phentermine 15 mg- not helping appetite -no negative side effects     B: premieum protein  S: nuts  L: meat and veggies  S: n/a  D: meat and veggie or protein shake   S: n/a    The following portions of the patient's history were reviewed and updated as appropriate: allergies, current medications, past family history, past medical history, past social history, past surgical history and problem list     Review of Systems   HENT: Negative for sore throat  Respiratory: Negative for cough and shortness of breath  Cardiovascular: Negative for chest pain and palpitations  Gastrointestinal: Negative for abdominal pain, constipation, diarrhea, nausea and vomiting         + GERD-diet controlled    Skin: Negative for rash  Psychiatric/Behavioral: Negative for suicidal ideas (denies HI)  Denies depression and anxiety       Objective:    /66 (BP Location: Left arm, Patient Position: Sitting, Cuff Size: Standard)   Pulse 97   Temp 98 7 °F (37 1 °C) (Tympanic)   Ht 4' 11 5" (1 511 m)   Wt 73 3 kg (161 lb 11 2 oz)   BMI 32 11 kg/m²      Physical Exam  Vitals and nursing note reviewed  Constitutional   General appearance: Abnormal   well developed and obese  Eyes No conjunctival injection   Pulmonary   Respiratory effort: No increased work of breathing or signs of respiratory distress  Abdomen   Abdomen: Abnormal   The abdomen was obese  Musculoskeletal   Gait and station: Normal     Skin  Dry   No visible rashes   Psychiatric   Orientation to person, place and time: Normal     Affect: appropriate  Neurological   Normal gait

## 2022-04-11 NOTE — ASSESSMENT & PLAN NOTE
-Patient is pursuing Conservative Program + RD bundle  -Initial weight loss goal of 5-10% weight loss for improved health  -on previously and did well  Not covered by insurance   -phentermine started 2/3/22 at 161 8 lbs      Initial: 181 7 lbs  Current: 161 7 lbs  Change: -20 lbs   Goal: 140 lbs     Goals:  Food log (ie ) www myfitnesspal com,sparkpeople  com,loseit com,calorieking  com,etc  baritastic  No sugary beverages  At least 64oz of water daily  Increase physical activity by 10 minutes daily  Gradually increase physical activity to a goal of 5 days per week for 30 minutes of MODERATE intensity PLUS 2 days per week of FULL BODY resistance training    Nutrition Prescription  Calories: 850-1350  Protein: 67-99 gm  Keep up great work with exercise and water lbs   Increase phentermine to 37 5 mg once a day

## 2022-05-03 ENCOUNTER — OFFICE VISIT (OUTPATIENT)
Dept: OBGYN CLINIC | Facility: MEDICAL CENTER | Age: 27
End: 2022-05-03
Payer: COMMERCIAL

## 2022-05-03 VITALS
SYSTOLIC BLOOD PRESSURE: 110 MMHG | HEIGHT: 59 IN | BODY MASS INDEX: 32.21 KG/M2 | WEIGHT: 159.8 LBS | DIASTOLIC BLOOD PRESSURE: 60 MMHG

## 2022-05-03 DIAGNOSIS — Z97.5 IUD (INTRAUTERINE DEVICE) IN PLACE: Primary | ICD-10-CM

## 2022-05-03 PROCEDURE — 3008F BODY MASS INDEX DOCD: CPT | Performed by: OBSTETRICS & GYNECOLOGY

## 2022-05-03 PROCEDURE — 1036F TOBACCO NON-USER: CPT | Performed by: OBSTETRICS & GYNECOLOGY

## 2022-05-03 PROCEDURE — 99212 OFFICE O/P EST SF 10 MIN: CPT | Performed by: OBSTETRICS & GYNECOLOGY

## 2022-05-03 RX ORDER — LEVONORGESTREL 19.5 MG/1
1 INTRAUTERINE DEVICE INTRAUTERINE ONCE
COMMUNITY
Start: 2022-03-09

## 2022-05-03 NOTE — PROGRESS NOTES
Assessment Niko Myers was seen today for follow-up  Diagnoses and all orders for this visit:    IUD (intrauterine device) in place         Guillermo Melendez 58 in place   Aware placement for 5 years   Possible side effects reviewed  Will see patient for yearly visit    Jorge Menchaca is a 32 y o  female here for aIUD check up  States happy with iud  Denies pelvic pain  Does have her cycle currently      Patient Active Problem List   Diagnosis    Von Willebrand disease ([facility] [address] )    S/P  section    Obesity, Class I, BMI 30-34 9    Weight gain    IUD (intrauterine device) in place       Gynecologic History  Patient's last menstrual period was 2022  The current method of family planning is IUD  Past Medical History:   Diagnosis Date    Migraine     Placenta previa antepartum in first trimester     Varicella     As a child    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[address] )      Past Surgical History:   Procedure Laterality Date    ADENOIDECTOMY      CHOLECYSTECTOMY      2017    GALLBLADDER SURGERY      removal- resolved    UT  DELIVERY ONLY N/A 2018    Procedure:  SECTION ();   Surgeon: Leticia Sarah MD;  Location: [facility] ;  Service: Obstetrics    TONSILLECTOMY      in      Family History   Problem Relation Age of Onset    Diabetes Paternal Grandmother     Hypertension Paternal Grandmother     Diabetes Paternal Grandfather     No Known Problems Mother     No Known Problems Father     No Known Problems Sister     No Known Problems Brother      Social History     Socioeconomic History    Marital status: Single     Spouse name: Not on file    Number of children: Not on file    Years of education: Not on file    Highest education level: Not on file   Occupational History    Not on file   Tobacco Use    Smoking status: Never Smoker    Smokeless tobacco: Never Used   Vaping Use    Vaping Use: Never used   Substance and Sexual Activity    Alcohol use: No    Drug use: No    Sexual activity: Yes   Other Topics Concern    Not on file   Social History Narrative    Not on file     Social Determinants of Health     Financial Resource Strain: Not on file   Food Insecurity: Not on file   Transportation Needs: Not on file   Physical Activity: Not on file   Stress: Not on file   Social Connections: Not on file   Intimate Partner Violence: Not on file   Housing Stability: Not on file     Allergies   Allergen Reactions    Aspirin      Von-willebrand disorder        Current Outpatient Medications:     levonorgestrel (Kyleena) 19 5 MG intrauterine device, 1 each by Intrauterine route once, Disp: , Rfl:     phentermine (ADIPEX-P) 37 5 MG tablet, Take 1 tablet (37 5 mg total) by mouth daily, Disp: 30 tablet, Rfl: 2    Review of Systems  Constitutional :no fever, feels well, no tiredness, no recent weight gain or loss  ENT: no ear ache, no loss of hearing, no nosebleeds or nasal discharge, no sore throat or hoarseness  Cardiovascular: no complaints of slow or fast heart beat, no chest pain, no palpitations, no leg claudication or lower extremity edema  Respiratory: no complaints of shortness of shortness of breath, no GANNON  Breasts:no complaints of breast pain, breast lump, or nipple discharge  Gastrointestinal: no complaints of abdominal pain, constipation, nausea, vomiting, or diarrhea or bloody stools  Genitourinary : no complaints of dysuria, incontinence, pelvic pain, no dysmenorrhea, vaginal discharge or abnormal vaginal bleeding and as noted in HPI  Musculoskeletal: no complaints of arthralgia, no myalgia, no joint swelling or stiffness, no limb pain or swelling    Integumentary: no complaints of skin rash or lesion, itching or dry skin  Neurological: no complaints of headache, no confusion, no numbness or tingling, no dizziness or fainting     Objective     /60   Ht 4' 11" (1 499 m)   Wt 72 5 kg (159 lb 12 8 oz)   LMP 04/30/2022   Breastfeeding No   BMI 32 28 kg/m² General:   appears stated age, cooperative, alert normal mood and affect   Abdomen: soft, non-tender, without masses or organomegaly   Vulva: normal   Vagina: normal vagina, no discharge, exudate, lesion, or erythema   Urethra: normal   Cervix: Normal, no discharge  IUD strings present  Uterus: normal size, contour, position, consistency, mobility, non-tender   Adnexa: no mass, fullness, tenderness   Lymphatic palpation of lymph nodes in neck, axilla, groin and/or other locations: no lymphadenopathy or masses noted   Skin normal skin turgor and no rashes     Psychiatric orientation to person, place, and time: normal  mood and affect: normal

## 2022-06-30 ENCOUNTER — CLINICAL SUPPORT (OUTPATIENT)
Dept: FAMILY MEDICINE CLINIC | Facility: CLINIC | Age: 27
End: 2022-06-30
Payer: COMMERCIAL

## 2022-06-30 DIAGNOSIS — Z03.818 ENCOUNTER FOR OBSERVATION FOR SUSPECTED EXPOSURE TO OTHER BIOLOGICAL AGENTS RULED OUT: Primary | ICD-10-CM

## 2022-06-30 LAB
SARS-COV-2 AG UPPER RESP QL IA: NEGATIVE
VALID CONTROL: NORMAL

## 2022-06-30 PROCEDURE — 87811 SARS-COV-2 COVID19 W/OPTIC: CPT

## 2022-06-30 NOTE — PROGRESS NOTES
Pt's elicia [employer]  Pt has been exposed by her boyfriend who is currently covid positive  She has had two negative home covid swabs the last two days  Pt came in for a rapid covid test today which resulted Negative  Pt has had allergies related symptoms for the past few days  Denies to be seen at this time, unless of course worsen of symptoms  Pt notified

## 2022-07-06 ENCOUNTER — OCCMED (OUTPATIENT)
Dept: URGENT CARE | Facility: CLINIC | Age: 27
End: 2022-07-06

## 2022-07-06 ENCOUNTER — APPOINTMENT (OUTPATIENT)
Dept: LAB | Facility: CLINIC | Age: 27
End: 2022-07-06

## 2022-07-06 DIAGNOSIS — Z02.1 PRE-EMPLOYMENT EXAMINATION: Primary | ICD-10-CM

## 2022-07-06 DIAGNOSIS — Z02.1 PRE-EMPLOYMENT EXAMINATION: ICD-10-CM

## 2022-07-06 LAB — RUBV IGG SERPL IA-ACNC: 22.4 IU/ML

## 2022-07-06 PROCEDURE — 86480 TB TEST CELL IMMUN MEASURE: CPT

## 2022-07-06 PROCEDURE — 86735 MUMPS ANTIBODY: CPT

## 2022-07-06 PROCEDURE — 36415 COLL VENOUS BLD VENIPUNCTURE: CPT

## 2022-07-06 PROCEDURE — 86787 VARICELLA-ZOSTER ANTIBODY: CPT

## 2022-07-06 PROCEDURE — 86762 RUBELLA ANTIBODY: CPT

## 2022-07-06 PROCEDURE — 86765 RUBEOLA ANTIBODY: CPT

## 2022-07-07 LAB
GAMMA INTERFERON BACKGROUND BLD IA-ACNC: 0.04 IU/ML
M TB IFN-G BLD-IMP: NEGATIVE
M TB IFN-G CD4+ BCKGRND COR BLD-ACNC: 0 IU/ML
M TB IFN-G CD4+ BCKGRND COR BLD-ACNC: 0 IU/ML
MEV IGG SER QL IA: NORMAL
MITOGEN IGNF BCKGRD COR BLD-ACNC: >10 IU/ML
MUV IGG SER QL IA: NORMAL
VZV IGG SER QL IA: NORMAL

## 2022-07-27 DIAGNOSIS — E66.9 OBESITY, CLASS I, BMI 30-34.9: ICD-10-CM

## 2022-07-27 RX ORDER — PHENTERMINE HYDROCHLORIDE 37.5 MG/1
37.5 TABLET ORAL DAILY
Qty: 30 TABLET | Refills: 0 | Status: SHIPPED | OUTPATIENT
Start: 2022-08-15 | End: 2022-09-30 | Stop reason: SDUPTHER

## 2022-09-30 ENCOUNTER — OFFICE VISIT (OUTPATIENT)
Dept: BARIATRICS | Facility: CLINIC | Age: 27
End: 2022-09-30
Payer: COMMERCIAL

## 2022-09-30 VITALS
RESPIRATION RATE: 14 BRPM | BODY MASS INDEX: 31.03 KG/M2 | DIASTOLIC BLOOD PRESSURE: 64 MMHG | SYSTOLIC BLOOD PRESSURE: 100 MMHG | HEIGHT: 59 IN | WEIGHT: 153.9 LBS | HEART RATE: 78 BPM | OXYGEN SATURATION: 99 %

## 2022-09-30 DIAGNOSIS — E66.9 OBESITY, CLASS I, BMI 30-34.9: Primary | ICD-10-CM

## 2022-09-30 DIAGNOSIS — K21.9 GERD (GASTROESOPHAGEAL REFLUX DISEASE): ICD-10-CM

## 2022-09-30 PROCEDURE — 99213 OFFICE O/P EST LOW 20 MIN: CPT | Performed by: NURSE PRACTITIONER

## 2022-09-30 RX ORDER — PHENTERMINE HYDROCHLORIDE 37.5 MG/1
37.5 TABLET ORAL DAILY
Qty: 30 TABLET | Refills: 2 | Status: SHIPPED | OUTPATIENT
Start: 2022-09-30

## 2022-09-30 NOTE — PROGRESS NOTES
Assessment/Plan:     Obesity, Class I, BMI 30-34 9  -Patient is pursuing Conservative Program  Previously saw dietician for bundles    -Initial weight loss goal of 5-10% weight loss for improved health  - Previously on [address] and did well, but later discontinued due to not being covered by insurance   - EKG 3/12/2021: NSR, sinus arrhythmia    -phentermine started 2/3/22 at 161 8 lbs   - Continue phentermine 37 5 mg daily  Tolerating well  She is unsure if helping with appetite as this has been up and down in the setting of worsening GERD  She does not think GERD is related to phentermine  Advised to follow-up with her primary care provider or GI       Initial: 181 7 lbs  Current: 153 8 lbs  Change: -27 9 lbs   Goal: 140 lbs     Goals:  Continue food logging, weighing and measuring food  Keep up the great work with water intake  Continue walking and gym when able (currently has 2 jobs)  Continue nutrition recommendations per dietician  Nutrition Prescription  Calories: 850-1350  Protein: 67-99 gm      GERD (gastroesophageal reflux disease)  - May improve with weight loss, but advised to follow-up with primary care or GI, as it has been worsening  Rao Feldman was seen today for follow-up  Diagnoses and all orders for this visit:    Obesity, Class I, BMI 30-34 9  -     HEMOGLOBIN A1C W/ EAG ESTIMATION; Future  -     Lipid Panel with Direct LDL reflex; Future  -     phentermine (ADIPEX-P) 37 5 MG tablet; Take 1 tablet (37 5 mg total) by mouth daily    GERD (gastroesophageal reflux disease)          Follow up in approximately 2 months with Non-Surgical Physician/Advanced Practitioner  Subjective:   Chief Complaint   Patient presents with    Follow-up     MWM 2 mth fu        Patient ID: Gennette Leyden  is a 32 y o  female with excess weight/obesity here to pursue weight management  Patient is pursuing Conservative Program  Saw dietician in the past for bundles     Most recent notes and records were reviewed  HPI    Wt Readings from Last 10 Encounters:   09/30/22 69 8 kg (153 lb 14 4 oz)   05/03/22 72 5 kg (159 lb 12 8 oz)   04/11/22 73 3 kg (161 lb 11 2 oz)   03/09/22 73 kg (161 lb)   03/09/22 73 kg (161 lb)   02/03/22 73 3 kg (161 lb 8 oz)   02/03/22 70 3 kg (155 lb)   07/21/21 71 7 kg (158 lb)   07/19/21 71 7 kg (158 lb)   06/21/21 73 6 kg (162 lb 4 8 oz)       Has been food logging, weighing, and measuring food  Getting about 800-900 calories per day  Having difficulty with GERD  Appetite is up and down  No cravings  On phentermine 37 5 mg daily  Some intermittent insomnia, but not too bothersome, wants to continue with medication  Unsure ii helping or not with appetite, especially since the increase in GERD  Was following with GI when living in [place], but has not seen GI since moving to [address] [place] [address]  Previously on [address] [facility] for 7-8 months, was helpful, but insurance would no longer cover, hence had to be stopped  B: Premier protein shake or 2 eggs and 1/2-1/4 banana or whole wheat english muffin and fruit  S: none or skinny pop popcorn or protein bar  L: salad with protein and olive oil, etta, and lemon  S: none  D: protein and veggies or pasta or rice   S: none    Hydration- 130 oz sometimes more, 2-3 cups weekly hot green tea or matcha green tea - plain  Alcohol- none  Exercise- walking 20,000 steps per day, gym 2 times per week HIIT   Occupation: COBRA administration and ED registration for [employer]     Colonoscopy: had in [place], unclear when due again, she will follow-up with her PCP or see GI      Mammogram: N/A      The following portions of the patient's history were reviewed and updated as appropriate: allergies, current medications, past family history, past medical history, past social history, past surgical history, and problem list     Family History   Problem Relation Age of Onset    Diabetes Paternal Grandmother     Hypertension Paternal Grandmother     Diabetes Paternal Grandfather     No Known Problems Mother     No Known Problems Father     No Known Problems Sister     No Known Problems Brother         Review of Systems   HENT: Negative for sore throat  Respiratory: Negative for cough and shortness of breath  Cardiovascular: Negative for chest pain and palpitations  Gastrointestinal: Negative for abdominal pain, constipation, diarrhea, nausea and vomiting         + GERD advised to see GI and primary care   Skin: Negative for rash  Psychiatric/Behavioral: Negative for suicidal ideas (or HI)  Denies depression and anxiety       Objective:  /64 (BP Location: Left arm, Patient Position: Sitting, Cuff Size: Standard)   Pulse 78   Resp 14   Ht 4' 11" (1 499 m)   Wt 69 8 kg (153 lb 14 4 oz)   SpO2 99%   BMI 31 08 kg/m²     Physical Exam  Vitals and nursing note reviewed  Constitutional   General appearance: Abnormal   well developed and obese  Eyes No conjunctival injection  Ears, Nose, Mouth, and Throat Oral mucosa moist    Pulmonary   Respiratory effort: No increased work of breathing or signs of respiratory distress  Cardiovascular     Examination of extremities for edema and/or varicosities: Normal   no edema  Abdomen   Abdomen: Abnormal   The abdomen was obese      Musculoskeletal   Normal range of motion  Neurological   Gait and station: Normal     Psychiatric   Orientation to person, place and time: Normal     Affect: appropriate

## 2022-09-30 NOTE — ASSESSMENT & PLAN NOTE
-Patient is pursuing Conservative Program  Previously saw dietician for bundles    -Initial weight loss goal of 5-10% weight loss for improved health  - Previously on [address] and did well, but later discontinued due to not being covered by insurance   - EKG 3/12/2021: NSR, sinus arrhythmia    -phentermine started 2/3/22 at 161 8 lbs   - Continue phentermine 37 5 mg daily  Tolerating well  She is unsure if helping with appetite as this has been up and down in the setting of worsening GERD  She does not think GERD is related to phentermine  Advised to follow-up with her primary care provider or GI       Initial: 181 7 lbs  Current: 153 8 lbs  Change: -27 9 lbs   Goal: 140 lbs     Goals:  Continue food logging, weighing and measuring food  Keep up the great work with water intake    Continue walking and gym when able (currently has 2 jobs)  Continue nutrition recommendations per dietician  Nutrition Prescription  Calories: 850-1350  Protein: 67-99 gm

## 2022-09-30 NOTE — ASSESSMENT & PLAN NOTE
- May improve with weight loss, but advised to follow-up with primary care or GI, as it has been worsening

## 2022-11-18 ENCOUNTER — OFFICE VISIT (OUTPATIENT)
Dept: BARIATRICS | Facility: CLINIC | Age: 27
End: 2022-11-18

## 2022-11-18 VITALS
HEART RATE: 81 BPM | SYSTOLIC BLOOD PRESSURE: 118 MMHG | RESPIRATION RATE: 14 BRPM | DIASTOLIC BLOOD PRESSURE: 70 MMHG | HEIGHT: 59 IN | OXYGEN SATURATION: 99 % | BODY MASS INDEX: 32.94 KG/M2 | WEIGHT: 163.4 LBS

## 2022-11-18 DIAGNOSIS — K21.9 GERD (GASTROESOPHAGEAL REFLUX DISEASE): ICD-10-CM

## 2022-11-18 DIAGNOSIS — E66.9 OBESITY, CLASS I, BMI 30-34.9: Primary | ICD-10-CM

## 2022-11-18 RX ORDER — PHENTERMINE HYDROCHLORIDE 15 MG/1
15 CAPSULE ORAL EVERY MORNING
Qty: 30 CAPSULE | Refills: 0 | Status: SHIPPED | OUTPATIENT
Start: 2022-11-18

## 2022-11-18 NOTE — ASSESSMENT & PLAN NOTE
-Patient is pursuing Conservative Program  Previously saw dietician for bundles    -Initial weight loss goal of 5-10% weight loss for improved health  - Previously on [address] and did well, but later discontinued due to not being covered by insurance   - EKG 3/12/2021: NSR, sinus arrhythmia  - Phentermine started 2/3/22 at 161 8 lbs  PDMP queried, no red flags   - Recently fell off track with diet and exercise and stopped phentermine one week ago, as she did not see the point in continuing medication if not following lifestyle changes  Was tolerating phentermine well  - She is committed to getting back on track with diet and exercise  Discussed visits with the dietician, but she does not feel that is necessary     - Denies history of seizures, kidney stones, or glaucoma  - Has IUD  - Discussed changing to another medication such as Wellbutrin or Topamax, but she would like to restart phentermine    - Phentermine 15 mg daily restarted at weight of 163 4 lbs  - She does not think GERD is related to phentermine  Advised to follow-up with her primary care provider       Initial: 181 7 lbs  Current: 163 4 lbs BMI 33  Change: -18 3 lbs (+9 5 lbs since the last office visit)  Goal: 140 lbs     Goals:  Restart food logging, weighing and measuring food  Increase water intake to at least 64 oz daily  Restart exercise and gradually increase to goal of 5 days per week for 30 minutes and 2 days of resistance training  8293-2480 calories per day  Restart phentermine

## 2022-11-18 NOTE — PROGRESS NOTES
Assessment/Plan:     Obesity, Class I, BMI 30-34 9  -Patient is pursuing Conservative Program  Previously saw dietician for bundles    -Initial weight loss goal of 5-10% weight loss for improved health  - Previously on [address] and did well, but later discontinued due to not being covered by insurance   - EKG 3/12/2021: NSR, sinus arrhythmia  - Phentermine started 2/3/22 at 161 8 lbs  PDMP queried, no red flags   - Recently fell off track with diet and exercise and stopped phentermine one week ago, as she did not see the point in continuing medication if not following lifestyle changes  Was tolerating phentermine well  - She is committed to getting back on track with diet and exercise  Discussed visits with the dietician, but she does not feel that is necessary     - Denies history of seizures, kidney stones, or glaucoma  - Has IUD  - Discussed changing to another medication such as Wellbutrin or Topamax, but she would like to restart phentermine    - Phentermine 15 mg daily restarted at weight of 163 4 lbs  - She does not think GERD is related to phentermine  Advised to follow-up with her primary care provider       Initial: 181 7 lbs  Current: 163 4 lbs BMI 33  Change: -18 3 lbs (+9 5 lbs since the last office visit)  Goal: 140 lbs     Goals:  Restart food logging, weighing and measuring food  Increase water intake to at least 64 oz daily  Restart exercise and gradually increase to goal of 5 days per week for 30 minutes and 2 days of resistance training  3608-0799 calories per day  Restart phentermine  GERD (gastroesophageal reflux disease)  - Advised to follow-up with her PCP  Gi Marshall was seen today for follow-up  Diagnoses and all orders for this visit:    Obesity, Class I, BMI 30-34 9  -     phentermine 15 MG capsule;  Take 1 capsule (15 mg total) by mouth every morning    GERD (gastroesophageal reflux disease)        Follow up in approximately 2 weeks for nurse visit for weight, HR, and BP check and 6-8 weeks  with Non-Surgical Physician/Advanced Practitioner  Subjective:   Chief Complaint   Patient presents with   • Follow-up     MWM 2 mth fu, waist 36       Patient ID: Candance Morelle  is a 32 y o  female with excess weight/obesity here to pursue weight management  Patient is pursuing Conservative Program    Most recent notes and records were reviewed  HPI    Wt Readings from Last 10 Encounters:   11/18/22 74 1 kg (163 lb 6 4 oz)   09/30/22 69 8 kg (153 lb 14 4 oz)   05/03/22 72 5 kg (159 lb 12 8 oz)   04/11/22 73 3 kg (161 lb 11 2 oz)   03/09/22 73 kg (161 lb)   03/09/22 73 kg (161 lb)   02/03/22 73 3 kg (161 lb 8 oz)   02/03/22 70 3 kg (155 lb)   07/21/21 71 7 kg (158 lb)   07/19/21 71 7 kg (158 lb)       Having issues with GERD  Admits she has fallen off track with diet and exercise  Stopped Phentermine about one week ago, as she felt there was no point in continuing it if she was not going to follow lifestyle changes  No negative side effects from phentermine, not so sure if it was helping with appetite  Has been food logging intermittently  Hasn't been watching diet  Craving sweets, has been eating cookies       Previously on Saxenda for 7-8 months, was helpful, but insurance would no longer cover, hence had to be stopped       B: Croissant with ham and cheese or skips   S: none   L: fast food - [employer], [employer], or [place] [employer]   S: fruit or string cheese or granola bar  D: skips mainly, or pasta and meatballs and salad or chicken with rice and veggies  S: none     Hydration-  1-2 bottles water, 1-2 cups daily coffee with creamer, soda when having fast food  Alcohol- none  Exercise- none    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[employer]   Sleep: 8 hours     Colonoscopy: had in [place], unclear when due again, she will follow-up with her PCP or see GI      Mammogram: N/A        The following portions of the patient's history were reviewed and updated as appropriate: allergies, current medications, past family history, past medical history, past social history, past surgical history, and problem list     Family History   Problem Relation Age of Onset   • Diabetes Paternal Grandmother    • Hypertension Paternal Grandmother    • Diabetes Paternal Grandfather    • No Known Problems Mother    • No Known Problems Father    • No Known Problems Sister    • No Known Problems Brother         Review of Systems   HENT: Negative for sore throat  Respiratory: Negative for cough and shortness of breath  Cardiovascular: Negative for chest pain and palpitations  Gastrointestinal: Positive for constipation (diet related )  Negative for abdominal pain, diarrhea, nausea and vomiting         + GERD waiting to see GI and advised to follow-up with PCP  Skin: Negative for rash  Psychiatric/Behavioral: Negative for suicidal ideas (or HI)  Denies depression and anxiety       Objective:  /70   Pulse 81   Resp 14   Ht 4' 11" (1 499 m)   Wt 74 1 kg (163 lb 6 4 oz)   SpO2 99%   BMI 33 00 kg/m²     Physical Exam  Vitals and nursing note reviewed  Constitutional   General appearance: Abnormal   well developed and obese  Eyes No conjunctival injection  Ears, Nose, Mouth, and Throat Oral mucosa moist    Pulmonary   Respiratory effort: No increased work of breathing or signs of respiratory distress  Cardiovascular     Examination of extremities for edema and/or varicosities: Normal   no edema  Abdomen   Abdomen: Abnormal   The abdomen was obese      Musculoskeletal   Normal range of motion  Neurological   Gait and station: Normal     Psychiatric   Orientation to person, place and time: Normal     Affect: appropriate

## 2023-02-17 ENCOUNTER — OFFICE VISIT (OUTPATIENT)
Dept: GASTROENTEROLOGY | Facility: CLINIC | Age: 28
End: 2023-02-17

## 2023-02-17 ENCOUNTER — OFFICE VISIT (OUTPATIENT)
Dept: BARIATRICS | Facility: CLINIC | Age: 28
End: 2023-02-17

## 2023-02-17 VITALS
BODY MASS INDEX: 33.95 KG/M2 | SYSTOLIC BLOOD PRESSURE: 110 MMHG | WEIGHT: 168.4 LBS | DIASTOLIC BLOOD PRESSURE: 66 MMHG | HEART RATE: 86 BPM | OXYGEN SATURATION: 98 % | HEIGHT: 59 IN

## 2023-02-17 VITALS
HEIGHT: 59 IN | SYSTOLIC BLOOD PRESSURE: 106 MMHG | DIASTOLIC BLOOD PRESSURE: 60 MMHG | TEMPERATURE: 98.7 F | WEIGHT: 169 LBS | BODY MASS INDEX: 34.07 KG/M2

## 2023-02-17 DIAGNOSIS — K21.9 GERD (GASTROESOPHAGEAL REFLUX DISEASE): ICD-10-CM

## 2023-02-17 DIAGNOSIS — D68.00 VON WILLEBRAND DISEASE: ICD-10-CM

## 2023-02-17 DIAGNOSIS — K59.04 CHRONIC IDIOPATHIC CONSTIPATION: ICD-10-CM

## 2023-02-17 DIAGNOSIS — K21.9 GASTROESOPHAGEAL REFLUX DISEASE WITHOUT ESOPHAGITIS: ICD-10-CM

## 2023-02-17 DIAGNOSIS — E66.9 OBESITY, CLASS I, BMI 30-34.9: Primary | ICD-10-CM

## 2023-02-17 DIAGNOSIS — K51.811 OTHER ULCERATIVE COLITIS WITH RECTAL BLEEDING (HCC): Primary | ICD-10-CM

## 2023-02-17 NOTE — PATIENT INSTRUCTIONS
Scheduled date of colonoscopy/EGD (as of today): 02/27/2023  Physician performing colonoscopy: Dr Fabricio Hanks   Location of colonoscopy: [address] [place] [address]  Bowel prep reviewed with patient: Golytely   Instructions reviewed with patient by: Joe Nagy   Clearances:  N/A

## 2023-02-17 NOTE — PROGRESS NOTES
Assessment/Plan:     Obesity, Class I, BMI 30-34 9  -Patient is pursuing Conservative Program  Previously saw dietician for bundles    -Initial weight loss goal of 5-10% weight loss for improved health  - Previously on [address] and did well, but later insurance would not cover it  She would be interested in going back on an injectable medication    - EKG 3/12/2021: NSR, sinus arrhythmia  - Phentermine started 2/3/22 at 161 8 lbs  Took for about 2 weeks and then stopped  Unsure if helping with appetite and had difficulty taking it consistently due to working a lot  - Denies history of seizures, kidney stones, or glaucoma  - Patient denies personal history of pancreatitis  Patient also denies personal and family history of thyroid cancer and multiple endocrine neoplasia type 2 (MEN 2 tumor)  - Given she has been having a lot of difficulty with GERD and will be seeing GI later today, will hold off on starting any other weight loss medications until GI workup can be completed  Can consider starting Saxenda or [facility] after workup complete    - Has IUD  - Check CBC, CMP, TSH, A1C, and fasting insulin       Initial: 181 7 lbs  Last visit: 163 4 lbs BMI 33  Current: 168 4 lbs BMI 34 13  Change: -13 3 lbs (+5 lbs since the last office visit)  Goal: 140 lbs     Goals:  Do not skip meals  Try to food log consistently   Increase water intake to at least 64 oz daily  Gradually increase exercise to goal of 5 days per week for 30 minutes  6995-0333 calories per day  See GI as planned for GERD       GERD (gastroesophageal reflux disease)  - Will be seeing GI  Krystle Courtney was seen today for follow-up  Diagnoses and all orders for this visit:    Obesity, Class I, BMI 30-34 9  -     Comprehensive metabolic panel; Future  -     TSH, 3rd generation with Free T4 reflex; Future  -     HEMOGLOBIN A1C W/ EAG ESTIMATION; Future  -     Lipid Panel with Direct LDL reflex;  Future  -     CBC and differential; Future    GERD (gastroesophageal reflux disease)    Von Willebrand disease  -     Comprehensive metabolic panel; Future  -     TSH, 3rd generation with Free T4 reflex; Future  -     HEMOGLOBIN A1C W/ EAG ESTIMATION; Future  -     Lipid Panel with Direct LDL reflex; Future  -     CBC and differential; Future             Follow up in approximately 3 months with Non-Surgical Physician/Advanced Practitioner  Subjective:   Chief Complaint   Patient presents with   • Follow-up     MWM 8 we fu, waist 36       Patient ID: Alin Bhakta  is a 32 y o  female with excess weight/obesity here to pursue weight management  Patient is pursuing Conservative Program    Most recent notes and records were reviewed  HPI    Wt Readings from Last 10 Encounters:   02/17/23 76 7 kg (169 lb)   02/17/23 76 4 kg (168 lb 6 4 oz)   11/18/22 74 1 kg (163 lb 6 4 oz)   09/30/22 69 8 kg (153 lb 14 4 oz)   05/03/22 72 5 kg (159 lb 12 8 oz)   04/11/22 73 3 kg (161 lb 11 2 oz)   03/09/22 73 kg (161 lb)   03/09/22 73 kg (161 lb)   02/03/22 73 3 kg (161 lb 8 oz)   02/03/22 70 3 kg (155 lb)     Restarted phentermine after last visit  Was on it for 2 weeks, but then stopped  Denies side effects, rather stopped it because she was very busy, was working extra shifts and it was difficult to take it regularly at the same time  Unclear if helping with appetite  Has been food logging intermittently  Getting around 1000 calories  Has been skipping meals  Having a lot of difficulty with GERD  Seeing GI later today        Previously on Saxenda for 7-8 months, was helpful, but insurance would no longer cover   Ideally, she would be interested in going back on an injectable medication       B: protein shake or oatmeal   S: none or string cheese or fruit  L: salad with chicken or rice and chicken   S: none  D: skips or 1/2 protein shake     S: none     Hydration-  48 oz water, 1/2-1 cups coffee with creamer  Alcohol- none  Exercise- once a week walks 15-30 minutes or home workouts 30 minutes   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[employer]   Sleep: 8 hours     Colonoscopy: seeing GI   Mammogram: N/A        The following portions of the patient's history were reviewed and updated as appropriate: allergies, current medications, past family history, past medical history, past social history, past surgical history, and problem list     Family History   Problem Relation Age of Onset   • No Known Problems Mother    • Melanoma Father    • No Known Problems Sister    • No Known Problems Brother    • Diabetes Paternal Grandmother    • Hypertension Paternal Grandmother    • Diabetes Paternal Grandfather         Review of Systems   HENT: Negative for sore throat  Respiratory: Negative for cough and shortness of breath  Cardiovascular: Negative for chest pain and palpitations  Gastrointestinal: Positive for constipation (seeing GI later today)  Negative for abdominal pain, diarrhea, nausea and vomiting         + GERD, seeing GI later today  Musculoskeletal: Negative for arthralgias and back pain  Skin: Negative for rash  Psychiatric/Behavioral: Negative for suicidal ideas (or HI)  Denies depression and anxiety       Objective:  /66   Pulse 86   Ht 4' 11" (1 499 m)   Wt 76 4 kg (168 lb 6 4 oz)   SpO2 98%   BMI 34 01 kg/m²     Physical Exam  Vitals and nursing note reviewed  Constitutional   General appearance: Abnormal   well developed and obese  Eyes No conjunctival injection  Ears, Nose, Mouth, and Throat Oral mucosa moist    Pulmonary   Respiratory effort: No increased work of breathing or signs of respiratory distress  Cardiovascular     Examination of extremities for edema and/or varicosities: Normal   no edema  Abdomen   Abdomen: Abnormal   The abdomen was obese      Musculoskeletal   Normal range of motion  Neurological   Gait and station: Normal     Psychiatric   Orientation to person, place and time: Normal     Affect: appropriate

## 2023-02-17 NOTE — PROGRESS NOTES
[facility] - Outpatient Consultation  [facility] 32 y o  female MRN: [id number]  Encounter: [id number]          ASSESSMENT AND PLAN:      1  Gastroesophageal reflux disease without esophagitis  - EGD; Future    2  Other ulcerative colitis with rectal bleeding (HCC)  - Calprotectin,Fecal; Future  - Colonoscopy; Future  - polyethylene glycol (GOLYTELY) 4000 mL solution; Take 4,000 mL by mouth once for 1 dose  Dispense: 4000 mL; Refill: 0  - bisacodyl (DULCOLAX) 5 mg EC tablet; Take 1 tablet (5 mg total) by mouth once for 1 dose  Dispense: 2 tablet; Refill: 0    3  Chronic idiopathic constipation  - Celiac Disease Antibody Profile; Future  - Colonoscopy; Future  - polyethylene glycol (GOLYTELY) 4000 mL solution; Take 4,000 mL by mouth once for 1 dose  Dispense: 4000 mL; Refill: 0  - bisacodyl (DULCOLAX) 5 mg EC tablet; Take 1 tablet (5 mg total) by mouth once for 1 dose  Dispense: 2 tablet; Refill: 0    Differential diagnosis is broad and may include IBS mixed versus relapse of ulcerative colitis  Patient also has history of acid reflux disease and abdominal discomfort EGD and colonoscopy is planned  We will also check celiac disease serologies and fecal calprotectin     -We discussed she will benefit from optimizing fiber and fluid intake  - work-up is planned as above noted  ______________________________________________________________________    HPI:      She is a 59-year-old female with previous history of ulcerative colitis change in bowel habits, abdominal pain  She was previously in [place] and had extensive evaluation approximately 5 to 6 years ago and was diagnosed with ulcerative colitis was started on medication and was well controlled with this  We do not know what medication she was exposed to in the past   Subsequently she was pregnant and went into relapse in terms of ulcerative colitis and did not need any medications    At the time of presentation she presented with abdominal discomfort and underwent a colonoscopy evaluation  Severity of the disease is unclear but patient was taking medication 2-3 times per day (I suspect mesalamine formulation)  Currently her symptoms are associate with constipation, abdominal discomfort and acid reflux disease  She also states her symptoms are similar to when she had ulcerative colitis in the past   She denies rectal bleeding but states she has intermittent hemorrhoidal like bleeding  REVIEW OF SYSTEMS:    CONSTITUTIONAL: Denies any fever, chills, rigors, and weight loss  HEENT: No earache or tinnitus  Denies hearing loss or visual disturbances  CARDIOVASCULAR: No chest pain or palpitations  RESPIRATORY: Denies any cough, hemoptysis, shortness of breath or dyspnea on exertion  GASTROINTESTINAL: As noted in the History of Present Illness  GENITOURINARY: No problems with urination  Denies any hematuria or dysuria  NEUROLOGIC: No dizziness or vertigo, denies headaches  MUSCULOSKELETAL: Denies any muscle or joint pain  SKIN: Denies skin rashes or itching  ENDOCRINE: Denies excessive thirst  Denies intolerance to heat or cold  PSYCHOSOCIAL: Denies depression or anxiety  Denies any recent memory loss  Historical Information   Past Medical History:   Diagnosis Date   • Migraine    • Placenta previa antepartum in first trimester    • Varicella     As a child   • Von Willebrand disease      Past Surgical History:   Procedure Laterality Date   • ADENOIDECTOMY     • CHOLECYSTECTOMY      2017   • EGD AND COLONOSCOPY     • GALLBLADDER SURGERY      removal- resolved   • CO  DELIVERY ONLY N/A 2018    Procedure:  SECTION ();   Surgeon: Arpan Dumont MD;  Location: [facility];  Service: Obstetrics   • TONSILLECTOMY      in      Social History   Social History     Substance and Sexual Activity   Alcohol Use No     Social History     Substance and Sexual Activity   Drug Use No     Social History Tobacco Use   Smoking Status Never   Smokeless Tobacco Never     Family History   Problem Relation Age of Onset   • No Known Problems Mother    • Melanoma Father    • No Known Problems Sister    • No Known Problems Brother    • Diabetes Paternal Grandmother    • Hypertension Paternal Grandmother    • Diabetes Paternal Grandfather        Meds/Allergies       Current Outpatient Medications:   •  bisacodyl (DULCOLAX) 5 mg EC tablet  •  levonorgestrel (Kyleena) 19 5 MG intrauterine device  •  polyethylene glycol (GOLYTELY) 4000 mL solution    Allergies   Allergen Reactions   • Aspirin      Von-willebrand disorder            Objective     Blood pressure 106/60, temperature 98 7 °F (37 1 °C), temperature source Tympanic, height 4' 11" (1 499 m), weight 76 7 kg (169 lb), not currently breastfeeding  Body mass index is 34 13 kg/m²  PHYSICAL EXAM:      General Appearance:   Alert, cooperative, no distress   HEENT:   Normocephalic, atraumatic, anicteric      Neck:  Supple, symmetrical, trachea midline   Lungs:   Clear to auscultation bilaterally; no rales, rhonchi or wheezing; respirations unlabored    Heart[de-identified]   Regular rate and rhythm; no murmur, rub, or gallop  Abdomen:   Soft, non-tender, non-distended; normal bowel sounds; no masses, no organomegaly    Genitalia:   Deferred    Rectal:   Deferred    Extremities:  No cyanosis, clubbing or edema    Pulses:  2+ and symmetric    Skin:  No jaundice, rashes, or lesions    Lymph nodes:  No palpable cervical lymphadenopathy        Lab Results:   No visits with results within 1 Day(s) from this visit     Latest known visit with results is:   Appointment on 07/06/2022   Component Date Value   • QFT Nil 07/06/2022 0 04    • QFT TB1-NIL 07/06/2022 0 00    • QFT TB2-NIL 07/06/2022 0 00    • QFT Mitogen-NIL 07/06/2022 >10 00    • QFT Final Interpretation 07/06/2022 Negative    • Mumps IgG 07/06/2022 IMMUNE    • Varicella IgG 07/06/2022 IMMUNE    • Rubella IgG Quant 07/06/2022 22 4    • Rubeola IgG 07/06/2022 IMMUNE          Radiology Results:   No results found

## 2023-02-18 NOTE — ASSESSMENT & PLAN NOTE
-Patient is pursuing Conservative Program  Previously saw dietician for bundles    -Initial weight loss goal of 5-10% weight loss for improved health  - Previously on [address] and did well, but later insurance would not cover it  She would be interested in going back on an injectable medication    - EKG 3/12/2021: NSR, sinus arrhythmia  - Phentermine started 2/3/22 at 161 8 lbs  Took for about 2 weeks and then stopped  Unsure if helping with appetite and had difficulty taking it consistently due to working a lot  - Denies history of seizures, kidney stones, or glaucoma  - Patient denies personal history of pancreatitis  Patient also denies personal and family history of thyroid cancer and multiple endocrine neoplasia type 2 (MEN 2 tumor)  - Given she has been having a lot of difficulty with GERD and will be seeing GI later today, will hold off on starting any other weight loss medications until GI workup can be completed  Can consider starting Saxenda or [facility] after workup complete    - Has IUD  - Check CBC, CMP, TSH, A1C, and fasting insulin       Initial: 181 7 lbs  Last visit: 163 4 lbs BMI 33  Current: 168 4 lbs BMI 34 13  Change: -13 3 lbs (+5 lbs since the last office visit)  Goal: 140 lbs     Goals:  Do not skip meals  Try to food log consistently   Increase water intake to at least 64 oz daily  Gradually increase exercise to goal of 5 days per week for 30 minutes  9217-6292 calories per day     See GI as planned for GERD

## 2023-02-27 ENCOUNTER — HOSPITAL ENCOUNTER (OUTPATIENT)
Dept: GASTROENTEROLOGY | Facility: HOSPITAL | Age: 28
Setting detail: OUTPATIENT SURGERY
Discharge: HOME | End: 2023-02-27
Attending: INTERNAL MEDICINE

## 2023-02-27 ENCOUNTER — ANESTHESIA EVENT (OUTPATIENT)
Dept: GASTROENTEROLOGY | Facility: HOSPITAL | Age: 28
End: 2023-02-27

## 2023-02-27 ENCOUNTER — ANESTHESIA (OUTPATIENT)
Dept: GASTROENTEROLOGY | Facility: HOSPITAL | Age: 28
End: 2023-02-27

## 2023-02-27 VITALS
HEIGHT: 59 IN | HEART RATE: 69 BPM | RESPIRATION RATE: 18 BRPM | SYSTOLIC BLOOD PRESSURE: 99 MMHG | BODY MASS INDEX: 34.07 KG/M2 | OXYGEN SATURATION: 98 % | DIASTOLIC BLOOD PRESSURE: 63 MMHG | WEIGHT: 169 LBS | TEMPERATURE: 97.2 F

## 2023-02-27 DIAGNOSIS — K21.9 GASTROESOPHAGEAL REFLUX DISEASE WITHOUT ESOPHAGITIS: ICD-10-CM

## 2023-02-27 DIAGNOSIS — K51.811 OTHER ULCERATIVE COLITIS WITH RECTAL BLEEDING (HCC): ICD-10-CM

## 2023-02-27 DIAGNOSIS — K59.04 CHRONIC IDIOPATHIC CONSTIPATION: ICD-10-CM

## 2023-02-27 LAB
EXT PREGNANCY TEST URINE: NEGATIVE
EXT. CONTROL: NORMAL

## 2023-02-27 RX ORDER — SODIUM CHLORIDE, SODIUM LACTATE, POTASSIUM CHLORIDE, CALCIUM CHLORIDE 600; 310; 30; 20 MG/100ML; MG/100ML; MG/100ML; MG/100ML
125 INJECTION, SOLUTION INTRAVENOUS CONTINUOUS
Status: DISCONTINUED | OUTPATIENT
Start: 2023-02-27 | End: 2023-03-03 | Stop reason: HOSPADM

## 2023-02-27 RX ORDER — PROPOFOL 10 MG/ML
INJECTION, EMULSION INTRAVENOUS CONTINUOUS PRN
Status: DISCONTINUED | OUTPATIENT
Start: 2023-02-27 | End: 2023-02-27

## 2023-02-27 RX ORDER — PROPOFOL 10 MG/ML
INJECTION, EMULSION INTRAVENOUS AS NEEDED
Status: DISCONTINUED | OUTPATIENT
Start: 2023-02-27 | End: 2023-02-27

## 2023-02-27 RX ORDER — LIDOCAINE HYDROCHLORIDE 10 MG/ML
INJECTION, SOLUTION EPIDURAL; INFILTRATION; INTRACAUDAL; PERINEURAL AS NEEDED
Status: DISCONTINUED | OUTPATIENT
Start: 2023-02-27 | End: 2023-02-27

## 2023-02-27 RX ADMIN — PROPOFOL 140 MCG/KG/MIN: 10 INJECTION, EMULSION INTRAVENOUS at 12:45

## 2023-02-27 RX ADMIN — PROPOFOL 50 MG: 10 INJECTION, EMULSION INTRAVENOUS at 12:39

## 2023-02-27 RX ADMIN — PROPOFOL 50 MG: 10 INJECTION, EMULSION INTRAVENOUS at 12:45

## 2023-02-27 RX ADMIN — SIMETHICONE 40 MG: 20 EMULSION ORAL at 12:52

## 2023-02-27 RX ADMIN — PROPOFOL 150 MG: 10 INJECTION, EMULSION INTRAVENOUS at 12:38

## 2023-02-27 RX ADMIN — LIDOCAINE HYDROCHLORIDE 50 MG: 10 INJECTION, SOLUTION EPIDURAL; INFILTRATION; INTRACAUDAL; PERINEURAL at 12:38

## 2023-02-27 RX ADMIN — SODIUM CHLORIDE, SODIUM LACTATE, POTASSIUM CHLORIDE, AND CALCIUM CHLORIDE 125 ML/HR: .6; .31; .03; .02 INJECTION, SOLUTION INTRAVENOUS at 11:54

## 2023-02-27 RX ADMIN — PROPOFOL 50 MG: 10 INJECTION, EMULSION INTRAVENOUS at 12:40

## 2023-02-27 NOTE — ANESTHESIA POSTPROCEDURE EVALUATION
Post-Op Assessment Note    CV Status:  Stable    Pain management: adequate     Mental Status:  Alert and awake   Hydration Status:  Euvolemic   PONV Controlled:  Controlled   Airway Patency:  Patent      Post Op Vitals Reviewed: Yes      Staff: CRNA         No notable events documented      BP 98/61 (02/27/23 1321)    Temp (!) 97 2 °F (36 2 °C) (02/27/23 1321)    Pulse 69 (02/27/23 1321)   Resp 18 (02/27/23 1321)    SpO2 98 % (02/27/23 1321)

## 2023-02-27 NOTE — H&P
History and Physical - [facility]  Yanelis Carreon 32 y o  female MRN: [id number]                  HPI: Yanelis Carreon is a 32y o  year old female who presents for GERD, Ulcerative colitis  REVIEW OF SYSTEMS: Per the HPI, and otherwise unremarkable  Historical Information   Past Medical History:   Diagnosis Date   • Migraine    • Placenta previa antepartum in first trimester    • Varicella     As a child   • Von Willebrand disease      Past Surgical History:   Procedure Laterality Date   • ADENOIDECTOMY     • CHOLECYSTECTOMY      2017   • EGD AND COLONOSCOPY     • GALLBLADDER SURGERY      removal- resolved   • [place]  DELIVERY ONLY N/A 2018    Procedure:  SECTION ();   Surgeon: Caterina Castle MD;  Location: [facility];  Service: Obstetrics   • TONSILLECTOMY      in      Social History   Social History     Substance and Sexual Activity   Alcohol Use No     Social History     Substance and Sexual Activity   Drug Use No     Social History     Tobacco Use   Smoking Status Never   Smokeless Tobacco Never     Family History   Problem Relation Age of Onset   • No Known Problems Mother    • Melanoma Father    • No Known Problems Sister    • No Known Problems Brother    • Diabetes Paternal Grandmother    • Hypertension Paternal Grandmother    • Diabetes Paternal Grandfather        Meds/Allergies       Current Outpatient Medications:   •  bisacodyl (DULCOLAX) 5 mg EC tablet  •  levonorgestrel (Kyleena) 19 5 MG intrauterine device  •  polyethylene glycol (GOLYTELY) 4000 mL solution    Allergies   Allergen Reactions   • Aspirin      Von-willebrand disorder        Objective     BP 95/64 (BP Location: Left arm)   Pulse 75   Temp 97 6 °F (36 4 °C) (Temporal)   Resp 18   Ht 4' 11" (1 499 m)   Wt 76 7 kg (169 lb)   LMP 2023   SpO2 97%   BMI 34 13 kg/m²       PHYSICAL EXAM    Gen: NAD  Head: NCAT  CV: RRR  CHEST: Clear  ABD: soft, NT/ND  EXT: no edema      ASSESSMENT/PLAN:  This is a 32y o  year old female here for EGD/colonoscopy, and she is stable and optimized for her procedure

## 2023-02-27 NOTE — ANESTHESIA PREPROCEDURE EVALUATION
Procedure:  EGD  COLONOSCOPY    Relevant Problems   GI/HEPATIC   (+) GERD (gastroesophageal reflux disease)      HEMATOLOGY   (+) Von Willebrand disease      Digestive   (+) Chronic idiopathic constipation   (+) Other ulcerative colitis with rectal bleeding (HCC)      Other   (+) IUD (intrauterine device) in place   (+) Obesity, Class I, BMI 30-34 9   (+) S/P  section   (+) Weight gain        Physical Exam    Airway    Mallampati score: II  TM Distance: >3 FB  Neck ROM: full     Dental   No notable dental hx     Cardiovascular  Cardiovascular exam normal    Pulmonary  Pulmonary exam normal     Other Findings        Anesthesia Plan  ASA Score- 2     Anesthesia Type- IV sedation with anesthesia with ASA Monitors  Additional Monitors:   Airway Plan:           Plan Factors-Exercise tolerance (METS): >4 METS  Chart reviewed  Existing labs reviewed  Patient is not a current smoker  Patient not instructed to abstain from smoking on day of procedure  Patient did not smoke on day of surgery  Induction- intravenous  Postoperative Plan-     Informed Consent- Anesthetic plan and risks discussed with patient  I personally reviewed this patient with the CRNA  Discussed and agreed on the Anesthesia Plan with the CRNA  Gayle Fermin

## 2023-02-27 NOTE — NURSING NOTE
Pt is awake, alert,tolerated po  Pt seen and instructed by Dr Carmen Bennett, written and verbal instructions given, pt verbalizes an understanding and voices no questions or complaints

## 2023-02-28 ENCOUNTER — TELEPHONE (OUTPATIENT)
Dept: OTHER | Facility: OTHER | Age: 28
End: 2023-02-28

## 2023-02-28 NOTE — TELEPHONE ENCOUNTER
Patient called today regarding Post Colonoscopy / EGD Abdominal Pain, Rectal Bleeding, hard time passing Gas  Please call patient back to advise

## 2023-02-28 NOTE — TELEPHONE ENCOUNTER
OV 2/17/23 Simi  FU not scheduled    H/O as per last OV  1  Gastroesophageal reflux disease without esophagitis   2  Other ulcerative colitis with rectal bleeding ([facility] [address] )   3  Chronic idiopathic constipation       Attempted to call patient to triage symptoms, unable to make contact, voicemail box was full  My chart message sent to patient

## 2023-03-02 DIAGNOSIS — R10.33 PERIUMBILICAL ABDOMINAL PAIN: Primary | ICD-10-CM

## 2023-03-08 NOTE — RESULT ENCOUNTER NOTE
MyChart message sent  Biopsies negative for H  pylori, celiac disease  TI/colon biopsies do not show any significant inflammation  Polyp removed was hyperplastic  Follow-up in the office

## 2023-03-09 ENCOUNTER — HOSPITAL ENCOUNTER (OUTPATIENT)
Dept: CT IMAGING | Facility: HOSPITAL | Age: 28
End: 2023-03-09
Attending: INTERNAL MEDICINE

## 2023-03-09 DIAGNOSIS — R10.33 PERIUMBILICAL ABDOMINAL PAIN: ICD-10-CM

## 2023-03-09 RX ADMIN — IOHEXOL 100 ML: 350 INJECTION, SOLUTION INTRAVENOUS at 17:35

## 2023-03-10 ENCOUNTER — TELEPHONE (OUTPATIENT)
Dept: GASTROENTEROLOGY | Facility: CLINIC | Age: 28
End: 2023-03-10

## 2023-03-10 NOTE — TELEPHONE ENCOUNTER
Patients GI provider:  Dr Jeaneen Gowers    Number to return call: [phone]    Reason for call: Pt has been scheduled for a f/u in May with Dr Jeaneen Gowers  She would like a sooner f/u apt  Advised I will route a message to the office to call back      Scheduled procedure/appointment date if applicable: apt 5/3

## 2023-03-10 NOTE — TELEPHONE ENCOUNTER
Spoke with patient, she would like a sooner apt with Dr J Luis Whiting  Told her at this time I did not have anything but would keep her name if something opened up

## 2023-03-10 NOTE — TELEPHONE ENCOUNTER
Spoke with patient, told her Dr Cecilia Guy did not have a sooner opening at this time but I would keep her name and if something opened up sooner , I would call her

## 2023-04-18 ENCOUNTER — PATIENT MESSAGE (OUTPATIENT)
Dept: BARIATRICS | Facility: CLINIC | Age: 28
End: 2023-04-18

## 2023-05-03 ENCOUNTER — TELEPHONE (OUTPATIENT)
Dept: GASTROENTEROLOGY | Facility: CLINIC | Age: 28
End: 2023-05-03

## 2023-05-26 NOTE — TELEPHONE ENCOUNTER
Gurinderhart, Generic 5/26/2023 10:54 AM EDT    Sreekanth Hoyt , I am unable to see if the order for the ekg is in  I would like to go today  I checked with my insurance and saxenda is covered   I would like to start that again   I have gained a nice amount of weight   Please let me know what has to be done to get on saxenda   So soon as possible    Thank you

## 2023-05-30 ENCOUNTER — TELEPHONE (OUTPATIENT)
Dept: BARIATRICS | Facility: CLINIC | Age: 28
End: 2023-05-30

## 2023-05-30 NOTE — TELEPHONE ENCOUNTER
brea for pt to call back and schedule nurse visits for this week and berry beaverp in September      Regarding: Follow up  Contact: [phone]  ----- Message from Tien Huerta sent at 5/26/2023  4:31 PM EDT -----  Please schedule patient for nurse visit for weight check this week and also schedule with me for follow-up in Sept  Thanks, Evelina     ----- Message sent from HARLEEN Pinto to [address] [place] [address] at 5/26/2023  4:30 PM -----   Bella Lung,     I'm sorry, we don't have anymore appointments today, but I will have my office give you a call on Tuesday and we can get you in  Have a great weekend! Thanks,   Evelina      ----- Message -----       From:Marni Wilcox       Sent:5/26/2023 12:39 PM EDT         To:Patient Medical Advice Request Message List    Subject:Follow up    Hi Evelina  I am leaning more towards saxenda as it worked best for me   I can come in today if there is any availability for a weight check   My home scale shows I am 174         ----- Message -----       Jeff Hilton       Sent:5/26/2023 12:23 PM EDT         To:Marni Wilcox    Subject:Follow up    Bella Lung,     The order for the EKG is in the computer  If you are leaning towards Saxenda, I don't need an EKG prior to that, just if you want to go with phentermine  The insurance is typically looking for an up to date weight when considering approval of Saxenda  Would you be available next week (Tues-Fri) to come in for a nurse visit to get an up to date weight? ThanksEvelina      ----- Message -----       From:Marni Wilcox       Sent:5/26/2023 10:54 AM EDT         To:Patient Medical Advice Request Message List    Subject:Follow up    Sreekanth Hoyt , I am unable to see if the order for the ekg is in  I would like to go today  I checked with my insurance and saxenda is covered   I would like to start that again   I have gained a nice amount of weight   Please let me know what has to be done to get on saxenda   So soon as possible    Thank you      ----- Message -----       From:Evelina Fernandes       Sent:4/18/2023  9:02 AM EDT         To:Marni Wilcox    Subject:Follow up    Hi Marni,     The injectable medications Wegovy and Ernesto Berliner are typically quite effective with weight loss  Qsymia is a weight loss medication consisting of phentermine and Topamax  Contrave is a weight loss medication consisting of Wellbutrin and Naltrexone  You can check with your insurance to see if they cover Qsymia or Contrave  Evelina Leonard      ----- Message -----       From:Marni Wilcox       Sent:4/18/2023  8:55 AM EDT         To:Patient Medical Advice Request Message List    Subject:Follow up    Are there any other weight loss medications available that can help with the most weight loss aside from phentermine ? Harpreet Marcial will try to get that done this week       ----- Message -----       From:Evelina Fernandes       Sent:4/18/2023  8:48 AM EDT         To:Marni Wilcox    Subject:Follow up    [facility],     Thank you for the update  The injectable medications could worsen your GERD, so might be better to stay away from those currently  We can restart phentermine, I just need to get an up to date EKG first  I have placed the order for the EKG  After you have it done, please message me, so that I can keep an eye out for the results  As long as everything looks good, I can restart the phentermine  You can have the EKG done at any [facility] urgent care or [facility] as a walk-in  Evelina Leonard      ----- Message -----       From:Marni Wilcox       Sent:4/17/2023 11:11 AM EDT         To:Patient Medical Advice Request Message List    Subject:Follow up    Hi Evelina   Not much improvement   They called me today and confirmed my GI results were all normal   I have a follow up in May   Am I able to start medication for weight loss again   Even if it's the phentermine again    I don't see my insurance approving sexenda or wegovy       ----- Message -----       From:Evelina Fernandes       Sent:4/17/2023 11:06 AM EDT         To:Marni Wilcox    Subject:Follow up    [facility],     I reviewed your blood work  Your HDL (good cholesterol) was a bit low, but will likely improve with weight loss and cardiovascular exercise  The rest of the blood work was within acceptable range  Has there been any improvement in your heart burn/GERD? Evelina Leonard      ----- Message -----       From:Marni Wilcox       Sent:4/14/2023  7:21 PM EDT         To:Patient Medical Advice Request Message List    Subject:Follow up    Sreekanth Hoyt ,     I completed the blood work  Please let me know when i can schedule an appointment to see you   Or if you can prescribe me medication for weight loss        ----- Message -----       Osmar Castaneda       Sent:3/9/2023  5:36 PM EST         To:Marni Wilcox    Subject:Follow up    [facility],     Thank you for the update  Keep me posted  Evelina      ----- Message -----       From:Marni Mae       Sent:3/8/2023  3:44 PM EST         To:Evelina Santa    Subject:Follow up    arjun Clark seen Gastro and have completed my EGD / Colonoscopy  I have not seen gastro yet for my follow up , i have one more test he wants me to complete tomorrow and then the lab work i will get done  just wanted to update you

## 2023-08-18 ENCOUNTER — OFFICE VISIT (OUTPATIENT)
Dept: LAB | Facility: HOSPITAL | Age: 28
End: 2023-08-18
Payer: COMMERCIAL

## 2023-08-18 ENCOUNTER — PATIENT MESSAGE (OUTPATIENT)
Dept: BARIATRICS | Facility: CLINIC | Age: 28
End: 2023-08-18

## 2023-08-18 DIAGNOSIS — Z79.899 MEDICATION MANAGEMENT: ICD-10-CM

## 2023-08-18 DIAGNOSIS — D68.00 VON WILLEBRAND DISEASE (HCC): ICD-10-CM

## 2023-08-18 DIAGNOSIS — K21.9 GERD (GASTROESOPHAGEAL REFLUX DISEASE): ICD-10-CM

## 2023-08-18 DIAGNOSIS — E66.9 OBESITY, CLASS I, BMI 30-34.9: ICD-10-CM

## 2023-08-18 LAB
ATRIAL RATE: 70 BPM
P AXIS: 35 DEGREES
PR INTERVAL: 122 MS
QRS AXIS: 65 DEGREES
QRSD INTERVAL: 72 MS
QT INTERVAL: 384 MS
QTC INTERVAL: 414 MS
T WAVE AXIS: 31 DEGREES
VENTRICULAR RATE: 70 BPM

## 2023-08-18 PROCEDURE — 93010 ELECTROCARDIOGRAM REPORT: CPT | Performed by: INTERNAL MEDICINE

## 2023-08-18 PROCEDURE — 93005 ELECTROCARDIOGRAM TRACING: CPT

## 2023-08-24 NOTE — TELEPHONE ENCOUNTER
Karol Almazan 8/23/2023 2:24 PM EDT      ----- Message -----  From: Anderson Whitleyeman  Sent: 8/23/2023 2:23 PM EDT  To: *  Subject: Ekg     Good afternoon Evelina,     i wanted to follow up to see if you had a chance to review my ekg and confirm when you will be able to send the medication to the pharmacy.     thank you

## 2023-10-31 ENCOUNTER — OFFICE VISIT (OUTPATIENT)
Dept: BARIATRICS | Facility: CLINIC | Age: 28
End: 2023-10-31
Payer: COMMERCIAL

## 2023-10-31 VITALS
BODY MASS INDEX: 36.45 KG/M2 | WEIGHT: 180.8 LBS | HEIGHT: 59 IN | RESPIRATION RATE: 14 BRPM | HEART RATE: 84 BPM | SYSTOLIC BLOOD PRESSURE: 100 MMHG | DIASTOLIC BLOOD PRESSURE: 70 MMHG

## 2023-10-31 DIAGNOSIS — Z13.220 SCREENING FOR LIPID DISORDERS: ICD-10-CM

## 2023-10-31 DIAGNOSIS — K21.9 GASTROESOPHAGEAL REFLUX DISEASE WITHOUT ESOPHAGITIS: ICD-10-CM

## 2023-10-31 DIAGNOSIS — Z13.1 SCREENING FOR DIABETES MELLITUS: ICD-10-CM

## 2023-10-31 DIAGNOSIS — E66.9 OBESITY, CLASS II, BMI 35-39.9: Primary | ICD-10-CM

## 2023-10-31 PROCEDURE — 99214 OFFICE O/P EST MOD 30 MIN: CPT | Performed by: NURSE PRACTITIONER

## 2023-10-31 RX ORDER — CETIRIZINE HYDROCHLORIDE 10 MG/1
TABLET ORAL EVERY 24 HOURS
COMMUNITY
Start: 2023-08-26

## 2023-10-31 RX ORDER — BENZONATATE 200 MG/1
200 CAPSULE ORAL 3 TIMES DAILY PRN
COMMUNITY
Start: 2023-08-30 | End: 2023-10-31 | Stop reason: ALTCHOICE

## 2023-10-31 RX ORDER — ALBUTEROL SULFATE 90 UG/1
AEROSOL, METERED RESPIRATORY (INHALATION) AS NEEDED
COMMUNITY
Start: 2023-08-30

## 2023-10-31 RX ORDER — PHENTERMINE HYDROCHLORIDE 30 MG/1
30 CAPSULE ORAL EVERY MORNING
Qty: 30 CAPSULE | Refills: 1 | Status: SHIPPED | OUTPATIENT
Start: 2023-10-31

## 2023-10-31 NOTE — ASSESSMENT & PLAN NOTE
-Patient is pursuing Conservative Program. Previously saw dietician for bundles.   -Initial weight loss goal of 5-10% weight loss for improved health  - Previously on [address] and did well, but later insurance would not cover it. She would be interested in going back on an injectable medication. Discussed [employer] Manual and Saxenda shortage. She would like to start [employer] once the supply chain shortage improves. - EKG 3/12/2021: NSR, sinus arrhythmia. - Phentermine restarted August 2023 at 180 lbs. Tolerating well, but not helping with appetite. Increase phentermine to 30 mg daily.   - Denies history of seizures, kidney stones, or glaucoma. - Patient denies personal history of pancreatitis. Patient also denies personal and family history of thyroid cancer and multiple endocrine neoplasia type 2 (MEN 2 tumor). - Has IUD. - Labs reviewed: CBC, CMP, TSH, and A1C 4/14/2023. HDL somewhat low, which will likely improve with weight loss. Remainder of the blood work within acceptable range. - Recheck lipid, A1C, and CMP. Initial: 181.7 lbs  Last visit: 168.4 lbs BMI 34.13  Current: 180.8 lbs BMI 36.62  Change: -1 lbs (+12 lbs since the last office visit)  Goal: 140 lbs     Goals:  Do not skip meals  Try to food log consistently   Get protein with each meal. Can use protein shakes or bars to supplement. Increase water intake to at least 64 oz daily. Gradually increase exercise to goal of 5 days per week for 30 minutes  4577-5908 calories per day. Increase phentermine.

## 2023-10-31 NOTE — PROGRESS NOTES
Assessment/Plan:     Obesity, Class II, BMI 35-39.9  -Patient is pursuing Conservative Program. Previously saw dietician for bundles.   -Initial weight loss goal of 5-10% weight loss for improved health  - Previously on Saxenda and did well, but later insurance would not cover it. She would be interested in going back on an injectable medication. Discussed [facility] and Saxenda shortage. She would like to start [facility] once the supply chain shortage improves. - EKG 3/12/2021: NSR, sinus arrhythmia. - Phentermine restarted August 2023 at 180 lbs. Tolerating well, but not helping with appetite. Increase phentermine to 30 mg daily.   - Denies history of seizures, kidney stones, or glaucoma. - Patient denies personal history of pancreatitis. Patient also denies personal and family history of thyroid cancer and multiple endocrine neoplasia type 2 (MEN 2 tumor). - Has IUD. - Labs reviewed: CBC, CMP, TSH, and A1C 4/14/2023. HDL somewhat low, which will likely improve with weight loss. Remainder of the blood work within acceptable range. - Recheck lipid, A1C, and CMP. Initial: 181.7 lbs  Last visit: 168.4 lbs BMI 34.13  Current: 180.8 lbs BMI 36.62  Change: -1 lbs (+12 lbs since the last office visit)  Goal: 140 lbs     Goals:  Do not skip meals  Try to food log consistently   Get protein with each meal. Can use protein shakes or bars to supplement. Increase water intake to at least 64 oz daily. Gradually increase exercise to goal of 5 days per week for 30 minutes  4877-1032 calories per day. Increase phentermine. GERD (gastroesophageal reflux disease)  - Saw GI. Diet controlled and may improve with weight loss. Marko Kunz was seen today for follow-up. Diagnoses and all orders for this visit:    Obesity, Class II, BMI 35-39.9  -     Comprehensive metabolic panel; Future  -     Lipid Panel with Direct LDL reflex; Future  -     HEMOGLOBIN A1C W/ EAG ESTIMATION;  Future  -     phentermine 30 MG capsule; Take 1 capsule (30 mg total) by mouth every morning    Screening for diabetes mellitus  -     Comprehensive metabolic panel; Future  -     Lipid Panel with Direct LDL reflex; Future  -     HEMOGLOBIN A1C W/ EAG ESTIMATION; Future    Screening for lipid disorders  -     Comprehensive metabolic panel; Future  -     Lipid Panel with Direct LDL reflex; Future  -     HEMOGLOBIN A1C W/ EAG ESTIMATION; Future    Gastroesophageal reflux disease without esophagitis           Follow up in approximately 2 month nurse visit and 4 months with Non-Surgical Physician/Advanced Practitioner. Subjective:   Chief Complaint   Patient presents with    Follow-up     MWM- OD f/u, Goal Wt 140lb, Waist 38in       Patient ID: Lakesha Mckeon  is a 29 y.o. female with excess weight/obesity here to pursue weight management. Patient is pursuing Conservative Program.   Most recent notes and records were reviewed. HPI    Wt Readings from Last 10 Encounters:   10/31/23 82 kg (180 lb 12.8 oz)   05/03/23 78.9 kg (174 lb)   04/28/23 78.9 kg (174 lb)   02/27/23 76.7 kg (169 lb)   02/17/23 76.7 kg (169 lb)   02/17/23 76.4 kg (168 lb 6.4 oz)   11/18/22 74.1 kg (163 lb 6.4 oz)   09/30/22 69.8 kg (153 lb 14.4 oz)   05/03/22 72.5 kg (159 lb 12.8 oz)   04/11/22 73.3 kg (161 lb 11.2 oz)     Taking Phentermine 15 mg daily. No negative side effects. No difference with appetite. Has been food logging. Getting around 720-1300 calories. Saw GI for GERD. Diet and coffee tends to trigger. Previously on [address] [employer] for 7-8 months, was helpful, but insurance would no longer cover. Ideally, she would be interested in going back on an injectable medication.       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    Family History   Problem Relation Age of Onset    No Known Problems Mother     Melanoma Father     No Known Problems Sister     No Known Problems Brother     Diabetes Paternal Grandmother     Hypertension Paternal Grandmother     Diabetes Paternal Grandfather         Review of Systems   HENT:  Negative for sore throat. Respiratory:  Negative for cough and shortness of breath. Cardiovascular:  Negative for chest pain and palpitations. Gastrointestinal:  Negative for abdominal pain, constipation, diarrhea, nausea and vomiting.        + GERD diet controlled   Musculoskeletal:  Negative for arthralgias and back pain. Skin:  Negative for rash. Psychiatric/Behavioral:  Negative for suicidal ideas (or HI). Denies depression and anxiety       Objective:  /70   Pulse 84   Resp 14   Ht 4' 11" (1.499 m)   Wt 82 kg (180 lb 12.8 oz)   BMI 36.52 kg/m²     Physical Exam  Vitals and nursing note reviewed. Constitutional   General appearance: Abnormal.  well developed and obese. Eyes No conjunctival injection. Ears, Nose, Mouth, and Throat Oral mucosa moist.   Pulmonary   Respiratory effort: No increased work of breathing or signs of respiratory distress. Cardiovascular     Examination of extremities for edema and/or varicosities: Normal.  no edema. Abdomen   Abdomen: Abnormal.  The abdomen was obese.     Musculoskeletal   Normal range of motion  Neurological   Gait and station: Normal.    Psychiatric   Orientation to person, place and time: Normal.    Affect: appropriate

## 2023-11-14 ENCOUNTER — TELEPHONE (OUTPATIENT)
Dept: FAMILY MEDICINE CLINIC | Facility: CLINIC | Age: 28
End: 2023-11-14

## 2023-11-27 ENCOUNTER — APPOINTMENT (OUTPATIENT)
Dept: LAB | Facility: MEDICAL CENTER | Age: 28
End: 2023-11-27
Payer: COMMERCIAL

## 2023-11-27 ENCOUNTER — OFFICE VISIT (OUTPATIENT)
Dept: FAMILY MEDICINE CLINIC | Facility: CLINIC | Age: 28
End: 2023-11-27
Payer: COMMERCIAL

## 2023-11-27 VITALS
BODY MASS INDEX: 36.89 KG/M2 | WEIGHT: 183 LBS | HEIGHT: 59 IN | DIASTOLIC BLOOD PRESSURE: 86 MMHG | OXYGEN SATURATION: 99 % | RESPIRATION RATE: 16 BRPM | HEART RATE: 80 BPM | SYSTOLIC BLOOD PRESSURE: 112 MMHG

## 2023-11-27 DIAGNOSIS — Z00.00 ANNUAL PHYSICAL EXAM: Primary | ICD-10-CM

## 2023-11-27 DIAGNOSIS — Z13.220 SCREENING FOR LIPID DISORDERS: ICD-10-CM

## 2023-11-27 DIAGNOSIS — E66.9 OBESITY, CLASS II, BMI 35-39.9: ICD-10-CM

## 2023-11-27 DIAGNOSIS — Z11.59 NEED FOR HEPATITIS C SCREENING TEST: ICD-10-CM

## 2023-11-27 DIAGNOSIS — Z13.1 SCREENING FOR DIABETES MELLITUS: ICD-10-CM

## 2023-11-27 LAB
ALBUMIN SERPL BCP-MCNC: 4.1 G/DL (ref 3.5–5)
ALP SERPL-CCNC: 80 U/L (ref 34–104)
ALT SERPL W P-5'-P-CCNC: 37 U/L (ref 7–52)
ANION GAP SERPL CALCULATED.3IONS-SCNC: 4 MMOL/L
AST SERPL W P-5'-P-CCNC: 16 U/L (ref 13–39)
BILIRUB SERPL-MCNC: 0.45 MG/DL (ref 0.2–1)
BUN SERPL-MCNC: 13 MG/DL (ref 5–25)
CALCIUM SERPL-MCNC: 10.1 MG/DL (ref 8.4–10.2)
CHLORIDE SERPL-SCNC: 105 MMOL/L (ref 96–108)
CHOLEST SERPL-MCNC: 150 MG/DL
CO2 SERPL-SCNC: 29 MMOL/L (ref 21–32)
CREAT SERPL-MCNC: 0.56 MG/DL (ref 0.6–1.3)
EST. AVERAGE GLUCOSE BLD GHB EST-MCNC: 105 MG/DL
GFR SERPL CREATININE-BSD FRML MDRD: 127 ML/MIN/1.73SQ M
GLUCOSE P FAST SERPL-MCNC: 99 MG/DL (ref 65–99)
HBA1C MFR BLD: 5.3 %
HDLC SERPL-MCNC: 40 MG/DL
LDLC SERPL CALC-MCNC: 97 MG/DL (ref 0–100)
POTASSIUM SERPL-SCNC: 4.2 MMOL/L (ref 3.5–5.3)
PROT SERPL-MCNC: 7 G/DL (ref 6.4–8.4)
SODIUM SERPL-SCNC: 138 MMOL/L (ref 135–147)
TRIGL SERPL-MCNC: 66 MG/DL

## 2023-11-27 PROCEDURE — 83036 HEMOGLOBIN GLYCOSYLATED A1C: CPT

## 2023-11-27 PROCEDURE — 36415 COLL VENOUS BLD VENIPUNCTURE: CPT

## 2023-11-27 PROCEDURE — 80053 COMPREHEN METABOLIC PANEL: CPT

## 2023-11-27 PROCEDURE — 99214 OFFICE O/P EST MOD 30 MIN: CPT | Performed by: INTERNAL MEDICINE

## 2023-11-27 PROCEDURE — 80061 LIPID PANEL: CPT

## 2023-11-27 NOTE — ASSESSMENT & PLAN NOTE
Continue with phentermine, follow-up with weight management, she will start regular physical activity.

## 2023-11-27 NOTE — PROGRESS NOTES
Assessment/Plan:    Obesity, Class II, BMI 35-39.9  Continue with phentermine, follow-up with weight management, she will start regular physical activity. Diagnoses and all orders for this visit:    Annual physical exam    Need for hepatitis C screening test  -     Hepatitis C Antibody; Future          Subjective:      Patient ID: Rudolhp Lund is a 29 y.o. female. Patient came today for annual checkup. Following up with weight management. She is currently on phentermine. No family history of breast cancer colon cancer. She is up-to-date on her tetanus shot. The following portions of the patient's history were reviewed and updated as appropriate: allergies, current medications, past family history, past medical history, past social history, past surgical history, and problem list.    Review of Systems   Constitutional:  Negative for activity change, appetite change, chills, fatigue and fever. HENT:  Negative for congestion, ear pain, rhinorrhea and sore throat. Respiratory:  Negative for cough, shortness of breath and wheezing. Cardiovascular:  Negative for chest pain, palpitations and leg swelling. Gastrointestinal:  Negative for abdominal distention, abdominal pain, diarrhea, nausea and vomiting. Genitourinary:  Negative for difficulty urinating, frequency and pelvic pain. Musculoskeletal:  Negative for arthralgias, back pain and neck pain. Skin:  Negative for rash. Neurological:  Negative for dizziness, tremors, weakness, numbness and headaches.          Objective:      /86 (BP Location: Left arm, Patient Position: Sitting, Cuff Size: Large)   Pulse 80   Resp 16   Ht 4' 11" (1.499 m)   Wt 83 kg (183 lb)   SpO2 99%   BMI 36.96 kg/m²     Allergies   Allergen Reactions    Aspirin      Von-willebrand disorder           Current Outpatient Medications:     albuterol (PROVENTIL HFA,VENTOLIN HFA) 90 mcg/act inhaler, as needed, Disp: , Rfl:     cetirizine (ZyrTEC) 10 mg tablet, every 24 hours, Disp: , Rfl:     ciprofloxacin (CILOXAN) 0.3 % ophthalmic solution, 3 drops right ear BID, Disp: 10 mL, Rfl: 0    levonorgestrel (Kyleena) 19.5 MG intrauterine device, 1 each by Intrauterine route once, Disp: , Rfl:     phentermine 30 MG capsule, Take 1 capsule (30 mg total) by mouth every morning, Disp: 30 capsule, Rfl: 1     There are no Patient Instructions on file for this visit. Physical Exam  Constitutional:       General: She is not in acute distress. Appearance: Normal appearance. She is not ill-appearing. HENT:      Nose: No rhinorrhea. Cardiovascular:      Rate and Rhythm: Normal rate and regular rhythm. Heart sounds: No murmur heard. No friction rub. No gallop. Pulmonary:      Effort: No respiratory distress. Breath sounds: No wheezing or rhonchi. Chest:      Chest wall: No tenderness. Abdominal:      General: There is no distension. Palpations: There is no mass. Tenderness: There is no abdominal tenderness. There is no guarding or rebound. Hernia: No hernia is present. Musculoskeletal:         General: No swelling or tenderness. Lymphadenopathy:      Cervical: No cervical adenopathy. Skin:     Coloration: Skin is not jaundiced. Findings: No rash. Neurological:      Mental Status: She is alert and oriented to person, place, and time. Motor: No weakness.       Gait: Gait normal.   Psychiatric:         Mood and Affect: Mood normal.         Behavior: Behavior normal.

## 2023-11-28 ENCOUNTER — TELEPHONE (OUTPATIENT)
Dept: BARIATRICS | Facility: CLINIC | Age: 28
End: 2023-11-28

## 2023-11-28 NOTE — TELEPHONE ENCOUNTER
----- Message from [facility] sent at 11/28/2023  7:50 AM EST -----  Please let the patient know I have reviewed her blood work. HDL (good chol) was somewhat low and will likely improve with weight loss and cardiovascular exercise. Remainder of the blood work was within acceptable range.

## 2023-12-26 ENCOUNTER — TELEPHONE (OUTPATIENT)
Dept: BARIATRICS | Facility: CLINIC | Age: 28
End: 2023-12-26

## 2024-01-05 ENCOUNTER — CLINICAL SUPPORT (OUTPATIENT)
Dept: BARIATRICS | Facility: CLINIC | Age: 29
End: 2024-01-05

## 2024-01-05 VITALS
HEART RATE: 80 BPM | SYSTOLIC BLOOD PRESSURE: 115 MMHG | BODY MASS INDEX: 36.73 KG/M2 | RESPIRATION RATE: 16 BRPM | HEIGHT: 59 IN | DIASTOLIC BLOOD PRESSURE: 85 MMHG | WEIGHT: 182.2 LBS

## 2024-01-05 DIAGNOSIS — R63.5 ABNORMAL WEIGHT GAIN: Primary | ICD-10-CM

## 2024-01-05 PROCEDURE — RECHECK

## 2024-01-05 NOTE — PROGRESS NOTES
Patient last visit weight:  Patient current visit weight:    If you are taking phentermine or other oral weight loss medications, are you experiencing any of the following symptoms:  Headache:   Blurred Vision:   Chest Pain:   Palpitations:  Insomnia:   SPECIFY ORAL MEDICATION AND DOSAGE:  Phentermine  Pt saying the med's are not working , if she can try the wegovy and send to [facility].    If you are taking an injectable medication,  are you experiencing any of the following symptoms:  Bloating:   Nausea:  Vomiting:   Constipation:   Diarrhea:  SPECIFY INJECTABLE MEDICATION AND CURRENT DOSAGE:      Vitals:    Is BP less than 100/60? No  Is BP greater than 140/90? No  Is HR greater than 100? No  **If yes to any of the above, have patient relax and repeat in 5-10 minutes**    Repeat values:    Is BP less than 100/60?  Is BP greater than 140/90?  Is HR greater than 100?  **If values remain outside of ranges above, please consult provider for next steps**     no

## 2024-01-08 ENCOUNTER — TELEPHONE (OUTPATIENT)
Dept: BARIATRICS | Facility: CLINIC | Age: 29
End: 2024-01-08

## 2024-01-08 DIAGNOSIS — E66.9 OBESITY, CLASS II, BMI 35-39.9: Primary | ICD-10-CM

## 2024-01-21 RX ORDER — PHENTERMINE HYDROCHLORIDE 37.5 MG/1
37.5 TABLET ORAL DAILY
Qty: 30 TABLET | Refills: 1 | Status: SHIPPED | OUTPATIENT
Start: 2024-01-21

## 2024-03-14 ENCOUNTER — OFFICE VISIT (OUTPATIENT)
Dept: BARIATRICS | Facility: CLINIC | Age: 29
End: 2024-03-14
Payer: COMMERCIAL

## 2024-03-14 VITALS
RESPIRATION RATE: 16 BRPM | SYSTOLIC BLOOD PRESSURE: 106 MMHG | WEIGHT: 176.2 LBS | DIASTOLIC BLOOD PRESSURE: 62 MMHG | TEMPERATURE: 98.2 F | BODY MASS INDEX: 35.52 KG/M2 | HEART RATE: 80 BPM | HEIGHT: 59 IN

## 2024-03-14 DIAGNOSIS — E66.9 OBESITY, CLASS II, BMI 35-39.9: Primary | ICD-10-CM

## 2024-03-14 DIAGNOSIS — Z79.899 MEDICATION MANAGEMENT: ICD-10-CM

## 2024-03-14 DIAGNOSIS — K21.9 GASTROESOPHAGEAL REFLUX DISEASE WITHOUT ESOPHAGITIS: ICD-10-CM

## 2024-03-14 LAB — SL AMB POCT URINE HCG: NORMAL

## 2024-03-14 PROCEDURE — 99214 OFFICE O/P EST MOD 30 MIN: CPT | Performed by: NURSE PRACTITIONER

## 2024-03-14 PROCEDURE — 81025 URINE PREGNANCY TEST: CPT | Performed by: NURSE PRACTITIONER

## 2024-03-14 RX ORDER — TOPIRAMATE 25 MG/1
25 TABLET ORAL 2 TIMES DAILY
Qty: 60 TABLET | Refills: 3 | Status: SHIPPED | OUTPATIENT
Start: 2024-03-14

## 2024-03-14 RX ORDER — PHENTERMINE HYDROCHLORIDE 37.5 MG/1
37.5 TABLET ORAL DAILY
Qty: 30 TABLET | Refills: 1 | Status: SHIPPED | OUTPATIENT
Start: 2024-03-14

## 2024-03-14 NOTE — ASSESSMENT & PLAN NOTE
-Patient is pursuing Conservative Program. Previously saw dietician for bundles.   -Initial weight loss goal of 5-10% weight loss for improved health  - Previously on Saxenda and did well, but later insurance would not cover it.   - EKG 3/12/2021: NSR, sinus arrhythmia.   - Denies history of seizures, kidney stones, or glaucoma.   - Patient denies personal history of pancreatitis. Patient also denies personal and family history of thyroid cancer and multiple endocrine neoplasia type 2 (MEN 2 tumor).   - Has IUD.  - Continue Phentermine 37.5 mg daily, tolerating well and helping with appetite. Restarted August 2023 at 180 lbs. PDMP queried, no red flags.  - Discussed starting Wegovy or Zepbound, but she does not think they are covered by her insurance.  Also reviewed adding Topamax to phentermine to mimic Qsymia.  She was agreeable to starting Topamax.  -Medication and Topamax contract signed March 14, 2024.  -In office urine pregnancy test was negative.  -Start Topamax 25 mg twice a day, start weight 176.1 pounds.  Potential side effects of Topamax (topiramate) include: numbness or tingling, fatigue, depression/anxiety, changes in taste, abdominal upset/heartburn, trouble sleeping, and may reduce sweating - stay well hydrated to avoid overheating.  Continue contraception while taking Topamax due to the risk for teratogenicity.  Reviewed that if she wants to conceive in the future, she should be off of all medications for weight loss for at least 3 months prior to trying to conceive.  - Labs reviewed: A1c, lipid panel, and CMP November 27, 2023.  HDL somewhat low, which will likely improve with weight loss.  Remainder of the blood work was within acceptable range.        Initial: 181.7 lbs  Last visit: 180.8 lbs BMI 36.62  Current: 176.1 pounds BMI 35.59  Change: -6 lbs (-5 lbs since the last office visit)  Goal: 140 lbs     Goals:  Do not skip meals  Try to food log consistently   Get protein with each meal. Can use  protein shakes or bars to supplement.  Increase water intake to at least 64 oz daily.   3257-1551 calories per day.   Keep up the great work with exercise.  Continue phentermine.  Start Topamax.

## 2024-03-14 NOTE — PROGRESS NOTES
Assessment/Plan:     Obesity, Class II, BMI 35-39.9  -Patient is pursuing Conservative Program. Previously saw dietician for bundles.   -Initial weight loss goal of 5-10% weight loss for improved health  - Previously on Saxenda and did well, but later insurance would not cover it.   - EKG 3/12/2021: NSR, sinus arrhythmia.   - Denies history of seizures, kidney stones, or glaucoma.   - Patient denies personal history of pancreatitis. Patient also denies personal and family history of thyroid cancer and multiple endocrine neoplasia type 2 (MEN 2 tumor).   - Has IUD.  - Continue Phentermine 37.5 mg daily, tolerating well and helping with appetite. Restarted August 2023 at 180 lbs. PDMP queried, no red flags.  - Discussed starting Wegovy or Zepbound, but she does not think they are covered by her insurance.  Also reviewed adding Topamax to phentermine to mimic Qsymia.  She was agreeable to starting Topamax.  -Medication and Topamax contract signed March 14, 2024.  -In office urine pregnancy test was negative.  -Start Topamax 25 mg twice a day, start weight 176.1 pounds.  Potential side effects of Topamax (topiramate) include: numbness or tingling, fatigue, depression/anxiety, changes in taste, abdominal upset/heartburn, trouble sleeping, and may reduce sweating - stay well hydrated to avoid overheating.  Continue contraception while taking Topamax due to the risk for teratogenicity.  Reviewed that if she wants to conceive in the future, she should be off of all medications for weight loss for at least 3 months prior to trying to conceive.  - Labs reviewed: A1c, lipid panel, and CMP November 27, 2023.  HDL somewhat low, which will likely improve with weight loss.  Remainder of the blood work was within acceptable range.        Initial: 181.7 lbs  Last visit: 180.8 lbs BMI 36.62  Current: 176.1 pounds BMI 35.59  Change: -6 lbs (-5 lbs since the last office visit)  Goal: 140 lbs     Goals:  Do not skip meals  Try to food  log consistently   Get protein with each meal. Can use protein shakes or bars to supplement.  Increase water intake to at least 64 oz daily.   7013-0124 calories per day.   Keep up the great work with exercise.  Continue phentermine.  Start Topamax.      GERD (gastroesophageal reflux disease)  Diet controlled.           Marni was seen today for follow-up.    Diagnoses and all orders for this visit:    Obesity, Class II, BMI 35-39.9  -     phentermine (ADIPEX-P) 37.5 MG tablet; Take 1 tablet (37.5 mg total) by mouth in the morning  -     topiramate (Topamax) 25 mg tablet; Take 1 tablet (25 mg total) by mouth 2 (two) times a day    Medication management  -     POCT urine HCG    Gastroesophageal reflux disease without esophagitis           Follow up in approximately 2 month nurse visit and 4 months with Non-Surgical Physician/Advanced Practitioner.    Subjective:   Chief Complaint   Patient presents with    Follow-up     MWM- 4mth F/u       Patient ID: Marni Wilcox  is a 28 y.o. female with excess weight/obesity here to pursue weight management.  Patient is pursuing Conservative Program.   Most recent notes and records were reviewed.    HPI    Wt Readings from Last 10 Encounters:   03/14/24 79.9 kg (176 lb 3.2 oz)   01/05/24 82.6 kg (182 lb 3.2 oz)   11/27/23 83 kg (183 lb)   10/31/23 82 kg (180 lb 12.8 oz)   05/03/23 78.9 kg (174 lb)   04/28/23 78.9 kg (174 lb)   02/27/23 76.7 kg (169 lb)   02/17/23 76.7 kg (169 lb)   02/17/23 76.4 kg (168 lb 6.4 oz)   11/18/22 74.1 kg (163 lb 6.4 oz)     Taking Phentermine 37.5 mg daily. No negative side effects. Helping with appetite.     Has been food logging. Getting around 900-1000 calories.       Previously on Saxenda for 7-8 months, was helpful, but insurance would no longer cover.       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    Family History   Problem Relation Age of Onset    No Known Problems Mother     Melanoma Father     No Known Problems Sister     No Known Problems Brother     Diabetes Paternal Grandmother     Hypertension Paternal Grandmother     Diabetes Paternal Grandfather         Review of Systems   HENT:  Negative for sore throat.    Respiratory:  Negative for cough and shortness of breath.    Cardiovascular:  Negative for chest pain and palpitations.   Gastrointestinal:  Negative for abdominal pain, constipation, diarrhea, nausea and vomiting.        + GERD diet controlled   Musculoskeletal:  Negative for arthralgias and back pain.   Skin:  Negative for rash.   Psychiatric/Behavioral:  Negative for suicidal ideas (or HI).         Denies depression and anxiety       Objective:  /62   Pulse 80   Temp 98.2 °F (36.8 °C)   Resp 16   Ht 4' 11\" (1.499 m)   Wt 79.9 kg (176 lb 3.2 oz)   BMI 35.59 kg/m²     Physical Exam  Vitals and nursing note reviewed.        Constitutional   General appearance: Abnormal.  well developed and obese.   Eyes No conjunctival injection.   Ears, Nose, Mouth, and Throat Oral mucosa moist.   Pulmonary   Respiratory effort: No increased work of breathing or signs of respiratory distress.     Cardiovascular     Examination of extremities for edema and/or varicosities: Normal.  no edema.   Abdomen   Abdomen: Abnormal.  The abdomen was obese.    Musculoskeletal   Normal range of motion  Neurological   Gait and station: Normal.  "   Psychiatric   Orientation to person, place and time: Normal.    Affect: appropriate

## 2024-04-05 ENCOUNTER — PATIENT MESSAGE (OUTPATIENT)
Dept: BARIATRICS | Facility: CLINIC | Age: 29
End: 2024-04-05

## 2024-04-05 DIAGNOSIS — E66.9 OBESITY, CLASS II, BMI 35-39.9: Primary | ICD-10-CM

## 2024-04-05 DIAGNOSIS — E66.9 OBESITY, CLASS II, BMI 35-39.9: ICD-10-CM

## 2024-04-05 RX ORDER — TOPIRAMATE 25 MG/1
25 TABLET ORAL 2 TIMES DAILY
Qty: 180 TABLET | Refills: 1 | Status: SHIPPED | OUTPATIENT
Start: 2024-04-05

## 2024-04-09 RX ORDER — TIRZEPATIDE 2.5 MG/.5ML
2.5 INJECTION, SOLUTION SUBCUTANEOUS WEEKLY
Qty: 2 ML | Refills: 0 | Status: SHIPPED | OUTPATIENT
Start: 2024-04-09 | End: 2024-05-07

## 2024-04-10 ENCOUNTER — TELEPHONE (OUTPATIENT)
Dept: BARIATRICS | Facility: CLINIC | Age: 29
End: 2024-04-10

## 2024-04-12 ENCOUNTER — VBI (OUTPATIENT)
Dept: ADMINISTRATIVE | Facility: OTHER | Age: 29
End: 2024-04-12

## 2024-04-12 NOTE — TELEPHONE ENCOUNTER
PA for Zepbound    Submitted via    []CMM-KEY   [x]Surescripts-Case ID #   []Faxed to plan   []Other website   []Phone call Case ID #     Office notes sent, clinical questions answered. Awaiting determination    Turnaround time for your insurance to make a decision on your Prior Authorization can take 7-21 business days.

## 2024-06-12 DIAGNOSIS — E66.9 OBESITY, CLASS II, BMI 35-39.9: ICD-10-CM

## 2024-06-13 RX ORDER — PHENTERMINE HYDROCHLORIDE 37.5 MG/1
37.5 TABLET ORAL DAILY
Qty: 30 TABLET | Refills: 1 | Status: SHIPPED | OUTPATIENT
Start: 2024-06-13

## 2024-06-14 ENCOUNTER — TELEPHONE (OUTPATIENT)
Age: 29
End: 2024-06-14

## 2024-06-14 NOTE — TELEPHONE ENCOUNTER
PA for PHENTERMINE    Submitted via    []CMM-KEY   [x]Surescripts-Case ID #   []Faxed to plan   []Other website   []Phone call Case ID #     Office notes sent, clinical questions answered. Awaiting determination    Turnaround time for your insurance to make a decision on your Prior Authorization can take 7-21 business days.

## 2024-06-17 NOTE — TELEPHONE ENCOUNTER
PA for PHENTERMINE Denied    Reason:(Screenshot if applicable)        Message sent to office clinical pool Yes    Denial letter scanned into Media Yes    Appeal started No ( Provider will need to decide if appeal is warranted and send clinical documentation to PA team for initiation.)    **Please follow up with your patient regarding denial and next steps**

## 2024-08-05 ENCOUNTER — TELEPHONE (OUTPATIENT)
Dept: BARIATRICS | Facility: CLINIC | Age: 29
End: 2024-08-05

## 2024-08-13 ENCOUNTER — VBI (OUTPATIENT)
Dept: ADMINISTRATIVE | Facility: OTHER | Age: 29
End: 2024-08-13

## 2024-08-13 NOTE — TELEPHONE ENCOUNTER
08/13/24 9:46 AM     Chart reviewed for Pap Smear (HPV) aka Cervical Cancer Screening ; nothing is submitted to the patient's insurance at this time.     SATISH VELEZ MA   [facility]

## 2024-11-11 ENCOUNTER — OFFICE VISIT (OUTPATIENT)
Dept: FAMILY MEDICINE CLINIC | Facility: CLINIC | Age: 29
End: 2024-11-11
Payer: COMMERCIAL

## 2024-11-11 VITALS
BODY MASS INDEX: 36.57 KG/M2 | WEIGHT: 181.4 LBS | SYSTOLIC BLOOD PRESSURE: 122 MMHG | OXYGEN SATURATION: 98 % | DIASTOLIC BLOOD PRESSURE: 70 MMHG | HEIGHT: 59 IN | HEART RATE: 82 BPM

## 2024-11-11 DIAGNOSIS — Z00.00 ANNUAL PHYSICAL EXAM: Primary | ICD-10-CM

## 2024-11-11 DIAGNOSIS — Z13.0 SCREENING FOR DEFICIENCY ANEMIA: ICD-10-CM

## 2024-11-11 DIAGNOSIS — Z13.89 SCREENING FOR HEMATURIA OR PROTEINURIA: ICD-10-CM

## 2024-11-11 DIAGNOSIS — K51.811 OTHER ULCERATIVE COLITIS WITH RECTAL BLEEDING (HCC): ICD-10-CM

## 2024-11-11 DIAGNOSIS — Z13.29 SCREENING FOR HYPOTHYROIDISM: ICD-10-CM

## 2024-11-11 DIAGNOSIS — R10.13 EPIGASTRIC PAIN: ICD-10-CM

## 2024-11-11 DIAGNOSIS — D68.00 VON WILLEBRAND DISEASE (HCC): ICD-10-CM

## 2024-11-11 DIAGNOSIS — Z13.220 SCREENING FOR HYPERLIPIDEMIA: ICD-10-CM

## 2024-11-11 DIAGNOSIS — E66.812 OBESITY, CLASS II, BMI 35-39.9: ICD-10-CM

## 2024-11-11 PROBLEM — R63.5 WEIGHT GAIN: Status: RESOLVED | Noted: 2021-03-08 | Resolved: 2024-11-11

## 2024-11-11 PROCEDURE — 99395 PREV VISIT EST AGE 18-39: CPT | Performed by: INTERNAL MEDICINE

## 2024-11-11 PROCEDURE — 99214 OFFICE O/P EST MOD 30 MIN: CPT | Performed by: INTERNAL MEDICINE

## 2024-11-11 RX ORDER — PHENTERMINE HYDROCHLORIDE 37.5 MG/1
37.5 TABLET ORAL DAILY
Qty: 30 TABLET | Refills: 0 | Status: SHIPPED | OUTPATIENT
Start: 2024-11-11

## 2024-11-11 RX ORDER — OMEPRAZOLE 40 MG/1
40 CAPSULE, DELAYED RELEASE ORAL DAILY
Qty: 90 CAPSULE | Refills: 1 | Status: SHIPPED | OUTPATIENT
Start: 2024-11-11

## 2024-11-11 NOTE — ASSESSMENT & PLAN NOTE
Restart phentermine 37.5 mg daily, she was tolerating medication well in the past.  Will collect urine drug screen.

## 2024-11-11 NOTE — PROGRESS NOTES
Assessment/Plan:    Epigastric pain  Restart omeprazole 40 mg daily.  She will update me on the symptoms in few weeks.    Obesity, Class II, BMI 35-39.9  Restart phentermine 37.5 mg daily, she was tolerating medication well in the past.  Will collect urine drug screen.    Other ulcerative colitis with rectal bleeding (HCC)  He does have history of ulcerative colitis, continue follow-up with GI.       Diagnoses and all orders for this visit:    Annual physical exam    Obesity, Class II, BMI 35-39.9  -     phentermine (ADIPEX-P) 37.5 MG tablet; Take 1 tablet (37.5 mg total) by mouth in the morning  -     Comprehensive metabolic panel; Future  -     Hemoglobin A1C; Future  -     [facility] All Prescribed Meds and Special Instructions  -     Amphetamines, Methamphetamines  -     Butalbital  -     Phenobarbital  -     Secobarbital  -     Alprazolam  -     Clonazepam  -     Diazepam  -     Lorazepam  -     Gabapentin  -     Pregabalin  -     Cocaine  -     Heroin  -     Buprenorphine  -     Levorphanol  -     Meperidine  -     Naltrexone  -     Fentanyl  -     Methadone  -     Oxycodone  -     Tapentadol  -     THC  -     Tramadol  -     Codeine, Hydrocodone, Hydropmorphone, Morphine  -     Bath Salts  -     Ethyl Glucuronide/Ethyl Sulfate  -     Kratom  -     Spice  -     Methylphenidate  -     Phentermine  -     Validity Oxidant  -     Validity Creatinine  -     Validity pH  -     Validity Specific  -     Xylazine Definitive Test    Von Willebrand disease (HCC)    Other ulcerative colitis with rectal bleeding (HCC)    Screening for hypothyroidism  -     TSH, 3rd generation with Free T4 reflex; Future    Screening for deficiency anemia  -     CBC and differential; Future    Screening for hyperlipidemia  -     Lipid panel; Future    Screening for hematuria or proteinuria  -     UA (URINE) with reflex to Scope; Future    Epigastric pain  -     omeprazole (PriLOSEC) 40 MG capsule; Take 1 capsule (40 mg total) by mouth  "daily          Subjective:      Patient ID: Marni Wilcox is a 29 y.o. female.    Patient came today for annual checkup and to follow-up on some current medical concerns.    No family history of breast cancer colon cancer.  She is up-to-date on her tetanus shot.  She is to follow-up with weight management.  She will start to exercise regularly, continue with low-carb diet.  Vital signs are acceptable except of elevated BMI.          The following portions of the patient's history were reviewed and updated as appropriate: allergies, current medications, past family history, past medical history, past social history, past surgical history, and problem list.    Review of Systems   Constitutional:  Negative for activity change, appetite change, chills, fatigue and fever.   HENT:  Negative for congestion, ear pain, rhinorrhea and sore throat.    Respiratory:  Negative for cough, shortness of breath and wheezing.    Cardiovascular:  Negative for chest pain, palpitations and leg swelling.   Gastrointestinal:  Positive for abdominal pain. Negative for abdominal distention, diarrhea, nausea and vomiting.   Genitourinary:  Negative for difficulty urinating, frequency and pelvic pain.   Musculoskeletal:  Negative for arthralgias, back pain and neck pain.   Skin:  Negative for rash.   Neurological:  Negative for dizziness, tremors, weakness, numbness and headaches.         Objective:      /70   Pulse 82   Ht 4' 11\" (1.499 m)   Wt 82.3 kg (181 lb 6.4 oz)   LMP 10/15/2024 (Approximate)   SpO2 98%   BMI 36.64 kg/m²     Allergies   Allergen Reactions    Aspirin      Von-willebrand disorder           Current Outpatient Medications:     omeprazole (PriLOSEC) 40 MG capsule, Take 1 capsule (40 mg total) by mouth daily, Disp: 90 capsule, Rfl: 1    phentermine (ADIPEX-P) 37.5 MG tablet, Take 1 tablet (37.5 mg total) by mouth in the morning, Disp: 30 tablet, Rfl: 0    levonorgestrel (Kyleena) 19.5 MG intrauterine device, 1 each " by Intrauterine route once, Disp: , Rfl:      Patient Instructions           Physical Exam  Constitutional:       General: She is not in acute distress.     Appearance: She is not ill-appearing or toxic-appearing.   Cardiovascular:      Heart sounds: No murmur heard.     No gallop.   Pulmonary:      Effort: No respiratory distress.      Breath sounds: No wheezing or rales.   Psychiatric:         Mood and Affect: Mood normal.

## 2024-11-12 ENCOUNTER — TELEPHONE (OUTPATIENT)
Age: 29
End: 2024-11-12

## 2024-11-12 NOTE — TELEPHONE ENCOUNTER
Hospital Medicine Daily Progress Note    Date of Service  6/15/2021    Chief Complaint  89 y.o. female admitted 6/12/2021 with focal neuro findings    Hospital Course  89yo PMHx HTN, aortic stenosis.   presetning with L hemiplegia and aphasia.  LKW was >24hrs previous.  CTA showing R M1 occlusion.  treated with mechanical thrombectomy TICI 2b on 6/12    Interval Problem Update  Pt feels thirsty, has no other complaints  Up to chair    Consultants/Specialty  Neuro    Code Status  DNAR/DNI    Disposition  Rehab    Review of Systems  Review of Systems   Constitutional: Negative for chills and fever.   HENT: Negative for nosebleeds and sore throat.    Eyes: Negative for blurred vision and double vision.   Respiratory: Negative for cough and shortness of breath.    Cardiovascular: Negative for chest pain, palpitations and leg swelling.   Gastrointestinal: Negative for abdominal pain, diarrhea, nausea and vomiting.   Genitourinary: Negative for dysuria and urgency.   Musculoskeletal: Negative for back pain.   Skin: Negative for rash.   Neurological: Positive for focal weakness. Negative for dizziness, loss of consciousness and headaches.        Physical Exam  Temp:  [36.5 °C (97.7 °F)-36.9 °C (98.5 °F)] 36.5 °C (97.7 °F)  Pulse:  [55-91] 55  Resp:  [22-39] 34  BP: (128-148)/(59-87) 138/59  SpO2:  [92 %-98 %] 94 %    Physical Exam  Vitals reviewed.   Constitutional:       General: She is not in acute distress.     Appearance: She is well-developed. She is not diaphoretic.   HENT:      Head: Normocephalic and atraumatic.   Neck:      Vascular: No JVD.   Cardiovascular:      Rate and Rhythm: Normal rate and regular rhythm.      Heart sounds: Murmur heard.     Pulmonary:      Effort: Pulmonary effort is normal. No respiratory distress.      Breath sounds: No stridor. No wheezing or rales.   Abdominal:      Palpations: Abdomen is soft.      Tenderness: There is no abdominal tenderness. There is no guarding or rebound.  PA for phentermine 37.5mg  DENIED    Reason:(Screenshot if applicable)        Message sent to office clinical pool Yes    Denial letter scanned into Media No awaiting fax    Appeal started No (Provider will need to decide if appeal is warranted and send clinical documentation to Prior Authorization Team for initiation.)    **Please follow up with your patient regarding denial and next steps**      Musculoskeletal:         General: No tenderness.      Right lower leg: No edema.      Left lower leg: No edema.   Skin:     General: Skin is warm and dry.      Findings: No rash.      Comments: bruising   Neurological:      Mental Status: She is alert and oriented to person, place, and time.      Comments: L side neglect  Alert and oriented, interactive  4/5 B lowers  3/5 L upper   Psychiatric:         Thought Content: Thought content normal.         Fluids    Intake/Output Summary (Last 24 hours) at 6/15/2021 0542  Last data filed at 6/15/2021 0400  Gross per 24 hour   Intake 392.5 ml   Output 46 ml   Net 346.5 ml       Laboratory  Recent Labs     06/12/21  1831 06/13/21  1014 06/14/21  0330   WBC 14.3* 11.6* 10.3   RBC 3.63* 3.62* 3.16*   HEMOGLOBIN 10.9* 11.0* 9.6*   HEMATOCRIT 33.8* 33.3* 29.1*   MCV 93.1 92.0 92.1   MCH 30.0 30.4 30.4   MCHC 32.2* 33.0* 33.0*   RDW 53.3* 53.3* 53.4*   PLATELETCT 145* 173 151*   MPV 10.8 10.3 11.3     Recent Labs     06/12/21 1831 06/13/21  1014 06/14/21  0330   SODIUM 138 136 136   POTASSIUM 3.8 3.7 3.8   CHLORIDE 106 106 104   CO2 20 19* 22   GLUCOSE 99 111* 94   BUN 20 17 22   CREATININE 0.84 0.78 0.78   CALCIUM 8.2* 8.4* 8.8     Recent Labs     06/12/21  1831   APTT 35.9   INR 1.28*         Recent Labs     06/13/21  0135   TRIGLYCERIDE 49   HDL 48   LDL 69       Imaging  MR-BRAIN-W/O   Final Result      1.  Moderate cerebral atrophy expected for the patient's age.   2.  Moderately extensive areas of acute infarction in the right MCA territory as described above. Small foci of hemorrhagic transformation at the right posterior frontal-parietal region. The most prominent focus measuring about 9 mm would appear to    correspond with the small focus of hemorrhage seen on the CT prior to the thrombectomy procedure.   3.  Moderate supratentorial white matter disease most consistent with microvascular ischemic change.   4.  Old lacunar infarct right cerebellar hemisphere  superiorly.   5.  Subcentimeter lacunar size acute infarct at the inferior aspect of the right cerebellar hemisphere.   6.  Variant anatomy with a diminutive vertebrobasilar system and apparent carotid origin of both posterior cerebral arteries.   7.  6 mm aneurysm at the right MCA genu concordant with catheter angiography.      DX-CHEST-PORTABLE (1 VIEW)   Final Result      Cardiomegaly. No focal consolidation or pleural effusions.      IR-THROMBO MECHANICAL ARTERY,INIT   Final Result         89-year-old patient who presented with acute onset of left arm and leg weakness was found to have a right M1 MCA occlusion on CT angiography with a favorable perfusion profile underwent emergent right middle cerebral artery mechanical thrombectomy as    described above. Initial angiogram demonstrated complete occlusion of the right M1 segment.      The final angiogram showed partial recanalization of the right MCA with improved flow to the superior and inferior division branches. Final recanalization score: TICI 2A      Final angiogram also demonstrated an aneurysm measuring 7 x 8 mm involving the right M1 bifurcation, which was likely the source of the thrombus.         CT-CTA NECK WITH & W/O-POST PROCESSING   Final Result      1.  Mild calcified plaque in the left carotid bulb and proximal left internal carotid artery with less than 50% stenosis.      2.  Soft tissue density in the mediastinum and hilum could represent lymphadenopathy which is incompletely visualized/evaluated on this neck CTA. Recommend follow-up chest CT.      CT-CTA HEAD WITH & W/O-POST PROCESS   Final Result      Right middle cerebral artery M1 occlusion.      Neuro IR is aware of findings.         CT-CEREBRAL PERFUSION ANALYSIS   Final Result      1.  Cerebral blood flow less than 30% likely representing completed infarct = 0 mL.      2.  T Max more than 6 seconds likely representing combination of completed infarct and ischemia = 105 mL.      3.   Mismatched volume likely representing ischemic brain/penumbra = 105 ml      4.  Please note that the cerebral perfusion was performed on the limited brain tissue around the basal ganglia region. Infarct/ischemia outside the CT perfusion sections can be missed in this study.      CT-HEAD W/O   Final Result      1.  Hyperdense right MCA with right temporal and inferior frontoparietal lobe edema with slight loss of gray-white matter differentiation consistent with infarct with subtle area of increased attenuation at the junction of the right frontoparietal lobe    could represent some petechial hemorrhage.      2.  This was discussed with Dr. Baker at 6:55 PM who had already talked to neurology and the neurointerventionist on call.      DX-ESOPHAGUS - [id number]    (Results Pending)        Assessment/Plan  * Acute right MCA stroke (HCC)- (present on admission)  Assessment & Plan  Patient noted to have Right sided M1 occlusion  Status post mechanical thrombectomy on 6/12  Lipitor 40 mg po qhs   Neuro check Q4   Hold antiplatelets and DVT prophylaxis due to hemorrhagic transformation and will need neurology approval before starting it  PT OT eval    Age-related physical debility  Assessment & Plan  PT/OT eval     Hypertension- (present on admission)  Assessment & Plan  Uncontrolled  Start Norvasc 5mg   IV as needed medications have been ordered  Neurology is recommending neuro blood pressure control 120 - 180     Aortic stenosis- (present on admission)  Assessment & Plan  Reports hx of aortic stenosis, monitor cardiac output especially during thrombectomy  TTE          VTE prophylaxis: SCDs

## 2024-11-12 NOTE — TELEPHONE ENCOUNTER
PA for phentermine 37.5mg SUBMITTED to [facility]    via    []CMM-KEY:    [x]Surescripts-Case ID # [id number]   []Availity-Auth ID #  NDC #    []Faxed to plan   []Other website    []Phone call Case ID #      [x]PA sent as URGENT    All office notes, labs and other pertaining documents and studies sent. Clinical questions answered. Awaiting determination from insurance company.     Turnaround time for your insurance to make a decision on your Prior Authorization can take 7-21 business days.

## 2024-11-13 LAB

## 2024-11-15 ENCOUNTER — APPOINTMENT (OUTPATIENT)
Dept: LAB | Facility: MEDICAL CENTER | Age: 29
End: 2024-11-15
Payer: COMMERCIAL

## 2024-11-15 DIAGNOSIS — Z13.220 SCREENING FOR HYPERLIPIDEMIA: ICD-10-CM

## 2024-11-15 DIAGNOSIS — Z13.29 SCREENING FOR HYPOTHYROIDISM: ICD-10-CM

## 2024-11-15 DIAGNOSIS — Z13.89 SCREENING FOR HEMATURIA OR PROTEINURIA: ICD-10-CM

## 2024-11-15 DIAGNOSIS — Z11.59 NEED FOR HEPATITIS C SCREENING TEST: ICD-10-CM

## 2024-11-15 DIAGNOSIS — E66.812 OBESITY, CLASS II, BMI 35-39.9: ICD-10-CM

## 2024-11-15 DIAGNOSIS — Z13.0 SCREENING FOR DEFICIENCY ANEMIA: ICD-10-CM

## 2024-11-15 LAB
ALBUMIN SERPL BCG-MCNC: 4.1 G/DL (ref 3.5–5)
ALP SERPL-CCNC: 75 U/L (ref 34–104)
ALT SERPL W P-5'-P-CCNC: 19 U/L (ref 7–52)
AMORPH URATE CRY URNS QL MICRO: ABNORMAL
ANION GAP SERPL CALCULATED.3IONS-SCNC: 9 MMOL/L (ref 4–13)
AST SERPL W P-5'-P-CCNC: 17 U/L (ref 13–39)
BACTERIA UR QL AUTO: ABNORMAL /HPF
BASOPHILS # BLD AUTO: 0.03 THOUSANDS/ÂΜL (ref 0–0.1)
BASOPHILS NFR BLD AUTO: 1 % (ref 0–1)
BILIRUB SERPL-MCNC: 0.41 MG/DL (ref 0.2–1)
BILIRUB UR QL STRIP: NEGATIVE
BUN SERPL-MCNC: 15 MG/DL (ref 5–25)
CALCIUM SERPL-MCNC: 8.9 MG/DL (ref 8.4–10.2)
CHLORIDE SERPL-SCNC: 104 MMOL/L (ref 96–108)
CHOLEST SERPL-MCNC: 156 MG/DL (ref ?–200)
CLARITY UR: ABNORMAL
CO2 SERPL-SCNC: 28 MMOL/L (ref 21–32)
COLOR UR: ABNORMAL
CREAT SERPL-MCNC: 0.65 MG/DL (ref 0.6–1.3)
EOSINOPHIL # BLD AUTO: 0.13 THOUSAND/ÂΜL (ref 0–0.61)
EOSINOPHIL NFR BLD AUTO: 2 % (ref 0–6)
ERYTHROCYTE [DISTWIDTH] IN BLOOD BY AUTOMATED COUNT: 12.1 % (ref 11.6–15.1)
EST. AVERAGE GLUCOSE BLD GHB EST-MCNC: 103 MG/DL
GFR SERPL CREATININE-BSD FRML MDRD: 120 ML/MIN/1.73SQ M
GLUCOSE P FAST SERPL-MCNC: 98 MG/DL (ref 65–99)
GLUCOSE UR STRIP-MCNC: NEGATIVE MG/DL
HBA1C MFR BLD: 5.2 %
HCT VFR BLD AUTO: 42.4 % (ref 34.8–46.1)
HCV AB SER QL: NORMAL
HDLC SERPL-MCNC: 39 MG/DL
HGB BLD-MCNC: 13.9 G/DL (ref 11.5–15.4)
HGB UR QL STRIP.AUTO: ABNORMAL
IMM GRANULOCYTES # BLD AUTO: 0.01 THOUSAND/UL (ref 0–0.2)
IMM GRANULOCYTES NFR BLD AUTO: 0 % (ref 0–2)
KETONES UR STRIP-MCNC: NEGATIVE MG/DL
LDLC SERPL CALC-MCNC: 107 MG/DL (ref 0–100)
LEUKOCYTE ESTERASE UR QL STRIP: NEGATIVE
LYMPHOCYTES # BLD AUTO: 1.96 THOUSANDS/ÂΜL (ref 0.6–4.47)
LYMPHOCYTES NFR BLD AUTO: 32 % (ref 14–44)
MCH RBC QN AUTO: 28.8 PG (ref 26.8–34.3)
MCHC RBC AUTO-ENTMCNC: 32.8 G/DL (ref 31.4–37.4)
MCV RBC AUTO: 88 FL (ref 82–98)
MONOCYTES # BLD AUTO: 0.4 THOUSAND/ÂΜL (ref 0.17–1.22)
MONOCYTES NFR BLD AUTO: 7 % (ref 4–12)
MUCOUS THREADS UR QL AUTO: ABNORMAL
NEUTROPHILS # BLD AUTO: 3.6 THOUSANDS/ÂΜL (ref 1.85–7.62)
NEUTS SEG NFR BLD AUTO: 58 % (ref 43–75)
NITRITE UR QL STRIP: NEGATIVE
NON-SQ EPI CELLS URNS QL MICRO: ABNORMAL /HPF
NONHDLC SERPL-MCNC: 117 MG/DL
NRBC BLD AUTO-RTO: 0 /100 WBCS
PH UR STRIP.AUTO: 6 [PH]
PLATELET # BLD AUTO: 238 THOUSANDS/UL (ref 149–390)
PMV BLD AUTO: 9.7 FL (ref 8.9–12.7)
POTASSIUM SERPL-SCNC: 4.3 MMOL/L (ref 3.5–5.3)
PROT SERPL-MCNC: 7.3 G/DL (ref 6.4–8.4)
PROT UR STRIP-MCNC: ABNORMAL MG/DL
RBC # BLD AUTO: 4.82 MILLION/UL (ref 3.81–5.12)
RBC #/AREA URNS AUTO: ABNORMAL /HPF
SODIUM SERPL-SCNC: 141 MMOL/L (ref 135–147)
SP GR UR STRIP.AUTO: 1.03 (ref 1–1.03)
TRIGL SERPL-MCNC: 50 MG/DL (ref ?–150)
TSH SERPL DL<=0.05 MIU/L-ACNC: 1.11 UIU/ML (ref 0.45–4.5)
UROBILINOGEN UR STRIP-ACNC: <2 MG/DL
WBC # BLD AUTO: 6.13 THOUSAND/UL (ref 4.31–10.16)
WBC #/AREA URNS AUTO: ABNORMAL /HPF

## 2024-11-15 PROCEDURE — 36415 COLL VENOUS BLD VENIPUNCTURE: CPT

## 2024-11-15 PROCEDURE — 81001 URINALYSIS AUTO W/SCOPE: CPT

## 2024-11-15 PROCEDURE — 80061 LIPID PANEL: CPT

## 2024-11-15 PROCEDURE — 83036 HEMOGLOBIN GLYCOSYLATED A1C: CPT

## 2024-11-15 PROCEDURE — 84443 ASSAY THYROID STIM HORMONE: CPT

## 2024-11-15 PROCEDURE — 86803 HEPATITIS C AB TEST: CPT

## 2024-11-15 PROCEDURE — 85025 COMPLETE CBC W/AUTO DIFF WBC: CPT

## 2024-11-15 PROCEDURE — 80053 COMPREHEN METABOLIC PANEL: CPT

## 2024-11-18 ENCOUNTER — RESULTS FOLLOW-UP (OUTPATIENT)
Dept: FAMILY MEDICINE CLINIC | Facility: CLINIC | Age: 29
End: 2024-11-18

## 2024-12-29 DIAGNOSIS — E66.812 OBESITY, CLASS II, BMI 35-39.9: ICD-10-CM

## 2024-12-31 RX ORDER — PHENTERMINE HYDROCHLORIDE 37.5 MG/1
37.5 TABLET ORAL DAILY
Qty: 30 TABLET | Refills: 0 | Status: SHIPPED | OUTPATIENT
Start: 2024-12-31

## 2025-03-10 ENCOUNTER — TELEPHONE (OUTPATIENT)
Age: 30
End: 2025-03-10

## 2025-03-10 DIAGNOSIS — E66.812 OBESITY, CLASS II, BMI 35-39.9: ICD-10-CM

## 2025-03-10 NOTE — TELEPHONE ENCOUNTER
Pt called in wondering if she is due to have her IUD removed/replaced. She has the Kyleena and it was placed 3/9/2022. States she was getting it replaced every 3 years in the past. Advised recommendations at this time are every 5 years. Pt would like MD confirmation.

## 2025-03-12 ENCOUNTER — TELEPHONE (OUTPATIENT)
Age: 30
End: 2025-03-12

## 2025-03-12 RX ORDER — PHENTERMINE HYDROCHLORIDE 37.5 MG/1
37.5 TABLET ORAL DAILY
Qty: 30 TABLET | Refills: 0 | OUTPATIENT
Start: 2025-03-12

## 2025-03-12 RX ORDER — PHENTERMINE HYDROCHLORIDE 37.5 MG/1
37.5 TABLET ORAL DAILY
Qty: 30 TABLET | Refills: 0 | Status: SHIPPED | OUTPATIENT
Start: 2025-03-12

## 2025-03-12 NOTE — TELEPHONE ENCOUNTER
PA for phentermine 37.5MG tablet SUBMITTED to [facility]    via    []CMM-KEY:   [x]Surescripts  []Availity-Auth ID # NDC #   []Faxed to plan   []Other website   []Phone call Case ID #     [x]PA sent as URGENT    All office notes, labs and other pertaining documents and studies sent. Clinical questions answered. Awaiting determination from insurance company.     Turnaround time for your insurance to make a decision on your Prior Authorization can take 7-21 business days.

## 2025-03-13 NOTE — TELEPHONE ENCOUNTER
PA for phentermine 37.5MG tablet EXCLUDED from plan       Reason:      Message sent to office clinical pool Yes

## 2025-03-19 NOTE — TELEPHONE ENCOUNTER
Attempted call to pt, sounded like someone answered but no response. Line disconnected. [employer] message sent.

## 2025-04-10 DIAGNOSIS — E66.812 OBESITY, CLASS II, BMI 35-39.9: ICD-10-CM

## 2025-04-10 RX ORDER — PHENTERMINE HYDROCHLORIDE 37.5 MG/1
37.5 TABLET ORAL DAILY
Qty: 30 TABLET | Refills: 0 | Status: SHIPPED | OUTPATIENT
Start: 2025-04-10

## 2025-05-12 ENCOUNTER — OFFICE VISIT (OUTPATIENT)
Dept: FAMILY MEDICINE CLINIC | Facility: CLINIC | Age: 30
End: 2025-05-12
Payer: COMMERCIAL

## 2025-05-12 VITALS
SYSTOLIC BLOOD PRESSURE: 124 MMHG | BODY MASS INDEX: 33.69 KG/M2 | OXYGEN SATURATION: 98 % | DIASTOLIC BLOOD PRESSURE: 76 MMHG | HEART RATE: 103 BPM | WEIGHT: 166.8 LBS

## 2025-05-12 DIAGNOSIS — K51.811 OTHER ULCERATIVE COLITIS WITH RECTAL BLEEDING (HCC): ICD-10-CM

## 2025-05-12 DIAGNOSIS — D68.00 VON WILLEBRAND DISEASE (HCC): ICD-10-CM

## 2025-05-12 DIAGNOSIS — R10.13 EPIGASTRIC PAIN: Primary | ICD-10-CM

## 2025-05-12 DIAGNOSIS — E66.812 OBESITY, CLASS II, BMI 35-39.9: ICD-10-CM

## 2025-05-12 PROCEDURE — 99214 OFFICE O/P EST MOD 30 MIN: CPT | Performed by: INTERNAL MEDICINE

## 2025-05-12 RX ORDER — PHENTERMINE HYDROCHLORIDE 37.5 MG/1
37.5 TABLET ORAL DAILY
Qty: 30 TABLET | Refills: 0 | Status: SHIPPED | OUTPATIENT
Start: 2025-05-12

## 2025-05-12 NOTE — PROGRESS NOTES
Name: Marni Wilcox      : 1995      MRN: [id number]  Encounter Provider: Nasim Nicolas MD  Encounter Date: 2025   Encounter department: [facility]    Assessment & Plan  Obesity, Class II, BMI 35-39.9  Patient is losing weight slowly, she lost 15 pounds being on phentermine, low-carb diet and being physically active.  Continue the same management.  Denies any side effects.    Orders:    phentermine (ADIPEX-P) 37.5 MG tablet; Take 1 tablet (37.5 mg total) by mouth in the morning    Von Willebrand disease (HCC)  Continue to monitor.       Other ulcerative colitis with rectal bleeding (HCC)  Follow-up with GI.       Epigastric pain  She suffers from chronic epigastric pain, she had EGD and colonoscopy done not too long ago, she also were on trial of omeprazole with no improvement of her symptoms.  She has history of ulcerative colitis.  I would like her to follow-up with gastroenterology again.  Orders:    Ambulatory Referral to Gastroenterology; Future         History of Present Illness     Patient came today for follow-up on her chronic medical problems.    Abdominal Pain  Associated symptoms include constipation. Pertinent negatives include no arthralgias, diarrhea, fever, frequency, headaches, nausea or vomiting.   Constipation  Associated symptoms include abdominal pain. Pertinent negatives include no back pain, diarrhea, difficulty urinating, fever, nausea or vomiting.     Review of Systems   Constitutional:  Negative for activity change, appetite change, chills, fatigue and fever.   HENT:  Negative for congestion, ear pain, rhinorrhea and sore throat.    Respiratory:  Negative for cough, shortness of breath and wheezing.    Cardiovascular:  Negative for chest pain, palpitations and leg swelling.   Gastrointestinal:  Positive for abdominal pain and constipation. Negative for abdominal distention, diarrhea, nausea and vomiting.   Genitourinary:  Negative for  difficulty urinating, frequency and pelvic pain.   Musculoskeletal:  Negative for arthralgias, back pain and neck pain.   Skin:  Negative for rash.   Neurological:  Negative for dizziness, tremors, weakness, numbness and headaches.     Past Medical History:   Diagnosis Date    Migraine     Placenta previa antepartum in first trimester     Varicella     As a child    Von Willebrand disease (HCC)      Past Surgical History:   Procedure Laterality Date    ADENOIDECTOMY      CHOLECYSTECTOMY      2017    EGD AND COLONOSCOPY      GALLBLADDER SURGERY      removal- resolved    MD  DELIVERY ONLY N/A 2018    Procedure:  SECTION ();  Surgeon: Lance Zhou MD;  Location: [facility];  Service: Obstetrics    TONSILLECTOMY      in      Family History   Problem Relation Age of Onset    No Known Problems Mother     Melanoma Father     No Known Problems Sister     No Known Problems Brother     Diabetes Paternal Grandmother     Hypertension Paternal Grandmother     Diabetes Paternal Grandfather      Social History     Tobacco Use    Smoking status: Never    Smokeless tobacco: Never   Vaping Use    Vaping status: Never Used   Substance and Sexual Activity    Alcohol use: No    Drug use: No    Sexual activity: Yes     Current Outpatient Medications on File Prior to Visit   Medication Sig    levonorgestrel (Kyleena) 19.5 MG intrauterine device 1 each by Intrauterine route once    [DISCONTINUED] phentermine (ADIPEX-P) 37.5 MG tablet TAKE 1 TABLET (37.5 MG TOTAL) BY MOUTH IN THE MORNING    omeprazole (PriLOSEC) 40 MG capsule Take 1 capsule (40 mg total) by mouth daily (Patient not taking: Reported on 2025)     Allergies   Allergen Reactions    Aspirin      Von-willebrand disorder      Immunization History   Administered Date(s) Administered    COVID-19 PFIZER VACCINE 0.3 ML IM 2021, 2021    INFLUENZA 10/08/2018, 10/11/2018, 2020, 2022, 10/25/2023    Influenza, seasonal,  injectable, preservative free 11/04/2024    Tdap 05/08/2018     Objective   /76   Pulse 103   Wt 75.7 kg (166 lb 12.8 oz)   LMP 04/07/2025 (Exact Date)   SpO2 98%   BMI 33.69 kg/m²     Physical Exam  Constitutional:       General: She is not in acute distress.     Appearance: She is obese. She is not toxic-appearing.   Cardiovascular:      Pulses: Normal pulses.   Pulmonary:      Effort: Pulmonary effort is normal.   Abdominal:      General: There is distension.

## 2025-05-12 NOTE — ASSESSMENT & PLAN NOTE
Patient is losing weight slowly, she lost 15 pounds being on phentermine, low-carb diet and being physically active.  Continue the same management.  Denies any side effects.    Orders:    phentermine (ADIPEX-P) 37.5 MG tablet; Take 1 tablet (37.5 mg total) by mouth in the morning

## 2025-05-12 NOTE — ASSESSMENT & PLAN NOTE
She suffers from chronic epigastric pain, she had EGD and colonoscopy done not too long ago, she also were on trial of omeprazole with no improvement of her symptoms.  She has history of ulcerative colitis.  I would like her to follow-up with gastroenterology again.  Orders:    Ambulatory Referral to Gastroenterology; Future

## 2025-07-09 DIAGNOSIS — E66.812 OBESITY, CLASS II, BMI 35-39.9: ICD-10-CM

## 2025-07-10 RX ORDER — PHENTERMINE HYDROCHLORIDE 37.5 MG/1
37.5 TABLET ORAL DAILY
Qty: 30 TABLET | Refills: 0 | Status: SHIPPED | OUTPATIENT
Start: 2025-07-10

## 2025-07-10 RX ORDER — PHENTERMINE HYDROCHLORIDE 37.5 MG/1
37.5 TABLET ORAL DAILY
Qty: 30 TABLET | Refills: 0 | OUTPATIENT
Start: 2025-07-10

## 2025-07-30 ENCOUNTER — OFFICE VISIT (OUTPATIENT)
Dept: GASTROENTEROLOGY | Facility: MEDICAL CENTER | Age: 30
End: 2025-07-30
Payer: COMMERCIAL

## 2025-07-30 ENCOUNTER — APPOINTMENT (OUTPATIENT)
Dept: LAB | Facility: MEDICAL CENTER | Age: 30
End: 2025-07-30
Payer: COMMERCIAL

## 2025-07-30 VITALS
BODY MASS INDEX: 32.74 KG/M2 | TEMPERATURE: 98.4 F | OXYGEN SATURATION: 99 % | SYSTOLIC BLOOD PRESSURE: 102 MMHG | HEIGHT: 59 IN | WEIGHT: 162.4 LBS | HEART RATE: 74 BPM | DIASTOLIC BLOOD PRESSURE: 67 MMHG

## 2025-07-30 DIAGNOSIS — K58.1 IRRITABLE BOWEL SYNDROME WITH CONSTIPATION: Primary | ICD-10-CM

## 2025-07-30 DIAGNOSIS — K51.811 OTHER ULCERATIVE COLITIS WITH RECTAL BLEEDING (HCC): ICD-10-CM

## 2025-07-30 DIAGNOSIS — K58.1 IRRITABLE BOWEL SYNDROME WITH CONSTIPATION: ICD-10-CM

## 2025-07-30 DIAGNOSIS — R14.0 BLOATING: ICD-10-CM

## 2025-07-30 PROCEDURE — 86140 C-REACTIVE PROTEIN: CPT

## 2025-07-30 PROCEDURE — 36415 COLL VENOUS BLD VENIPUNCTURE: CPT

## 2025-07-30 PROCEDURE — 99214 OFFICE O/P EST MOD 30 MIN: CPT | Performed by: STUDENT IN AN ORGANIZED HEALTH CARE EDUCATION/TRAINING PROGRAM

## 2025-07-30 RX ORDER — LINACLOTIDE 290 UG/1
290 CAPSULE, GELATIN COATED ORAL
Qty: 30 CAPSULE | Refills: 2 | Status: SHIPPED | OUTPATIENT
Start: 2025-07-30

## 2025-07-31 ENCOUNTER — TELEPHONE (OUTPATIENT)
Dept: GASTROENTEROLOGY | Facility: MEDICAL CENTER | Age: 30
End: 2025-07-31

## 2025-07-31 ENCOUNTER — APPOINTMENT (OUTPATIENT)
Dept: LAB | Facility: MEDICAL CENTER | Age: 30
End: 2025-07-31
Payer: COMMERCIAL

## 2025-07-31 DIAGNOSIS — K58.1 IRRITABLE BOWEL SYNDROME WITH CONSTIPATION: ICD-10-CM

## 2025-07-31 DIAGNOSIS — K51.811 OTHER ULCERATIVE COLITIS WITH RECTAL BLEEDING (HCC): ICD-10-CM

## 2025-07-31 LAB — CRP SERPL QL: 3.7 MG/L

## 2025-07-31 PROCEDURE — 83993 ASSAY FOR CALPROTECTIN FECAL: CPT

## 2025-08-01 LAB — CALPROTECTIN STL-MCNC: 69.3 ÂΜG/G

## (undated) DEVICE — SUT MONOCRYL 4-0 PS-2 27 IN Y426H

## (undated) DEVICE — SURGICEL 2 X 3

## (undated) DEVICE — HYDROPHILIC WOUND DRESSING WITH ZINC PLUS VITAMINS A AND B6.: Brand: DERMAGRAN®-B

## (undated) DEVICE — CHLORAPREP HI-LITE 26ML ORANGE

## (undated) DEVICE — GLOVE SRG BIOGEL ECLIPSE 8

## (undated) DEVICE — GLOVE INDICATOR PI UNDERGLOVE SZ 8 BLUE

## (undated) DEVICE — Device

## (undated) DEVICE — SWABSTCK, BENZOIN TINCTURE, 1/PK, STRL: Brand: APLICARE

## (undated) DEVICE — SKIN MARKER DUAL TIP WITH RULER CAP, FLEXIBLE RULER AND LABELS: Brand: DEVON

## (undated) DEVICE — PACK C-SECTION PBDS

## (undated) DEVICE — TELFA NON-ADHERENT ABSORBENT DRESSING: Brand: TELFA

## (undated) DEVICE — 3M™ STERI-STRIP™ REINFORCED ADHESIVE SKIN CLOSURES, R1547, 1/2 IN X 4 IN (12 MM X 100 MM), 6 STRIPS/ENVELOPE: Brand: 3M™ STERI-STRIP™

## (undated) DEVICE — STERISTRIP

## (undated) DEVICE — SUT VICRYL 0 CT-1 36 IN J946H

## (undated) DEVICE — ABDOMINAL PAD: Brand: DERMACEA

## (undated) DEVICE — GAUZE SPONGES,16 PLY: Brand: CURITY